# Patient Record
Sex: FEMALE | Race: WHITE | NOT HISPANIC OR LATINO | Employment: OTHER | URBAN - METROPOLITAN AREA
[De-identification: names, ages, dates, MRNs, and addresses within clinical notes are randomized per-mention and may not be internally consistent; named-entity substitution may affect disease eponyms.]

---

## 2017-11-10 ENCOUNTER — TRANSCRIBE ORDERS (OUTPATIENT)
Dept: ADMINISTRATIVE | Facility: HOSPITAL | Age: 52
End: 2017-11-10

## 2017-11-10 ENCOUNTER — LAB REQUISITION (OUTPATIENT)
Dept: LAB | Facility: HOSPITAL | Age: 52
End: 2017-11-10

## 2017-11-10 DIAGNOSIS — K59.00 CONSTIPATION, UNSPECIFIED CONSTIPATION TYPE: Primary | ICD-10-CM

## 2017-11-10 DIAGNOSIS — Z00.00 ROUTINE GENERAL MEDICAL EXAMINATION AT A HEALTH CARE FACILITY: ICD-10-CM

## 2017-11-10 LAB
BACTERIA UR QL AUTO: ABNORMAL /HPF
BILIRUB UR QL STRIP: NEGATIVE
CLARITY UR: CLEAR
COLOR UR: YELLOW
GLUCOSE UR STRIP-MCNC: NEGATIVE MG/DL
HGB UR QL STRIP.AUTO: NEGATIVE
HYALINE CASTS UR QL AUTO: ABNORMAL /LPF
KETONES UR QL STRIP: NEGATIVE
LEUKOCYTE ESTERASE UR QL STRIP.AUTO: NEGATIVE
NITRITE UR QL STRIP: NEGATIVE
PH UR STRIP.AUTO: 6 [PH] (ref 5–8)
PROT UR QL STRIP: NEGATIVE
RBC # UR: ABNORMAL /HPF
REF LAB TEST METHOD: ABNORMAL
SP GR UR STRIP: 1.01 (ref 1–1.03)
SQUAMOUS #/AREA URNS HPF: ABNORMAL /HPF
UROBILINOGEN UR QL STRIP: NORMAL
WBC UR QL AUTO: ABNORMAL /HPF

## 2017-11-10 PROCEDURE — 81001 URINALYSIS AUTO W/SCOPE: CPT | Performed by: UROLOGY

## 2017-11-10 PROCEDURE — 87086 URINE CULTURE/COLONY COUNT: CPT | Performed by: UROLOGY

## 2017-11-12 LAB — BACTERIA SPEC AEROBE CULT: NORMAL

## 2017-11-20 ENCOUNTER — APPOINTMENT (OUTPATIENT)
Dept: GENERAL RADIOLOGY | Facility: HOSPITAL | Age: 52
End: 2017-11-20
Attending: COLON & RECTAL SURGERY

## 2017-11-29 ENCOUNTER — APPOINTMENT (OUTPATIENT)
Dept: GENERAL RADIOLOGY | Facility: HOSPITAL | Age: 52
End: 2017-11-29
Attending: COLON & RECTAL SURGERY

## 2017-12-01 ENCOUNTER — HOSPITAL ENCOUNTER (OUTPATIENT)
Dept: GENERAL RADIOLOGY | Facility: HOSPITAL | Age: 52
Discharge: HOME | End: 2017-12-01
Attending: COLON & RECTAL SURGERY | Admitting: COLON & RECTAL SURGERY

## 2017-12-01 DIAGNOSIS — K59.00 CONSTIPATION, UNSPECIFIED CONSTIPATION TYPE: ICD-10-CM

## 2017-12-01 PROCEDURE — 74270 X-RAY XM COLON 1CNTRST STD: CPT

## 2017-12-01 PROCEDURE — 0 DIATRIZOATE MEGLUMINE & SODIUM PER 1 ML: Performed by: COLON & RECTAL SURGERY

## 2017-12-01 RX ADMIN — DIATRIZOATE MEGLUMINE AND DIATRIZOATE SODIUM 480 ML: 660; 100 LIQUID ORAL; RECTAL at 10:20

## 2018-03-15 ENCOUNTER — LAB REQUISITION (OUTPATIENT)
Dept: LAB | Facility: HOSPITAL | Age: 53
End: 2018-03-15

## 2018-03-15 DIAGNOSIS — Z00.00 ROUTINE GENERAL MEDICAL EXAMINATION AT A HEALTH CARE FACILITY: ICD-10-CM

## 2018-03-15 LAB
BILIRUB UR QL STRIP: NEGATIVE
CLARITY UR: CLEAR
COLOR UR: YELLOW
GLUCOSE UR STRIP-MCNC: NEGATIVE MG/DL
HGB UR QL STRIP.AUTO: NEGATIVE
KETONES UR QL STRIP: NEGATIVE
LEUKOCYTE ESTERASE UR QL STRIP.AUTO: ABNORMAL
NITRITE UR QL STRIP: NEGATIVE
PH UR STRIP.AUTO: 6 [PH] (ref 5–8)
PROT UR QL STRIP: NEGATIVE
SP GR UR STRIP: 1.01 (ref 1–1.03)
UROBILINOGEN UR QL STRIP: ABNORMAL

## 2018-03-15 PROCEDURE — 87147 CULTURE TYPE IMMUNOLOGIC: CPT | Performed by: UROLOGY

## 2018-03-15 PROCEDURE — 87086 URINE CULTURE/COLONY COUNT: CPT | Performed by: UROLOGY

## 2018-03-15 PROCEDURE — 36415 COLL VENOUS BLD VENIPUNCTURE: CPT | Performed by: UROLOGY

## 2018-03-15 PROCEDURE — 81003 URINALYSIS AUTO W/O SCOPE: CPT | Performed by: UROLOGY

## 2018-03-17 LAB
BACTERIA SPEC AEROBE CULT: ABNORMAL
BACTERIA SPEC AEROBE CULT: ABNORMAL
STREP GROUPING: ABNORMAL

## 2018-04-20 ENCOUNTER — LAB REQUISITION (OUTPATIENT)
Dept: LAB | Facility: HOSPITAL | Age: 53
End: 2018-04-20

## 2018-04-20 DIAGNOSIS — Z00.00 ROUTINE GENERAL MEDICAL EXAMINATION AT A HEALTH CARE FACILITY: ICD-10-CM

## 2018-04-20 LAB
BACTERIA UR QL AUTO: ABNORMAL /HPF
BILIRUB UR QL STRIP: NEGATIVE
CLARITY UR: CLEAR
COLOR UR: YELLOW
GLUCOSE UR STRIP-MCNC: NEGATIVE MG/DL
HGB UR QL STRIP.AUTO: ABNORMAL
HYALINE CASTS UR QL AUTO: ABNORMAL /LPF
KETONES UR QL STRIP: NEGATIVE
LEUKOCYTE ESTERASE UR QL STRIP.AUTO: ABNORMAL
NITRITE UR QL STRIP: NEGATIVE
PH UR STRIP.AUTO: 6.5 [PH] (ref 5–8)
PROT UR QL STRIP: NEGATIVE
RBC # UR: ABNORMAL /HPF
REF LAB TEST METHOD: ABNORMAL
SP GR UR STRIP: <=1.005 (ref 1–1.03)
SQUAMOUS #/AREA URNS HPF: ABNORMAL /HPF
UROBILINOGEN UR QL STRIP: ABNORMAL
WBC UR QL AUTO: ABNORMAL /HPF

## 2018-04-20 PROCEDURE — 36415 COLL VENOUS BLD VENIPUNCTURE: CPT | Performed by: OBSTETRICS & GYNECOLOGY

## 2018-04-20 PROCEDURE — 87147 CULTURE TYPE IMMUNOLOGIC: CPT | Performed by: OBSTETRICS & GYNECOLOGY

## 2018-04-20 PROCEDURE — 81001 URINALYSIS AUTO W/SCOPE: CPT | Performed by: OBSTETRICS & GYNECOLOGY

## 2018-04-20 PROCEDURE — 87086 URINE CULTURE/COLONY COUNT: CPT | Performed by: OBSTETRICS & GYNECOLOGY

## 2018-04-22 LAB
BACTERIA SPEC AEROBE CULT: ABNORMAL
BACTERIA SPEC AEROBE CULT: ABNORMAL
STREP GROUPING: ABNORMAL

## 2019-08-23 ENCOUNTER — HOSPITAL ENCOUNTER (OUTPATIENT)
Dept: GENERAL RADIOLOGY | Facility: HOSPITAL | Age: 54
Discharge: HOME | End: 2019-08-23
Admitting: NURSE PRACTITIONER

## 2019-08-23 ENCOUNTER — TRANSCRIBE ORDERS (OUTPATIENT)
Dept: ADMINISTRATIVE | Facility: HOSPITAL | Age: 54
End: 2019-08-23

## 2019-08-23 DIAGNOSIS — M25.562 LEFT MEDIAL KNEE PAIN: Primary | ICD-10-CM

## 2019-08-23 PROCEDURE — 73560 X-RAY EXAM OF KNEE 1 OR 2: CPT

## 2019-08-26 ENCOUNTER — TRANSCRIBE ORDERS (OUTPATIENT)
Dept: ADMINISTRATIVE | Facility: HOSPITAL | Age: 54
End: 2019-08-26

## 2019-08-26 DIAGNOSIS — E04.1 THYROID NODULE: Primary | ICD-10-CM

## 2019-09-10 ENCOUNTER — HOSPITAL ENCOUNTER (EMERGENCY)
Facility: HOSPITAL | Age: 54
Discharge: HOME | End: 2019-09-11
Attending: EMERGENCY MEDICINE | Admitting: EMERGENCY MEDICINE

## 2019-09-10 ENCOUNTER — APPOINTMENT (OUTPATIENT)
Dept: CT IMAGING | Facility: HOSPITAL | Age: 54
End: 2019-09-10

## 2019-09-10 VITALS
WEIGHT: 138 LBS | HEART RATE: 68 BPM | SYSTOLIC BLOOD PRESSURE: 130 MMHG | DIASTOLIC BLOOD PRESSURE: 64 MMHG | RESPIRATION RATE: 16 BRPM | BODY MASS INDEX: 21.66 KG/M2 | TEMPERATURE: 98.5 F | HEIGHT: 67 IN | OXYGEN SATURATION: 98 %

## 2019-09-10 DIAGNOSIS — M79.18 MUSCULOSKELETAL PAIN: ICD-10-CM

## 2019-09-10 DIAGNOSIS — R10.9 RIGHT FLANK PAIN: Primary | ICD-10-CM

## 2019-09-10 LAB
ALBUMIN SERPL-MCNC: 4.9 G/DL (ref 3.5–5.2)
ALBUMIN/GLOB SERPL: 2 G/DL
ALP SERPL-CCNC: 55 U/L (ref 39–117)
ALT SERPL W P-5'-P-CCNC: 8 U/L (ref 1–33)
ANION GAP SERPL CALCULATED.3IONS-SCNC: 10 MMOL/L (ref 5–15)
AST SERPL-CCNC: 15 U/L (ref 1–32)
BACTERIA UR QL AUTO: NORMAL /HPF
BASOPHILS # BLD AUTO: 0.02 10*3/MM3 (ref 0–0.2)
BASOPHILS NFR BLD AUTO: 0.4 % (ref 0–1.5)
BILIRUB SERPL-MCNC: 0.3 MG/DL (ref 0.2–1.2)
BILIRUB UR QL STRIP: NEGATIVE
BUN BLD-MCNC: 9 MG/DL (ref 6–20)
BUN/CREAT SERPL: 8.8 (ref 7–25)
CALCIUM SPEC-SCNC: 9.6 MG/DL (ref 8.6–10.5)
CHLORIDE SERPL-SCNC: 104 MMOL/L (ref 98–107)
CLARITY UR: CLEAR
CO2 SERPL-SCNC: 27 MMOL/L (ref 22–29)
COLOR UR: YELLOW
CREAT BLD-MCNC: 1.02 MG/DL (ref 0.57–1)
DEPRECATED RDW RBC AUTO: 45.4 FL (ref 37–54)
EOSINOPHIL # BLD AUTO: 0.05 10*3/MM3 (ref 0–0.4)
EOSINOPHIL NFR BLD AUTO: 1 % (ref 0.3–6.2)
ERYTHROCYTE [DISTWIDTH] IN BLOOD BY AUTOMATED COUNT: 13 % (ref 12.3–15.4)
GFR SERPL CREATININE-BSD FRML MDRD: 57 ML/MIN/1.73
GLOBULIN UR ELPH-MCNC: 2.5 GM/DL
GLUCOSE BLD-MCNC: 94 MG/DL (ref 65–99)
GLUCOSE UR STRIP-MCNC: NEGATIVE MG/DL
HCT VFR BLD AUTO: 41 % (ref 34–46.6)
HGB BLD-MCNC: 13.4 G/DL (ref 12–15.9)
HGB UR QL STRIP.AUTO: NEGATIVE
HOLD SPECIMEN: NORMAL
HOLD SPECIMEN: NORMAL
HYALINE CASTS UR QL AUTO: NORMAL /LPF
IMM GRANULOCYTES # BLD AUTO: 0.01 10*3/MM3 (ref 0–0.05)
IMM GRANULOCYTES NFR BLD AUTO: 0.2 % (ref 0–0.5)
KETONES UR QL STRIP: NEGATIVE
LEUKOCYTE ESTERASE UR QL STRIP.AUTO: ABNORMAL
LIPASE SERPL-CCNC: 40 U/L (ref 13–60)
LYMPHOCYTES # BLD AUTO: 1.96 10*3/MM3 (ref 0.7–3.1)
LYMPHOCYTES NFR BLD AUTO: 38.1 % (ref 19.6–45.3)
MCH RBC QN AUTO: 31.1 PG (ref 26.6–33)
MCHC RBC AUTO-ENTMCNC: 32.7 G/DL (ref 31.5–35.7)
MCV RBC AUTO: 95.1 FL (ref 79–97)
MONOCYTES # BLD AUTO: 0.33 10*3/MM3 (ref 0.1–0.9)
MONOCYTES NFR BLD AUTO: 6.4 % (ref 5–12)
NEUTROPHILS # BLD AUTO: 2.78 10*3/MM3 (ref 1.7–7)
NEUTROPHILS NFR BLD AUTO: 53.9 % (ref 42.7–76)
NITRITE UR QL STRIP: NEGATIVE
NRBC BLD AUTO-RTO: 0 /100 WBC (ref 0–0.2)
PH UR STRIP.AUTO: 5.5 [PH] (ref 5–8)
PLATELET # BLD AUTO: 212 10*3/MM3 (ref 140–450)
PMV BLD AUTO: 9.5 FL (ref 6–12)
POTASSIUM BLD-SCNC: 3.9 MMOL/L (ref 3.5–5.2)
PROT SERPL-MCNC: 7.4 G/DL (ref 6–8.5)
PROT UR QL STRIP: NEGATIVE
RBC # BLD AUTO: 4.31 10*6/MM3 (ref 3.77–5.28)
RBC # UR: NORMAL /HPF
REF LAB TEST METHOD: NORMAL
SODIUM BLD-SCNC: 141 MMOL/L (ref 136–145)
SP GR UR STRIP: 1.01 (ref 1–1.03)
SQUAMOUS #/AREA URNS HPF: NORMAL /HPF
UROBILINOGEN UR QL STRIP: ABNORMAL
WBC NRBC COR # BLD: 5.15 10*3/MM3 (ref 3.4–10.8)
WBC UR QL AUTO: NORMAL /HPF
WHOLE BLOOD HOLD SPECIMEN: NORMAL
WHOLE BLOOD HOLD SPECIMEN: NORMAL

## 2019-09-10 PROCEDURE — 87086 URINE CULTURE/COLONY COUNT: CPT | Performed by: EMERGENCY MEDICINE

## 2019-09-10 PROCEDURE — 83690 ASSAY OF LIPASE: CPT | Performed by: EMERGENCY MEDICINE

## 2019-09-10 PROCEDURE — 74176 CT ABD & PELVIS W/O CONTRAST: CPT

## 2019-09-10 PROCEDURE — 99284 EMERGENCY DEPT VISIT MOD MDM: CPT

## 2019-09-10 PROCEDURE — 85025 COMPLETE CBC W/AUTO DIFF WBC: CPT

## 2019-09-10 PROCEDURE — 80053 COMPREHEN METABOLIC PANEL: CPT | Performed by: EMERGENCY MEDICINE

## 2019-09-10 PROCEDURE — 81001 URINALYSIS AUTO W/SCOPE: CPT | Performed by: EMERGENCY MEDICINE

## 2019-09-10 RX ORDER — SODIUM CHLORIDE 0.9 % (FLUSH) 0.9 %
10 SYRINGE (ML) INJECTION AS NEEDED
Status: DISCONTINUED | OUTPATIENT
Start: 2019-09-10 | End: 2019-09-11 | Stop reason: HOSPADM

## 2019-09-10 RX ORDER — LEVOFLOXACIN 500 MG/1
500 TABLET, FILM COATED ORAL DAILY
COMMUNITY
End: 2022-08-04 | Stop reason: ALTCHOICE

## 2019-09-11 RX ORDER — CYCLOBENZAPRINE HCL 10 MG
10 TABLET ORAL 3 TIMES DAILY
Qty: 20 TABLET | Refills: 0 | Status: SHIPPED | OUTPATIENT
Start: 2019-09-11 | End: 2022-08-04 | Stop reason: ALTCHOICE

## 2019-09-11 NOTE — DISCHARGE INSTRUCTIONS
Complete your course of Levaquin.    Take muscle relaxers as directed.    Follow up with your primary care provider in 1 week to monitor your recovery.    Return if you experience any worsening symptoms or concerns.

## 2019-09-11 NOTE — ED PROVIDER NOTES
Subjective   Eleanor Robert is a 54 y.o. female presenting to the emergency department complaining of dysuria and right flank pain that onset approximately 6 days ago. Her symptoms prompted presentation to her primary care provider 4 days ago, wherein she was diagnosed with a kidney infection and subsequently placed on Levaquin. She notes that she had a physical on 8/23/19 that revealed positive nitrates in her urine. She was placed on Macrobid for 7 days. At that point, she noticed only mild dysuria. She had not experienced any more trouble prior to 6 days ago. She notes some associated nausea and headache, but denies any fever. She believes that her symptoms may also be a result of her Levaquin course. She notes that her flank pain radiates to her right lower back. Her pain is not exacerbated by deep breathing, but it is reproduced when she sits upright from a lying position. She denies any recent heavy lifting. She also reports that Tylenol has offered no relief of her pain. Of note, she reports a significant history of UTIs as a result of klebsiella pneumoniae. 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 She also has had her gallbladder and appendix removed. She also notes a history of rheumatoid arthritis and degenerative disc disease. There are no other acute complaints at this time.        History provided by:  Patient  Flank Pain   Pain location:  R flank  Pain radiates to:  Back  Pain severity:  Moderate  Onset quality:  Sudden  Duration:  6 days  Timing:  Constant  Progression:  Unchanged  Chronicity:  New  Context comment:  Placed on Levaquin 4 days ago for a suspected kidney infection, has not yet experienced any relief  Relieved by:  Nothing  Worsened by:  Position changes  Ineffective treatments:  Acetaminophen  Associated symptoms: dysuria and nausea    Associated symptoms: no fever        Review of Systems   Constitutional: Negative for fever.    Gastrointestinal: Positive for nausea.   Genitourinary: Positive for dysuria and flank pain.   Neurological: Negative for headaches.   All other systems reviewed and are negative.      Past Medical History:   Diagnosis Date   • History of nephrectomy        Allergies   Allergen Reactions   • Dilaudid [Hydromorphone Hcl] Anxiety   • Morphine Itching and Anxiety   • Sulfa Antibiotics Rash       Past Surgical History:   Procedure Laterality Date   • NEPHRECTOMY Left        History reviewed. No pertinent family history.    Social History     Socioeconomic History   • Marital status:      Spouse name: Not on file   • Number of children: Not on file   • Years of education: Not on file   • Highest education level: Not on file   Tobacco Use   • Smoking status: Never Smoker   • Smokeless tobacco: Never Used   Substance and Sexual Activity   • Alcohol use: No     Frequency: Never         Objective   Physical Exam   Constitutional: She is oriented to person, place, and time. She appears well-developed and well-nourished. No distress.   HENT:   Head: Normocephalic and atraumatic.   Eyes: Conjunctivae are normal. No scleral icterus.   Neck: Normal range of motion. Neck supple.   Cardiovascular: Normal rate, regular rhythm and normal heart sounds.   No murmur heard.  Pulmonary/Chest: Effort normal and breath sounds normal. No respiratory distress.   Abdominal: Soft. There is tenderness.   Mild right upper and lower quadrant tenderness. No CVA tenderness.   Musculoskeletal: Normal range of motion. She exhibits no edema.   Neurological: She is alert and oriented to person, place, and time.   Skin: Skin is warm and dry.   Psychiatric: She has a normal mood and affect. Her behavior is normal.   Nursing note and vitals reviewed.      Procedures         ED Course     Recent Results (from the past 24 hour(s))   Comprehensive Metabolic Panel    Collection Time: 09/10/19  8:49 PM   Result Value Ref Range    Glucose 94 65 - 99  mg/dL    BUN 9 6 - 20 mg/dL    Creatinine 1.02 (H) 0.57 - 1.00 mg/dL    Sodium 141 136 - 145 mmol/L    Potassium 3.9 3.5 - 5.2 mmol/L    Chloride 104 98 - 107 mmol/L    CO2 27.0 22.0 - 29.0 mmol/L    Calcium 9.6 8.6 - 10.5 mg/dL    Total Protein 7.4 6.0 - 8.5 g/dL    Albumin 4.90 3.50 - 5.20 g/dL    ALT (SGPT) 8 1 - 33 U/L    AST (SGOT) 15 1 - 32 U/L    Alkaline Phosphatase 55 39 - 117 U/L    Total Bilirubin 0.3 0.2 - 1.2 mg/dL    eGFR Non African Amer 57 (L) >60 mL/min/1.73    Globulin 2.5 gm/dL    A/G Ratio 2.0 g/dL    BUN/Creatinine Ratio 8.8 7.0 - 25.0    Anion Gap 10.0 5.0 - 15.0 mmol/L   Lipase    Collection Time: 09/10/19  8:49 PM   Result Value Ref Range    Lipase 40 13 - 60 U/L   Light Blue Top    Collection Time: 09/10/19  8:49 PM   Result Value Ref Range    Extra Tube hold for add-on    Green Top (Gel)    Collection Time: 09/10/19  8:49 PM   Result Value Ref Range    Extra Tube Hold for add-ons.    Lavender Top    Collection Time: 09/10/19  8:49 PM   Result Value Ref Range    Extra Tube hold for add-on    Gold Top - SST    Collection Time: 09/10/19  8:49 PM   Result Value Ref Range    Extra Tube Hold for add-ons.    CBC Auto Differential    Collection Time: 09/10/19  8:49 PM   Result Value Ref Range    WBC 5.15 3.40 - 10.80 10*3/mm3    RBC 4.31 3.77 - 5.28 10*6/mm3    Hemoglobin 13.4 12.0 - 15.9 g/dL    Hematocrit 41.0 34.0 - 46.6 %    MCV 95.1 79.0 - 97.0 fL    MCH 31.1 26.6 - 33.0 pg    MCHC 32.7 31.5 - 35.7 g/dL    RDW 13.0 12.3 - 15.4 %    RDW-SD 45.4 37.0 - 54.0 fl    MPV 9.5 6.0 - 12.0 fL    Platelets 212 140 - 450 10*3/mm3    Neutrophil % 53.9 42.7 - 76.0 %    Lymphocyte % 38.1 19.6 - 45.3 %    Monocyte % 6.4 5.0 - 12.0 %    Eosinophil % 1.0 0.3 - 6.2 %    Basophil % 0.4 0.0 - 1.5 %    Immature Grans % 0.2 0.0 - 0.5 %    Neutrophils, Absolute 2.78 1.70 - 7.00 10*3/mm3    Lymphocytes, Absolute 1.96 0.70 - 3.10 10*3/mm3    Monocytes, Absolute 0.33 0.10 - 0.90 10*3/mm3    Eosinophils, Absolute 0.05  0.00 - 0.40 10*3/mm3    Basophils, Absolute 0.02 0.00 - 0.20 10*3/mm3    Immature Grans, Absolute 0.01 0.00 - 0.05 10*3/mm3    nRBC 0.0 0.0 - 0.2 /100 WBC   Urinalysis With Microscopic If Indicated (No Culture) - Urine, Clean Catch    Collection Time: 09/10/19  9:06 PM   Result Value Ref Range    Color, UA Yellow Yellow, Straw    Appearance, UA Clear Clear    pH, UA 5.5 5.0 - 8.0    Specific Gravity, UA 1.006 1.001 - 1.030    Glucose, UA Negative Negative    Ketones, UA Negative Negative    Bilirubin, UA Negative Negative    Blood, UA Negative Negative    Protein, UA Negative Negative    Leuk Esterase, UA Small (1+) (A) Negative    Nitrite, UA Negative Negative    Urobilinogen, UA 0.2 E.U./dL 0.2 - 1.0 E.U./dL   Urinalysis, Microscopic Only - Urine, Clean Catch    Collection Time: 09/10/19  9:06 PM   Result Value Ref Range    RBC, UA 0-2 None Seen, 0-2 /HPF    WBC, UA None Seen None Seen, 0-2 /HPF    Bacteria, UA None Seen None Seen, Trace /HPF    Squamous Epithelial Cells, UA 0-2 None Seen, 0-2 /HPF    Hyaline Casts, UA None Seen 0 - 6 /LPF    Methodology Automated Microscopy      Note: In addition to lab results from this visit, the labs listed above may include labs taken at another facility or during a different encounter within the last 24 hours. Please correlate lab times with ED admission and discharge times for further clarification of the services performed during this visit.    CT Abdomen Pelvis Without Contrast   Final Result   Status post left nephrectomy. There is no right foraminal lithiasis or ureterolithiasis, though there is mild right renal pyelocaliectasis. The right ureter is normal in caliber.      Otherwise normal. No acute abnormality.         Signer Name: Moise Hoffman MD    Signed: 9/10/2019 11:12 PM    Workstation Name: [id number]     [facility]        Vitals:    09/10/19 1953 09/10/19 2304   BP: 174/87 130/64   BP Location: Left arm    Patient Position:  "Sitting    Pulse: 63 68   Resp: 15 16   Temp: 98.5 °F (36.9 °C)    TempSrc: Oral    SpO2: 100% 98%   Weight: 62.6 kg (138 lb)    Height: 170.2 cm (67\")      Medications   sodium chloride 0.9 % flush 10 mL (not administered)     ECG/EMG Results (last 24 hours)     ** No results found for the last 24 hours. **        No orders to display                       MDM    Final diagnoses:   Right flank pain   Musculoskeletal pain       Documentation assistance provided by vinnie Lozano.  Information recorded by the scribe was done at my direction and has been verified and validated by me.     Dyan Lozano  09/11/19 0012       Dyan Lozano  09/11/19 0019       Janet Liang APRN  09/11/19 0021    "

## 2019-09-12 LAB — BACTERIA SPEC AEROBE CULT: NO GROWTH

## 2019-09-16 ENCOUNTER — APPOINTMENT (OUTPATIENT)
Dept: ULTRASOUND IMAGING | Facility: HOSPITAL | Age: 54
End: 2019-09-16

## 2019-10-10 ENCOUNTER — TRANSCRIBE ORDERS (OUTPATIENT)
Dept: ADMINISTRATIVE | Facility: HOSPITAL | Age: 54
End: 2019-10-10

## 2019-10-10 ENCOUNTER — HOSPITAL ENCOUNTER (OUTPATIENT)
Dept: GENERAL RADIOLOGY | Facility: HOSPITAL | Age: 54
Discharge: HOME | End: 2019-10-10
Admitting: FAMILY MEDICINE

## 2019-10-10 DIAGNOSIS — M50.90 DISC DISORDER OF CERVICAL REGION: ICD-10-CM

## 2019-10-10 DIAGNOSIS — M51.36 DEGENERATION OF LUMBAR INTERVERTEBRAL DISC: ICD-10-CM

## 2019-10-10 DIAGNOSIS — M51.36 DEGENERATION OF LUMBAR INTERVERTEBRAL DISC: Primary | ICD-10-CM

## 2019-10-10 PROCEDURE — 72110 X-RAY EXAM L-2 SPINE 4/>VWS: CPT

## 2019-10-10 PROCEDURE — 72050 X-RAY EXAM NECK SPINE 4/5VWS: CPT

## 2019-11-05 ENCOUNTER — TRANSCRIBE ORDERS (OUTPATIENT)
Dept: ADMINISTRATIVE | Facility: HOSPITAL | Age: 54
End: 2019-11-05

## 2019-11-05 DIAGNOSIS — E04.1 THYROID NODULE: Primary | ICD-10-CM

## 2019-11-13 ENCOUNTER — HOSPITAL ENCOUNTER (OUTPATIENT)
Dept: ULTRASOUND IMAGING | Facility: HOSPITAL | Age: 54
Discharge: HOME | End: 2019-11-13
Admitting: FAMILY MEDICINE

## 2019-11-13 DIAGNOSIS — E04.1 THYROID NODULE: ICD-10-CM

## 2019-11-13 PROCEDURE — 76536 US EXAM OF HEAD AND NECK: CPT

## 2021-05-06 ENCOUNTER — TRANSCRIBE ORDERS (OUTPATIENT)
Dept: ADMINISTRATIVE | Facility: HOSPITAL | Age: 56
End: 2021-05-06

## 2021-05-06 DIAGNOSIS — E04.1 THYROID NODULE: Primary | ICD-10-CM

## 2021-05-28 ENCOUNTER — APPOINTMENT (OUTPATIENT)
Dept: ULTRASOUND IMAGING | Facility: HOSPITAL | Age: 56
End: 2021-05-28

## 2021-06-24 ENCOUNTER — HOSPITAL ENCOUNTER (OUTPATIENT)
Dept: ULTRASOUND IMAGING | Facility: HOSPITAL | Age: 56
Discharge: HOME | End: 2021-06-24
Admitting: FAMILY MEDICINE

## 2021-06-24 DIAGNOSIS — E04.1 THYROID NODULE: ICD-10-CM

## 2021-06-24 PROCEDURE — 76536 US EXAM OF HEAD AND NECK: CPT

## 2022-02-23 ENCOUNTER — TRANSCRIBE ORDERS (OUTPATIENT)
Dept: LAB | Facility: HOSPITAL | Age: 57
End: 2022-02-23

## 2022-02-23 ENCOUNTER — HOSPITAL ENCOUNTER (OUTPATIENT)
Dept: GENERAL RADIOLOGY | Facility: HOSPITAL | Age: 57
Discharge: HOME | End: 2022-02-23

## 2022-02-23 ENCOUNTER — LAB (OUTPATIENT)
Dept: LAB | Facility: HOSPITAL | Age: 57
End: 2022-02-23

## 2022-02-23 DIAGNOSIS — R31.1 BENIGN ESSENTIAL MICROSCOPIC HEMATURIA: ICD-10-CM

## 2022-02-23 DIAGNOSIS — R31.1 BENIGN ESSENTIAL MICROSCOPIC HEMATURIA: Primary | ICD-10-CM

## 2022-02-23 LAB
BACTERIA UR QL AUTO: ABNORMAL /HPF
BILIRUB UR QL STRIP: NEGATIVE
CLARITY UR: CLEAR
COLOR UR: YELLOW
GLUCOSE UR STRIP-MCNC: NEGATIVE MG/DL
HGB UR QL STRIP.AUTO: NEGATIVE
HYALINE CASTS UR QL AUTO: ABNORMAL /LPF
KETONES UR QL STRIP: NEGATIVE
LEUKOCYTE ESTERASE UR QL STRIP.AUTO: ABNORMAL
NITRITE UR QL STRIP: NEGATIVE
PH UR STRIP.AUTO: 6.5 [PH] (ref 5–8)
PROT UR QL STRIP: NEGATIVE
RBC # UR STRIP: ABNORMAL /HPF
REF LAB TEST METHOD: ABNORMAL
SP GR UR STRIP: 1.01 (ref 1–1.03)
SQUAMOUS #/AREA URNS HPF: ABNORMAL /HPF
UROBILINOGEN UR QL STRIP: ABNORMAL
WBC # UR STRIP: ABNORMAL /HPF

## 2022-02-23 PROCEDURE — 87088 URINE BACTERIA CULTURE: CPT

## 2022-02-23 PROCEDURE — 87086 URINE CULTURE/COLONY COUNT: CPT

## 2022-02-23 PROCEDURE — 87186 SC STD MICRODIL/AGAR DIL: CPT

## 2022-02-23 PROCEDURE — 81001 URINALYSIS AUTO W/SCOPE: CPT

## 2022-02-23 PROCEDURE — 74018 RADEX ABDOMEN 1 VIEW: CPT

## 2022-02-25 LAB — BACTERIA SPEC AEROBE CULT: ABNORMAL

## 2022-07-18 ENCOUNTER — TRANSCRIBE ORDERS (OUTPATIENT)
Dept: ADMINISTRATIVE | Facility: HOSPITAL | Age: 57
End: 2022-07-18

## 2022-07-18 DIAGNOSIS — E04.1 THYROID NODULE: Primary | ICD-10-CM

## 2022-08-04 ENCOUNTER — LAB (OUTPATIENT)
Dept: LAB | Facility: HOSPITAL | Age: 57
End: 2022-08-04

## 2022-08-04 ENCOUNTER — TELEPHONE (OUTPATIENT)
Dept: PSYCHIATRY | Facility: CLINIC | Age: 57
End: 2022-08-04

## 2022-08-04 ENCOUNTER — OFFICE VISIT (OUTPATIENT)
Dept: PSYCHIATRY | Facility: CLINIC | Age: 57
End: 2022-08-04

## 2022-08-04 VITALS
DIASTOLIC BLOOD PRESSURE: 72 MMHG | BODY MASS INDEX: 24.59 KG/M2 | HEIGHT: 66 IN | WEIGHT: 153 LBS | SYSTOLIC BLOOD PRESSURE: 122 MMHG | HEART RATE: 74 BPM

## 2022-08-04 DIAGNOSIS — F43.10 POST TRAUMATIC STRESS DISORDER (PTSD): ICD-10-CM

## 2022-08-04 DIAGNOSIS — F51.05 INSOMNIA DUE TO MENTAL DISORDER: ICD-10-CM

## 2022-08-04 DIAGNOSIS — F41.1 GENERALIZED ANXIETY DISORDER: ICD-10-CM

## 2022-08-04 DIAGNOSIS — F31.9 BIPOLAR DEPRESSION: ICD-10-CM

## 2022-08-04 DIAGNOSIS — Z79.899 MEDICATION MANAGEMENT: ICD-10-CM

## 2022-08-04 DIAGNOSIS — F31.9 BIPOLAR DEPRESSION: Primary | ICD-10-CM

## 2022-08-04 LAB
ALBUMIN SERPL-MCNC: 4.3 G/DL (ref 3.5–5.2)
ALBUMIN/GLOB SERPL: 2 G/DL
ALP SERPL-CCNC: 48 U/L (ref 39–117)
ALT SERPL W P-5'-P-CCNC: 15 U/L (ref 1–33)
ANION GAP SERPL CALCULATED.3IONS-SCNC: 10.1 MMOL/L (ref 5–15)
AST SERPL-CCNC: 20 U/L (ref 1–32)
BASOPHILS # BLD AUTO: 0.04 10*3/MM3 (ref 0–0.2)
BASOPHILS NFR BLD AUTO: 0.9 % (ref 0–1.5)
BILIRUB SERPL-MCNC: 0.2 MG/DL (ref 0–1.2)
BUN SERPL-MCNC: 16 MG/DL (ref 6–20)
BUN/CREAT SERPL: 16.3 (ref 7–25)
CALCIUM SPEC-SCNC: 9.1 MG/DL (ref 8.6–10.5)
CHLORIDE SERPL-SCNC: 107 MMOL/L (ref 98–107)
CHOLEST SERPL-MCNC: 164 MG/DL (ref 0–200)
CO2 SERPL-SCNC: 23.9 MMOL/L (ref 22–29)
CREAT SERPL-MCNC: 0.98 MG/DL (ref 0.57–1)
DEPRECATED RDW RBC AUTO: 42.1 FL (ref 37–54)
EGFRCR SERPLBLD CKD-EPI 2021: 67.9 ML/MIN/1.73
EOSINOPHIL # BLD AUTO: 0.12 10*3/MM3 (ref 0–0.4)
EOSINOPHIL NFR BLD AUTO: 2.8 % (ref 0.3–6.2)
ERYTHROCYTE [DISTWIDTH] IN BLOOD BY AUTOMATED COUNT: 12.4 % (ref 12.3–15.4)
GLOBULIN UR ELPH-MCNC: 2.2 GM/DL
GLUCOSE SERPL-MCNC: 93 MG/DL (ref 65–99)
HBA1C MFR BLD: 5.1 % (ref 4.8–5.6)
HCT VFR BLD AUTO: 40.5 % (ref 34–46.6)
HDLC SERPL-MCNC: 69 MG/DL (ref 40–60)
HGB BLD-MCNC: 13.9 G/DL (ref 12–15.9)
IMM GRANULOCYTES # BLD AUTO: 0.01 10*3/MM3 (ref 0–0.05)
IMM GRANULOCYTES NFR BLD AUTO: 0.2 % (ref 0–0.5)
LDLC SERPL CALC-MCNC: 86 MG/DL (ref 0–100)
LDLC/HDLC SERPL: 1.26 {RATIO}
LYMPHOCYTES # BLD AUTO: 1.42 10*3/MM3 (ref 0.7–3.1)
LYMPHOCYTES NFR BLD AUTO: 32.6 % (ref 19.6–45.3)
MCH RBC QN AUTO: 32.4 PG (ref 26.6–33)
MCHC RBC AUTO-ENTMCNC: 34.3 G/DL (ref 31.5–35.7)
MCV RBC AUTO: 94.4 FL (ref 79–97)
MONOCYTES # BLD AUTO: 0.3 10*3/MM3 (ref 0.1–0.9)
MONOCYTES NFR BLD AUTO: 6.9 % (ref 5–12)
NEUTROPHILS NFR BLD AUTO: 2.47 10*3/MM3 (ref 1.7–7)
NEUTROPHILS NFR BLD AUTO: 56.6 % (ref 42.7–76)
NRBC BLD AUTO-RTO: 0 /100 WBC (ref 0–0.2)
PLATELET # BLD AUTO: 232 10*3/MM3 (ref 140–450)
PMV BLD AUTO: 10 FL (ref 6–12)
POTASSIUM SERPL-SCNC: 5.1 MMOL/L (ref 3.5–5.2)
PROT SERPL-MCNC: 6.5 G/DL (ref 6–8.5)
RBC # BLD AUTO: 4.29 10*6/MM3 (ref 3.77–5.28)
SODIUM SERPL-SCNC: 141 MMOL/L (ref 136–145)
T4 FREE SERPL-MCNC: 0.9 NG/DL (ref 0.93–1.7)
TRIGL SERPL-MCNC: 40 MG/DL (ref 0–150)
TSH SERPL DL<=0.05 MIU/L-ACNC: 1.2 UIU/ML (ref 0.27–4.2)
VIT B12 BLD-MCNC: 978 PG/ML (ref 211–946)
VLDLC SERPL-MCNC: 9 MG/DL (ref 5–40)
WBC NRBC COR # BLD: 4.36 10*3/MM3 (ref 3.4–10.8)

## 2022-08-04 PROCEDURE — 83036 HEMOGLOBIN GLYCOSYLATED A1C: CPT

## 2022-08-04 PROCEDURE — 80307 DRUG TEST PRSMV CHEM ANLYZR: CPT | Performed by: NURSE PRACTITIONER

## 2022-08-04 PROCEDURE — 84443 ASSAY THYROID STIM HORMONE: CPT

## 2022-08-04 PROCEDURE — 80053 COMPREHEN METABOLIC PANEL: CPT

## 2022-08-04 PROCEDURE — 84439 ASSAY OF FREE THYROXINE: CPT

## 2022-08-04 PROCEDURE — 36415 COLL VENOUS BLD VENIPUNCTURE: CPT | Performed by: NURSE PRACTITIONER

## 2022-08-04 PROCEDURE — 82607 VITAMIN B-12: CPT

## 2022-08-04 PROCEDURE — 85025 COMPLETE CBC W/AUTO DIFF WBC: CPT

## 2022-08-04 PROCEDURE — 82652 VIT D 1 25-DIHYDROXY: CPT | Performed by: NURSE PRACTITIONER

## 2022-08-04 PROCEDURE — 80061 LIPID PANEL: CPT

## 2022-08-04 PROCEDURE — 90792 PSYCH DIAG EVAL W/MED SRVCS: CPT | Performed by: NURSE PRACTITIONER

## 2022-08-04 PROCEDURE — G0483 DRUG TEST DEF 22+ CLASSES: HCPCS | Performed by: NURSE PRACTITIONER

## 2022-08-04 RX ORDER — DOXEPIN HYDROCHLORIDE 10 MG/1
CAPSULE ORAL
COMMUNITY
Start: 2022-07-15 | End: 2022-08-17

## 2022-08-04 RX ORDER — HYDROXYZINE HYDROCHLORIDE 25 MG/1
25-50 TABLET, FILM COATED ORAL NIGHTLY PRN
Qty: 60 TABLET | Refills: 1 | Status: SHIPPED | OUTPATIENT
Start: 2022-08-04 | End: 2022-10-03

## 2022-08-04 RX ORDER — MULTIPLE VITAMINS W/ MINERALS TAB 9MG-400MCG
TAB ORAL
COMMUNITY

## 2022-08-04 RX ORDER — TIZANIDINE 4 MG/1
TABLET ORAL
COMMUNITY
Start: 2022-05-12 | End: 2022-10-06 | Stop reason: SDDI

## 2022-08-04 RX ORDER — ESTRADIOL 2 MG/1
TABLET ORAL
COMMUNITY
Start: 2022-06-11

## 2022-08-04 RX ORDER — TOPIRAMATE 100 MG/1
TABLET, FILM COATED ORAL
COMMUNITY
End: 2022-10-26 | Stop reason: SDUPTHER

## 2022-08-04 RX ORDER — CITALOPRAM 20 MG/1
TABLET ORAL
COMMUNITY
Start: 2022-06-05 | End: 2022-10-26 | Stop reason: SDUPTHER

## 2022-08-04 RX ORDER — LAMOTRIGINE 150 MG/1
TABLET ORAL
COMMUNITY
End: 2022-10-26 | Stop reason: SDUPTHER

## 2022-08-04 RX ORDER — LAMOTRIGINE 25 MG/1
TABLET ORAL
COMMUNITY
End: 2022-10-26 | Stop reason: SDUPTHER

## 2022-08-04 RX ORDER — CITALOPRAM 10 MG/1
TABLET ORAL
COMMUNITY
Start: 2022-07-15 | End: 2022-08-17 | Stop reason: DRUGHIGH
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
## 2022-08-04 NOTE — TELEPHONE ENCOUNTER
Patient called stating she is confused on her medications she states she know's she is to start Vraylar today but is she to Discontinue Doxepin and add Hydroxyzine or to take all 3 medications together? Please Advise

## 2022-08-06 LAB — 1,25(OH)2D SERPL-MCNC: 40.8 PG/ML (ref 24.8–81.5)

## 2022-08-08 DIAGNOSIS — F31.9 BIPOLAR DEPRESSION: ICD-10-CM

## 2022-08-08 LAB — DRUGS UR: NORMAL

## 2022-08-08 NOTE — TELEPHONE ENCOUNTER
Eleanor called stating she is still not sleeping and only getting about 3 hours of sleep at night. States she seems to be doing good on the Vraylar. States she is having heart flutters states she had them before  starting the Vraylar. States it was discussed in the office visit about trying the Vyvanse. Wants to know if you think that would help? Please advise

## 2022-08-09 NOTE — TELEPHONE ENCOUNTER
Spoke to Eleanor she states she will make na Appt with her PCP for an ekg also wants to know if you have reviewed her labs?

## 2022-08-09 NOTE — TELEPHONE ENCOUNTER
If she is having Cardiac issues, she should go to ER or PCP to be evaluated. Has she had EKG?  Will need to investigate cardiac issues prior to starting stimulant. We can discuss other possible options. Vraylar can be increased if she has noticed a benefit.

## 2022-08-09 NOTE — TELEPHONE ENCOUNTER
Yes, her Free T4 was low at 0.90 (normal range 0.93-1.70), TSH level was WNL. These can be redrawn in a few weeks and continue to monitor. She should make sure that she keeps all follow up appointments to monitor thyroid goiter.

## 2022-08-12 LAB — DRUGS UR: NORMAL

## 2022-08-15 NOTE — TELEPHONE ENCOUNTER
Rx Refill Note  Requested Prescriptions     Pending Prescriptions Disp Refills   • Cariprazine HCl (Vraylar) 1.5 MG capsule capsule 30 capsule 0     Sig: Take 1 capsule by mouth Daily for 30 days.      Last office visit with prescribing clinician: 8/4/2022      Next office visit with prescribing clinician: 8/18/2022            Gill Woods CMA  08/15/22, 13:20 EDTPt called in states she had left message with Christen that she needed a refill of Vraylar sent in before appointment and that she needs a prior auth.

## 2022-08-17 ENCOUNTER — TELEMEDICINE (OUTPATIENT)
Dept: PSYCHIATRY | Facility: CLINIC | Age: 57
End: 2022-08-17

## 2022-08-17 DIAGNOSIS — F41.1 GENERALIZED ANXIETY DISORDER: ICD-10-CM

## 2022-08-17 DIAGNOSIS — F43.10 POST TRAUMATIC STRESS DISORDER (PTSD): ICD-10-CM

## 2022-08-17 DIAGNOSIS — F31.9 BIPOLAR DEPRESSION: ICD-10-CM

## 2022-08-17 DIAGNOSIS — F51.05 INSOMNIA DUE TO MENTAL DISORDER: Primary | ICD-10-CM

## 2022-08-17 PROCEDURE — 99214 OFFICE O/P EST MOD 30 MIN: CPT | Performed by: NURSE PRACTITIONER

## 2022-08-17 RX ORDER — ZOLPIDEM TARTRATE 5 MG/1
5 TABLET ORAL NIGHTLY PRN
Qty: 14 TABLET | Refills: 0 | Status: SHIPPED | OUTPATIENT
Start: 2022-08-17 | End: 2022-09-16

## 2022-08-17 NOTE — PROGRESS NOTES
"This provider is located at [facility], [address] [place] [address], [facility], [id number] using a secure [employer] Video Visit through [employer]. Patient is being seen remotely via telehealth at their home address in [place], and stated they are in a secure environment for this session. The patient's condition being diagnosed/treated is appropriate for telemedicine. The provider identified herself as well as her credentials.   The patient, and/or patients guardian, consent to be seen remotely, and when consent is given they understand that the consent allows for patient identifiable information to be sent to a third party as needed.   They may refuse to be seen remotely at any time. The electronic data is encrypted and password protected, and the patient and/or guardian has been advised of the potential risks to privacy not withstanding such measures.     You have chosen to receive care through a telehealth visit.  Do you consent to use a video/audio connection for your medical care today? Yes    Subjective   Eleanor Robert is a 57 y.o. female who presents today for follow up    Chief Complaint:  Insomnia, anxiety     History of Present Illness:   Eleanor Robert presents for medication management follow up via MyChart video visit.  Reports increasing insomnia. Verbalizes that she is obtaining only about 2 to 3 hours total sleep during the night.  Has trouble falling asleep due to racing thoughts and wakes up frequently during the night, finding it difficult to go back to sleep.  Reports that mood has improved significantly with initiation of Vraylar.  Feels that she has less frequent shifts in mood with medication. Verbalizes that she feels sleep would help her mood tremendously. Was on Temazepam for extended time and says that she had a \"horrible time\" when medication was stopped. Says that she tried Ambien in past that worked well without adverse effects. Denies any current SI/HI or A/V hallucinations.     History: Dx " with Bipolar 2 disorder, ADHD and anxiety 10 years ago at [facility]. Was seeing provider Porfirio ARENAS then later Milton ARENAS at [facility] in [place]. One hospitalization in  at [facility] in [place] for SI.   Previous Psych Meds: Several SSRI's, SNRI's, Seroquel (weight gain), Abilify (increased irritability and akathisia), Geodon (increased anxiety), Zyprexa (edema), Lithium (weight gain and hidradenitis), Temazepam  and Lorazepam     The following portions of the patient's history were reviewed and updated as appropriate: allergies, current medications, past family history, past medical history, past social history, past surgical history and problem list.      Past Medical History:  Past Medical History:   Diagnosis Date   • Alcohol abuse    • Anxiety    • Bipolar disorder (HCC)    • History of nephrectomy    • PTSD (post-traumatic stress disorder)        Social History:  Social History     Socioeconomic History   • Marital status:    Tobacco Use   • Smoking status: Former Smoker     Quit date: 2014     Years since quittin.4   • Smokeless tobacco: Never Used   Vaping Use   • Vaping Use: Never used   Substance and Sexual Activity   • Alcohol use: Not Currently     Comment: Recovering   • Drug use: Never   • Sexual activity: Defer       Family History:  Family History   Problem Relation Age of Onset   • Alcohol abuse Father    • Drug abuse Sister        Past Surgical History:  Past Surgical History:   Procedure Laterality Date   • NEPHRECTOMY Left        Problem List:  There is no problem list on file for this patient.      Allergy:   Allergies   Allergen Reactions   • Dilaudid [Hydromorphone Hcl] Anxiety   • Morphine Itching and Anxiety   • Sulfa Antibiotics Rash        Current Medications:   Current Outpatient Medications   Medication Sig Dispense Refill   • Cariprazine HCl (Vraylar) 3 MG capsule capsule Take 1 capsule by mouth Daily for 60 days. 30 capsule 1    • citalopram (CeleXA) 20 MG tablet      • estradiol (ESTRACE) 2 MG tablet      • hydrOXYzine (ATARAX) 25 MG tablet Take 1-2 tablets by mouth At Night As Needed for Anxiety for up to 60 days. 60 tablet 1   • lamoTRIgine (LaMICtal) 150 MG tablet      • lamoTRIgine (LaMICtal) 25 MG tablet      • multivitamin with minerals tablet tablet      • tiZANidine (ZANAFLEX) 4 MG tablet      • topiramate (TOPAMAX) 100 MG tablet      • zolpidem (AMBIEN) 5 MG tablet Take 1 tablet by mouth At Night As Needed for Sleep for up to 30 days. 14 tablet 0     No current facility-administered medications for this visit.       Review of Symptoms:    Review of Systems   Constitutional: Negative for activity change, appetite change and fatigue.   Respiratory: Negative for shortness of breath.    Cardiovascular: Negative for chest pain.   Psychiatric/Behavioral: Positive for agitation, sleep disturbance, depressed mood and stress. Negative for hallucinations and suicidal ideas. The patient is nervous/anxious.      Physical Exam:   Physical Exam  Constitutional:       General: She is not in acute distress.     Appearance: Normal appearance.   Neurological:      Mental Status: She is alert.     Vitals:   There were no vitals taken for this visit. There is no height or weight on file to calculate BMI.  Due to extenuating circumstances and possible current health risks associated with the patient being present in a clinical setting (with current health restrictions in place in regards to possible COVID 19 transmission/exposure), the patient was seen remotely today via a MyChart Video Visit through [facility].  Unable to obtain vital signs due to nature of remote visit.    Mental Status Exam:   Hygiene:   appears good  Cooperation:  Cooperative  Eye Contact:  ANA MARIA due to video visit  Psychomotor Behavior:  Restless  Affect:  Appropriate  Mood: anxious  Hopelessness: Denies  Speech:  Rapid  Thought Process:  Goal directed and Linear  Thought Content:   Mood congruent  Suicidal:  None  Homicidal:  None  Hallucinations:  None  Delusion:  None  Memory:  Intact  Orientation:  Person, Place, Time and Situation  Reliability:  fair  Insight:  Fair  Judgement:  Fair  Impulse Control:  Fair    Lab Results:   Orders Only on 08/04/2022   Component Date Value Ref Range Status   • Report Summary 08/04/2022 FINAL   Final    Comment: ====================================================================  [facility] COMP DRUG ANALYSIS,UR  ====================================================================  Specimen Alert  Note:  Urinary creatinine is low; ability to detect some  drugs may be compromised.  Interpret results  with caution.  ====================================================================  Test                             Result       Flag       Units  Drug Present    Topiramate                     PRESENT    Lamotrigine                    PRESENT    Citalopram                     PRESENT    Desmethylcitalopram            PRESENT     Desmethylcitalopram is an expected metabolite of citalopram or     the enantiomeric form, escitalopram.    Doxepin                        PRESENT    Acetaminophen                  PRESENT    Ibuprofen                      PRESENT  ====================================================================  Test                      Result    Flag   Units      Ref Range    Creat                           inine              14        L      mg/dL      >=20  ====================================================================  Declared Medications:   Medication list was not provided.  ====================================================================  For clinical consultation, please call [phone].  ====================================================================     Lab on 08/04/2022   Component Date Value Ref Range Status   • Glucose 08/04/2022 93  65 - 99 mg/dL Final   • BUN 08/04/2022 16  6 - 20 mg/dL Final   • Creatinine  08/04/2022 0.98  0.57 - 1.00 mg/dL Final   • Sodium 08/04/2022 141  136 - 145 mmol/L Final   • Potassium 08/04/2022 5.1  3.5 - 5.2 mmol/L Final   • Chloride 08/04/2022 107  98 - 107 mmol/L Final   • CO2 08/04/2022 23.9  22.0 - 29.0 mmol/L Final   • Calcium 08/04/2022 9.1  8.6 - 10.5 mg/dL Final   • Total Protein 08/04/2022 6.5  6.0 - 8.5 g/dL Final   • Albumin 08/04/2022 4.30  3.50 - 5.20 g/dL Final   • ALT (SGPT) 08/04/2022 15  1 - 33 U/L Final   • AST (SGOT) 08/04/2022 20  1 - 32 U/L Final   • Alkaline Phosphatase 08/04/2022 48  39 - 117 U/L Final   • Total Bilirubin 08/04/2022 0.2  0.0 - 1.2 mg/dL Final   • Globulin 08/04/2022 2.2  gm/dL Final   • A/G Ratio 08/04/2022 2.0  g/dL Final   • BUN/Creatinine Ratio 08/04/2022 16.3  7.0 - 25.0 Final   • Anion Gap 08/04/2022 10.1  5.0 - 15.0 mmol/L Final   • eGFR 08/04/2022 67.9  >60.0 mL/min/1.73 Final    National Kidney Foundation and American Society of Nephrology (ASN) Task Force recommended calculation based on the Chronic Kidney Disease Epidemiology Collaboration (CKD-EPI) equation refit without adjustment for race.   • Hemoglobin A1C 08/04/2022 5.10  4.80 - 5.60 % Final   • Total Cholesterol 08/04/2022 164  0 - 200 mg/dL Final   • Triglycerides 08/04/2022 40  0 - 150 mg/dL Final   • HDL Cholesterol 08/04/2022 69 (A) 40 - 60 mg/dL Final   • LDL Cholesterol  08/04/2022 86  0 - 100 mg/dL Final   • VLDL Cholesterol 08/04/2022 9  5 - 40 mg/dL Final   • LDL/HDL Ratio 08/04/2022 1.26   Final   • Vitamin B-12 08/04/2022 978 (A) 211 - 946 pg/mL Final   • TSH 08/04/2022 1.200  0.270 - 4.200 uIU/mL Final   • Free T4 08/04/2022 0.90 (A) 0.93 - 1.70 ng/dL Final   • WBC 08/04/2022 4.36  3.40 - 10.80 10*3/mm3 Final   • RBC 08/04/2022 4.29  3.77 - 5.28 10*6/mm3 Final   • Hemoglobin 08/04/2022 13.9  12.0 - 15.9 g/dL Final   • Hematocrit 08/04/2022 40.5  34.0 - 46.6 % Final   • MCV 08/04/2022 94.4  79.0 - 97.0 fL Final   • MCH 08/04/2022 32.4  26.6 - 33.0 pg Final   • MCHC  08/04/2022 34.3  31.5 - 35.7 g/dL Final   • RDW 08/04/2022 12.4  12.3 - 15.4 % Final   • RDW-SD 08/04/2022 42.1  37.0 - 54.0 fl Final   • MPV 08/04/2022 10.0  6.0 - 12.0 fL Final   • Platelets 08/04/2022 232  140 - 450 10*3/mm3 Final   • Neutrophil % 08/04/2022 56.6  42.7 - 76.0 % Final   • Lymphocyte % 08/04/2022 32.6  19.6 - 45.3 % Final   • Monocyte % 08/04/2022 6.9  5.0 - 12.0 % Final   • Eosinophil % 08/04/2022 2.8  0.3 - 6.2 % Final   • Basophil % 08/04/2022 0.9  0.0 - 1.5 % Final   • Immature Grans % 08/04/2022 0.2  0.0 - 0.5 % Final   • Neutrophils, Absolute 08/04/2022 2.47  1.70 - 7.00 10*3/mm3 Final   • Lymphocytes, Absolute 08/04/2022 1.42  0.70 - 3.10 10*3/mm3 Final   • Monocytes, Absolute 08/04/2022 0.30  0.10 - 0.90 10*3/mm3 Final   • Eosinophils, Absolute 08/04/2022 0.12  0.00 - 0.40 10*3/mm3 Final   • Basophils, Absolute 08/04/2022 0.04  0.00 - 0.20 10*3/mm3 Final   • Immature Grans, Absolute 08/04/2022 0.01  0.00 - 0.05 10*3/mm3 Final   • nRBC 08/04/2022 0.0  0.0 - 0.2 /100 WBC Final   Office Visit on 08/04/2022   Component Date Value Ref Range Status   • Report Summary 08/04/2022 FINAL   Final    Comment: ====================================================================  [facility] COMP DRUG ANALYSIS,UR  ====================================================================  Test                             Result       Flag       Units  Drug Present    Alpha-hydroxyalprazolam        84                      ng/mg creat     Alpha-hydroxyalprazolam is an expected metabolite of alprazolam.     Source of alprazolam is a scheduled prescription medication.    Topiramate                     PRESENT    Lamotrigine                    PRESENT    Citalopram                     PRESENT    Desmethylcitalopram            PRESENT     Desmethylcitalopram is an expected metabolite of citalopram or     the enantiomeric form, escitalopram.    Doxepin                        PRESENT    Acetaminophen                   PRESENT    Ibuprofen                      PRESENT  ====================================================================  Test                      Result    Flag   Units      Ref Range                               Creatinine              31               mg/dL      >=20  ====================================================================  Declared Medications:   Medication list was not provided.  ====================================================================  For clinical consultation, please call [phone].  ====================================================================   • 1,25-Dihydroxy, Vitamin D 08/04/2022 40.8  24.8 - 81.5 pg/mL Final                  **Please note reference interval change**   Lab on 02/23/2022   Component Date Value Ref Range Status   • Urine Culture 02/23/2022 >100,000 CFU/mL Escherichia coli (A)  Final   • Color, UA 02/23/2022 Yellow  Yellow, Straw Final   • Appearance, UA 02/23/2022 Clear  Clear Final   • pH, UA 02/23/2022 6.5  5.0 - 8.0 Final   • Specific Gravity, UA 02/23/2022 1.006  1.005 - 1.030 Final   • Glucose, UA 02/23/2022 Negative  Negative Final   • Ketones, UA 02/23/2022 Negative  Negative Final   • Bilirubin, UA 02/23/2022 Negative  Negative Final   • Blood, UA 02/23/2022 Negative  Negative Final   • Protein, UA 02/23/2022 Negative  Negative Final   • Leuk Esterase, UA 02/23/2022 Moderate (2+) (A) Negative Final   • Nitrite, UA 02/23/2022 Negative  Negative Final   • Urobilinogen, UA 02/23/2022 0.2 E.U./dL  0.2 - 1.0 E.U./dL Final   • RBC, UA 02/23/2022 None Seen  None Seen, 0-2 /HPF Final   • WBC, UA 02/23/2022 6-12 (A) None Seen, 0-2 /HPF Final   • Bacteria, UA 02/23/2022 Trace (A) None Seen /HPF Final   • Squamous Epithelial Cells, UA 02/23/2022 0-2  None Seen, 0-2 /HPF Final   • Hyaline Casts, UA 02/23/2022 None Seen  None Seen /LPF Final   • Methodology 02/23/2022 Manual Light Microscopy   Final       EKG Results:  No orders to display       Assessment  & Plan   Problems Addressed this Visit    None     Visit Diagnoses     Insomnia due to mental disorder    -  Primary    Relevant Medications    Cariprazine HCl (Vraylar) 3 MG capsule capsule    zolpidem (AMBIEN) 5 MG tablet    Bipolar depression (HCC)        Relevant Medications    Cariprazine HCl (Vraylar) 3 MG capsule capsule    zolpidem (AMBIEN) 5 MG tablet    Generalized anxiety disorder        Relevant Medications    Cariprazine HCl (Vraylar) 3 MG capsule capsule    zolpidem (AMBIEN) 5 MG tablet    Post traumatic stress disorder (PTSD)        Relevant Medications    Cariprazine HCl (Vraylar) 3 MG capsule capsule    zolpidem (AMBIEN) 5 MG tablet      Diagnoses       Codes Comments    Insomnia due to mental disorder    -  Primary ICD-10-CM: F51.05  ICD-9-CM: 300.9, 327.02     Bipolar depression (HCC)     ICD-10-CM: F31.9  ICD-9-CM: 296.50     Generalized anxiety disorder     ICD-10-CM: F41.1  ICD-9-CM: 300.02     Post traumatic stress disorder (PTSD)     ICD-10-CM: F43.10  ICD-9-CM: 309.81           Visit Diagnoses:    ICD-10-CM ICD-9-CM   1. Insomnia due to mental disorder  F51.05 300.9     327.02   2. Bipolar depression (HCC)  F31.9 296.50   3. Generalized anxiety disorder  F41.1 300.02   4. Post traumatic stress disorder (PTSD)  F43.10 309.81       -Reviewed previous available documentation and most recent available labs    -KIRSTY reviewed and is appropriate.    -The benefits of a healthy diet and exercise were discussed with patient, especially the positive effects they have on mental health. Patient encouraged to consider lifestyle modification regarding diet and exercise patterns to maximize results of mental health treatment.     -Discussed importance of counseling to decrease anxiety like symptoms. Discussed coping mechanisms to decrease stress and anxiety: relaxation techniques, guided imagery, music therapy, staying active, support groups, diversional activities and avoid aggravating  factors.  Discussed different coping mechanisms to better control depression.    Encouraged patient to practice good sleep hygiene.  Discussed going to bed at the same time and getting up at the same time every day. Consider a quiet activity, such as reading, part of your nighttime routine. Make your bedroom a dark, comfortable place where it is easy to fall asleep. Avoid or limit caffeine consumption. Limit screen use, especially two hours prior to bed (this includes watching TV, using smartphone, tablet or computer).     Will continue Citalopram and Lamotrigine as previously prescribed and increase Vraylar as she has noticed improvement in overall mood. Will provide Ambien short term to help with sleep.   -Continue Citalopram 30 mg daily for anxiety and depression  -Continue Lamotrigine 350 mg daily for mood stabilization   -Start Vraylar 1.5 mg capsule daily for mood stabilization  -Start Ambien 5 mg as needed at night for sleep    GOALS:  Short Term Goals: Patient will be compliant with medication, and patient will have no significant medication related side effects.  Patient will be engaged in psychotherapy as indicated.  Patient will report subjective improvement of symptoms.  Long term goals: To stabilize mood and treat/improve subjective symptoms, the patient will stay out of the hospital, the patient will be at an optimal level of functioning, and the patient will take all medications as prescribed.  The patient/guardian verbalized understanding and agreement with goals that were mutually set.    TREATMENT PLAN: Continue supportive psychotherapy efforts and medications as indicated for patient's diagnosis.  Pharmacological and Non-Pharmacological treatment options discussed during today's visit. Patient/Guardian acknowledged and verbally consented with current treatment plan and was educated on the importance of compliance with treatment and follow-up appointments.      MEDICATION ISSUES:  Discussed  medication options and treatment plan of prescribed medication as well as the risks, benefits, any black box warnings, and side effects including potential falls, possible impaired driving, and metabolic adversities among others. Patient is agreeable to call the office with any worsening of symptoms or onset of side effects, or if any concerns or questions arise.  The contact information for the office is made available to the patient. Patient is agreeable to call 911 or go to the nearest ER should they begin having any SI/HI, or if any urgent concerns arise. No medication side effects or related complaints today.     MEDS ORDERED DURING VISIT:  New Medications Ordered This Visit   Medications   • Cariprazine HCl (Vraylar) 3 MG capsule capsule     Sig: Take 1 capsule by mouth Daily for 60 days.     Dispense:  30 capsule     Refill:  1     Order Specific Question:   Lot Number?     Answer:   [id number]     Order Specific Question:   Expiration Date?     Answer:   4/24/2024     Order Specific Question:   Quantity     Answer:   14   • zolpidem (AMBIEN) 5 MG tablet     Sig: Take 1 tablet by mouth At Night As Needed for Sleep for up to 30 days.     Dispense:  14 tablet     Refill:  0       FOLLOW UP:  Return in about 4 weeks (around 9/14/2022) for Recheck.    I spent 30 minutes caring for Ms. Robert on this date of service. This time includes time spent by me in the following activities: preparing for the visit, obtaining and/or reviewing a separately obtained history, performing a medically appropriate examination and/or evaluation, counseling and educating the patient/family/caregiver, ordering medications, tests, or procedures and documenting information in the medical record.           This document has been electronically signed by DEEPA Villavicencio  August 23, 2022 09:28 EDT    Please note that portions of this note were completed with a voice recognition program. Efforts were made to edit dictation, but occasionally  words are mistranscribed.

## 2022-08-22 ENCOUNTER — TELEPHONE (OUTPATIENT)
Dept: PSYCHIATRY | Facility: CLINIC | Age: 57
End: 2022-08-22

## 2022-08-22 NOTE — TELEPHONE ENCOUNTER
Pt states that she doesn't want to go back and forth with me that she said she is having difficulty and asked to speak to the provider, she doesn't want o go back and forth. Scheduled on 08/24/22. Pt is backing vraylar back down to 1.5 mg from 3 mg due to clenching of jaw is worse.

## 2022-08-22 NOTE — TELEPHONE ENCOUNTER
Pt states started Vraylar 3 mg she is having trouble with akathisia, trouble with twitching of eye, movement of mouth and clenching of jaw. Sleep is much improved with Ambien would like to talk to provider.

## 2022-08-23 NOTE — TELEPHONE ENCOUNTER
Patient called in to make sure we had received the [employer] results, also asked if any med changes had been made since reviewing the results. Advised that med discussion would be had at appointment tomorrow. She said that would be fine. Melody also stated that she did NOT decrease the dose of med to 1.5 mg. She stayed at the 3  Mg. RUBA

## 2022-08-24 ENCOUNTER — TELEMEDICINE (OUTPATIENT)
Dept: PSYCHIATRY | Facility: CLINIC | Age: 57
End: 2022-08-24

## 2022-08-24 DIAGNOSIS — F41.1 GENERALIZED ANXIETY DISORDER: ICD-10-CM

## 2022-08-24 DIAGNOSIS — F31.9 BIPOLAR DEPRESSION: Primary | ICD-10-CM

## 2022-08-24 DIAGNOSIS — F51.05 INSOMNIA DUE TO MENTAL DISORDER: ICD-10-CM

## 2022-08-24 DIAGNOSIS — F43.10 POST TRAUMATIC STRESS DISORDER (PTSD): ICD-10-CM

## 2022-08-24 PROCEDURE — 99214 OFFICE O/P EST MOD 30 MIN: CPT | Performed by: NURSE PRACTITIONER

## 2022-08-24 RX ORDER — ASENAPINE 5 MG/1
5 TABLET SUBLINGUAL 2 TIMES DAILY
Qty: 60 TABLET | Refills: 1 | Status: SHIPPED | OUTPATIENT
Start: 2022-08-24 | End: 2022-09-06 | Stop reason: SINTOL

## 2022-08-24 NOTE — PROGRESS NOTES
"This provider is located at [facility], [address] [place] [address], [facility], [id number] using a secure [employer] Video Visit through [employer]. Patient is being seen remotely via telehealth at their home address in [place], and stated they are in a secure environment for this session. The patient's condition being diagnosed/treated is appropriate for telemedicine. The provider identified herself as well as her credentials.   The patient, and/or patients guardian, consent to be seen remotely, and when consent is given they understand that the consent allows for patient identifiable information to be sent to a third party as needed.   They may refuse to be seen remotely at any time. The electronic data is encrypted and password protected, and the patient and/or guardian has been advised of the potential risks to privacy not withstanding such measures.     You have chosen to receive care through a telehealth visit.  Do you consent to use a video/audio connection for your medical care today? Yes    Subjective   Eleanor Robert is a 57 y.o. female who presents today for follow up    Chief Complaint:  Insomnia, anxiety     History of Present Illness:   Eleanor Robert presents for medication management follow up via MyChart video visit.  Reports overall improvement in mood with Vraylar, but began to develop \"eye twitching and jaw tightening.\"  Says that she did start taking medication every other day and has noticed some improvement in jaw tightening and only occasional eye twitching.  She was able to send copy of previous [facility] testing as she has had adverse reactions to several medications in past.  Verbalizes that she is fearful to try new medication.  Says that she would prefer to continue Celexa as she feels this medication has been helpful in improving depressive symptoms. Has been sleeping for up to 3 hours at a time which she states is \"good\" for her.  Has been taking Ambien as needed that has been helpful with " initiating sleep.  Appetite has been good. Denies any current SI/HI or A/V hallucinations.     Previous Psych Meds: Several SSRI's, SNRI's, Seroquel (weight gain), Abilify (increased irritability and akathisia), Geodon (increased anxiety), Zyprexa (edema), Lithium (weight gain and hidradenitis), Temazepam  and Lorazepam     The following portions of the patient's history were reviewed and updated as appropriate: allergies, current medications, past family history, past medical history, past social history, past surgical history and problem list.      Past Medical History:  Past Medical History:   Diagnosis Date   • Alcohol abuse    • Anxiety    • Bipolar disorder (HCC)    • History of nephrectomy    • PTSD (post-traumatic stress disorder)        Social History:  Social History     Socioeconomic History   • Marital status:    Tobacco Use   • Smoking status: Former Smoker     Quit date: 2014     Years since quittin.4   • Smokeless tobacco: Never Used   Vaping Use   • Vaping Use: Never used   Substance and Sexual Activity   • Alcohol use: Not Currently     Comment: Recovering   • Drug use: Never   • Sexual activity: Defer       Family History:  Family History   Problem Relation Age of Onset   • Alcohol abuse Father    • Drug abuse Sister        Past Surgical History:  Past Surgical History:   Procedure Laterality Date   • NEPHRECTOMY Left        Problem List:  There is no problem list on file for this patient.      Allergy:   Allergies   Allergen Reactions   • Dilaudid [Hydromorphone Hcl] Anxiety   • Morphine Itching and Anxiety   • Sulfa Antibiotics Rash        Current Medications:   Current Outpatient Medications   Medication Sig Dispense Refill   • asenapine maleate (Saphris) 5 MG sublingual tablet sublingual tablet Place 1 tablet under the tongue 2 (Two) Times a Day for 60 days. Do not eat or drink for 10 minutes following administration. 60 tablet 1   • citalopram (CeleXA) 20 MG tablet      •  estradiol (ESTRACE) 2 MG tablet      • hydrOXYzine (ATARAX) 25 MG tablet Take 1-2 tablets by mouth At Night As Needed for Anxiety for up to 60 days. 60 tablet 1   • lamoTRIgine (LaMICtal) 150 MG tablet      • lamoTRIgine (LaMICtal) 25 MG tablet      • multivitamin with minerals tablet tablet      • tiZANidine (ZANAFLEX) 4 MG tablet      • topiramate (TOPAMAX) 100 MG tablet      • zolpidem (AMBIEN) 5 MG tablet Take 1 tablet by mouth At Night As Needed for Sleep for up to 30 days. 14 tablet 0     No current facility-administered medications for this visit.       Review of Symptoms:    Review of Systems   Constitutional: Negative for activity change, appetite change and fatigue.   Respiratory: Negative for shortness of breath.    Cardiovascular: Negative for chest pain.   Psychiatric/Behavioral: Positive for agitation, sleep disturbance, depressed mood and stress. Negative for hallucinations and suicidal ideas. The patient is nervous/anxious.      Physical Exam:   Physical Exam  Constitutional:       General: She is not in acute distress.     Appearance: Normal appearance.   Neurological:      Mental Status: She is alert.     Vitals:   There were no vitals taken for this visit. There is no height or weight on file to calculate BMI.  Due to extenuating circumstances and possible current health risks associated with the patient being present in a clinical setting (with current health restrictions in place in regards to possible COVID 19 transmission/exposure), the patient was seen remotely today via a MyChart Video Visit through [employer].  Unable to obtain vital signs due to nature of remote visit.    Mental Status Exam:   Hygiene:   appears good  Cooperation:  Cooperative  Eye Contact:  ANA MARIA due to video visit  Psychomotor Behavior:  Restless  Affect:  Appropriate  Mood: anxious  Hopelessness: Denies  Speech:  Rapid  Thought Process:  Goal directed and Linear  Thought Content:  Mood congruent  Suicidal:  None  Homicidal:   None  Hallucinations:  None  Delusion:  None  Memory:  Intact  Orientation:  Person, Place, Time and Situation  Reliability:  fair  Insight:  Fair  Judgement:  Fair  Impulse Control:  Fair    Lab Results:   Orders Only on 08/04/2022   Component Date Value Ref Range Status   • Report Summary 08/04/2022 FINAL   Final    Comment: ====================================================================  [facility] COMP DRUG ANALYSIS,UR  ====================================================================  Specimen Alert  Note:  Urinary creatinine is low; ability to detect some  drugs may be compromised.  Interpret results  with caution.  ====================================================================  Test                             Result       Flag       Units  Drug Present    Topiramate                     PRESENT    Lamotrigine                    PRESENT    Citalopram                     PRESENT    Desmethylcitalopram            PRESENT     Desmethylcitalopram is an expected metabolite of citalopram or     the enantiomeric form, escitalopram.    Doxepin                        PRESENT    Acetaminophen                  PRESENT    Ibuprofen                      PRESENT  ====================================================================  Test                      Result    Flag   Units      Ref Range    Creat                           inine              14        L      mg/dL      >=20  ====================================================================  Declared Medications:   Medication list was not provided.  ====================================================================  For clinical consultation, please call [phone].  ====================================================================     Lab on 08/04/2022   Component Date Value Ref Range Status   • Glucose 08/04/2022 93  65 - 99 mg/dL Final   • BUN 08/04/2022 16  6 - 20 mg/dL Final   • Creatinine 08/04/2022 0.98  0.57 - 1.00 mg/dL Final   •  Sodium 08/04/2022 141  136 - 145 mmol/L Final   • Potassium 08/04/2022 5.1  3.5 - 5.2 mmol/L Final   • Chloride 08/04/2022 107  98 - 107 mmol/L Final   • CO2 08/04/2022 23.9  22.0 - 29.0 mmol/L Final   • Calcium 08/04/2022 9.1  8.6 - 10.5 mg/dL Final   • Total Protein 08/04/2022 6.5  6.0 - 8.5 g/dL Final   • Albumin 08/04/2022 4.30  3.50 - 5.20 g/dL Final   • ALT (SGPT) 08/04/2022 15  1 - 33 U/L Final   • AST (SGOT) 08/04/2022 20  1 - 32 U/L Final   • Alkaline Phosphatase 08/04/2022 48  39 - 117 U/L Final   • Total Bilirubin 08/04/2022 0.2  0.0 - 1.2 mg/dL Final   • Globulin 08/04/2022 2.2  gm/dL Final   • A/G Ratio 08/04/2022 2.0  g/dL Final   • BUN/Creatinine Ratio 08/04/2022 16.3  7.0 - 25.0 Final   • Anion Gap 08/04/2022 10.1  5.0 - 15.0 mmol/L Final   • eGFR 08/04/2022 67.9  >60.0 mL/min/1.73 Final    National Kidney Foundation and American Society of Nephrology (ASN) Task Force recommended calculation based on the Chronic Kidney Disease Epidemiology Collaboration (CKD-EPI) equation refit without adjustment for race.   • Hemoglobin A1C 08/04/2022 5.10  4.80 - 5.60 % Final   • Total Cholesterol 08/04/2022 164  0 - 200 mg/dL Final   • Triglycerides 08/04/2022 40  0 - 150 mg/dL Final   • HDL Cholesterol 08/04/2022 69 (A) 40 - 60 mg/dL Final   • LDL Cholesterol  08/04/2022 86  0 - 100 mg/dL Final   • VLDL Cholesterol 08/04/2022 9  5 - 40 mg/dL Final   • LDL/HDL Ratio 08/04/2022 1.26   Final   • Vitamin B-12 08/04/2022 978 (A) 211 - 946 pg/mL Final   • TSH 08/04/2022 1.200  0.270 - 4.200 uIU/mL Final   • Free T4 08/04/2022 0.90 (A) 0.93 - 1.70 ng/dL Final   • WBC 08/04/2022 4.36  3.40 - 10.80 10*3/mm3 Final   • RBC 08/04/2022 4.29  3.77 - 5.28 10*6/mm3 Final   • Hemoglobin 08/04/2022 13.9  12.0 - 15.9 g/dL Final   • Hematocrit 08/04/2022 40.5  34.0 - 46.6 % Final   • MCV 08/04/2022 94.4  79.0 - 97.0 fL Final   • MCH 08/04/2022 32.4  26.6 - 33.0 pg Final   • MCHC 08/04/2022 34.3  31.5 - 35.7 g/dL Final   • RDW  08/04/2022 12.4  12.3 - 15.4 % Final   • RDW-SD 08/04/2022 42.1  37.0 - 54.0 fl Final   • MPV 08/04/2022 10.0  6.0 - 12.0 fL Final   • Platelets 08/04/2022 232  140 - 450 10*3/mm3 Final   • Neutrophil % 08/04/2022 56.6  42.7 - 76.0 % Final   • Lymphocyte % 08/04/2022 32.6  19.6 - 45.3 % Final   • Monocyte % 08/04/2022 6.9  5.0 - 12.0 % Final   • Eosinophil % 08/04/2022 2.8  0.3 - 6.2 % Final   • Basophil % 08/04/2022 0.9  0.0 - 1.5 % Final   • Immature Grans % 08/04/2022 0.2  0.0 - 0.5 % Final   • Neutrophils, Absolute 08/04/2022 2.47  1.70 - 7.00 10*3/mm3 Final   • Lymphocytes, Absolute 08/04/2022 1.42  0.70 - 3.10 10*3/mm3 Final   • Monocytes, Absolute 08/04/2022 0.30  0.10 - 0.90 10*3/mm3 Final   • Eosinophils, Absolute 08/04/2022 0.12  0.00 - 0.40 10*3/mm3 Final   • Basophils, Absolute 08/04/2022 0.04  0.00 - 0.20 10*3/mm3 Final   • Immature Grans, Absolute 08/04/2022 0.01  0.00 - 0.05 10*3/mm3 Final   • nRBC 08/04/2022 0.0  0.0 - 0.2 /100 WBC Final   Office Visit on 08/04/2022   Component Date Value Ref Range Status   • Report Summary 08/04/2022 FINAL   Final    Comment: ====================================================================  [facility] COMP DRUG ANALYSIS,UR  ====================================================================  Test                             Result       Flag       Units  Drug Present    Alpha-hydroxyalprazolam        84                      ng/mg creat     Alpha-hydroxyalprazolam is an expected metabolite of alprazolam.     Source of alprazolam is a scheduled prescription medication.    Topiramate                     PRESENT    Lamotrigine                    PRESENT    Citalopram                     PRESENT    Desmethylcitalopram            PRESENT     Desmethylcitalopram is an expected metabolite of citalopram or     the enantiomeric form, escitalopram.    Doxepin                        PRESENT    Acetaminophen                  PRESENT    Ibuprofen                       PRESENT  ====================================================================  Test                      Result    Flag   Units      Ref Range                               Creatinine              31               mg/dL      >=20  ====================================================================  Declared Medications:   Medication list was not provided.  ====================================================================  For clinical consultation, please call [phone].  ====================================================================   • 1,25-Dihydroxy, Vitamin D 08/04/2022 40.8  24.8 - 81.5 pg/mL Final                  **Please note reference interval change**       EKG Results:  No orders to display       Assessment & Plan   Problems Addressed this Visit    None     Visit Diagnoses     Bipolar depression (HCC)    -  Primary    Relevant Medications    asenapine maleate (Saphris) 5 MG sublingual tablet sublingual tablet    Generalized anxiety disorder        Relevant Medications    asenapine maleate (Saphris) 5 MG sublingual tablet sublingual tablet    Insomnia due to mental disorder        Relevant Medications    asenapine maleate (Saphris) 5 MG sublingual tablet sublingual tablet    Post traumatic stress disorder (PTSD)        Relevant Medications    asenapine maleate (Saphris) 5 MG sublingual tablet sublingual tablet      Diagnoses       Codes Comments    Bipolar depression (HCC)    -  Primary ICD-10-CM: F31.9  ICD-9-CM: 296.50     Generalized anxiety disorder     ICD-10-CM: F41.1  ICD-9-CM: 300.02     Insomnia due to mental disorder     ICD-10-CM: F51.05  ICD-9-CM: 300.9, 327.02     Post traumatic stress disorder (PTSD)     ICD-10-CM: F43.10  ICD-9-CM: 309.81           Visit Diagnoses:    ICD-10-CM ICD-9-CM   1. Bipolar depression (HCC)  F31.9 296.50   2. Generalized anxiety disorder  F41.1 300.02   3. Insomnia due to mental disorder  F51.05 300.9     327.02   4. Post traumatic stress disorder  (PTSD)  F43.10 309.81       -Reviewed previous available documentation and most recent available labs    -KIRSTY reviewed and is appropriate.    -The benefits of a healthy diet and exercise were discussed with patient, especially the positive effects they have on mental health. Patient encouraged to consider lifestyle modification regarding diet and exercise patterns to maximize results of mental health treatment.     -Discussed importance of counseling to decrease anxiety like symptoms. Discussed coping mechanisms to decrease stress and anxiety: relaxation techniques, guided imagery, music therapy, staying active, support groups, diversional activities and avoid aggravating factors.  Discussed different coping mechanisms to better control depression.    Encouraged patient to practice good sleep hygiene.  Discussed going to bed at the same time and getting up at the same time every day. Consider a quiet activity, such as reading, part of your nighttime routine. Make your bedroom a dark, comfortable place where it is easy to fall asleep. Avoid or limit caffeine consumption. Limit screen use, especially two hours prior to bed (this includes watching TV, using smartphone, tablet or computer).     Will stop Vraylar due to TD symptoms. Discussed past genetic testing with her and she is agreeable to start Sapris. Will continue Citalopram and Lamotrigine as previously prescribed.  -Continue Citalopram 30 mg daily for anxiety and depression  -Continue Lamotrigine 350 mg daily for mood stabilization   -Stop Vraylar due to TD symptoms  -Start Saphris 5 mg twice daily for mood    GOALS:  Short Term Goals: Patient will be compliant with medication, and patient will have no significant medication related side effects.  Patient will be engaged in psychotherapy as indicated.  Patient will report subjective improvement of symptoms.  Long term goals: To stabilize mood and treat/improve subjective symptoms, the patient will stay out  of the hospital, the patient will be at an optimal level of functioning, and the patient will take all medications as prescribed.  The patient/guardian verbalized understanding and agreement with goals that were mutually set.    TREATMENT PLAN: Continue supportive psychotherapy efforts and medications as indicated for patient's diagnosis.  Pharmacological and Non-Pharmacological treatment options discussed during today's visit. Patient/Guardian acknowledged and verbally consented with current treatment plan and was educated on the importance of compliance with treatment and follow-up appointments.      MEDICATION ISSUES:  Discussed medication options and treatment plan of prescribed medication as well as the risks, benefits, any black box warnings, and side effects including potential falls, possible impaired driving, and metabolic adversities among others. Patient is agreeable to call the office with any worsening of symptoms or onset of side effects, or if any concerns or questions arise.  The contact information for the office is made available to the patient. Patient is agreeable to call 911 or go to the nearest ER should they begin having any SI/HI, or if any urgent concerns arise. No medication side effects or related complaints today.     MEDS ORDERED DURING VISIT:  New Medications Ordered This Visit   Medications   • asenapine maleate (Saphris) 5 MG sublingual tablet sublingual tablet     Sig: Place 1 tablet under the tongue 2 (Two) Times a Day for 60 days. Do not eat or drink for 10 minutes following administration.     Dispense:  60 tablet     Refill:  1       FOLLOW UP:  Return in about 4 weeks (around 9/21/2022) for Recheck.    I spent 30 minutes caring for Ms. Robert on this date of service. This time includes time spent by me in the following activities: preparing for the visit, obtaining and/or reviewing a separately obtained history, performing a medically appropriate examination and/or evaluation,  counseling and educating the patient/family/caregiver, ordering medications, tests, or procedures and documenting information in the medical record.           This document has been electronically signed by DEEPA Villavicencio  August 24, 2022 16:54 EDT    Please note that portions of this note were completed with a voice recognition program. Efforts were made to edit dictation, but occasionally words are mistranscribed.

## 2022-09-01 ENCOUNTER — TELEPHONE (OUTPATIENT)
Dept: PSYCHIATRY | Facility: CLINIC | Age: 57
End: 2022-09-01

## 2022-09-01 ENCOUNTER — HOSPITAL ENCOUNTER (OUTPATIENT)
Dept: ULTRASOUND IMAGING | Facility: HOSPITAL | Age: 57
End: 2022-09-01

## 2022-09-01 NOTE — TELEPHONE ENCOUNTER
PT LEFT A MESSAGE FOR MARITZA THAT SHE SHE STARTED SAPHRIS YESTERDAY DUE TO PHARMACY HAD TO ORDER IT. SHE SAID SHE HAD A TERRIBLE REACTION SAID SHE CAN'T TAKE IT ANYMORE DUE TO THE REACTION. HER REACTION WAS HER HEAD FELT SWOLLEN AND LEGS FELT JUMPY. SHE IS SLEEPING 3 HOURS SOLID AND THEN WAKING UP BUT IS ABLE TO GO BACK TO SLEEP. SHE HAD TO RESCHEDULE CT DUE TO SHE HAS COVID. DO YOU WANT HER TO START VYVANSE OR WAIT.

## 2022-09-26 ENCOUNTER — TELEPHONE (OUTPATIENT)
Dept: PSYCHIATRY | Facility: CLINIC | Age: 57
End: 2022-09-26

## 2022-09-26 NOTE — TELEPHONE ENCOUNTER
Melody called in to see about results of her CPT test. She asked if she would get results before her scheduled apt, so that she could start meds? Thank you.

## 2022-09-28 ENCOUNTER — APPOINTMENT (OUTPATIENT)
Dept: ULTRASOUND IMAGING | Facility: HOSPITAL | Age: 57
End: 2022-09-28

## 2022-09-28 NOTE — TELEPHONE ENCOUNTER
Rx Refill Note  Requested Prescriptions     Pending Prescriptions Disp Refills   • Cariprazine HCl (Vraylar) 1.5 MG capsule capsule 30 capsule 0     Sig: Take 1 capsule by mouth Daily.      Last office visit with prescribing clinician: 8/4/2022      Next office visit with prescribing clinician: 10/6/2022            Joesph Jackson MA  09/28/22, 12:39 EDT

## 2022-10-04 ENCOUNTER — APPOINTMENT (OUTPATIENT)
Dept: ULTRASOUND IMAGING | Facility: HOSPITAL | Age: 57
End: 2022-10-04

## 2022-10-06 ENCOUNTER — TELEMEDICINE (OUTPATIENT)
Dept: PSYCHIATRY | Facility: CLINIC | Age: 57
End: 2022-10-06

## 2022-10-06 DIAGNOSIS — F41.1 GENERALIZED ANXIETY DISORDER: ICD-10-CM

## 2022-10-06 DIAGNOSIS — F51.05 INSOMNIA DUE TO MENTAL DISORDER: ICD-10-CM

## 2022-10-06 DIAGNOSIS — F31.9 BIPOLAR DEPRESSION: ICD-10-CM

## 2022-10-06 DIAGNOSIS — F90.2 ATTENTION DEFICIT HYPERACTIVITY DISORDER, COMBINED TYPE: Primary | ICD-10-CM

## 2022-10-06 PROCEDURE — 99214 OFFICE O/P EST MOD 30 MIN: CPT | Performed by: NURSE PRACTITIONER

## 2022-10-06 RX ORDER — LISDEXAMFETAMINE DIMESYLATE 10 MG/1
10 CAPSULE ORAL EVERY MORNING
Qty: 30 CAPSULE | Refills: 0 | Status: SHIPPED | OUTPATIENT
Start: 2022-10-06 | End: 2022-10-26

## 2022-10-06 NOTE — PROGRESS NOTES
"This provider is located at [facility], [address] [place] [address], [facility], [id number] using a secure [employer] Video Visit through [employer]. Patient is being seen remotely via telehealth at their home address in [place], and stated they are in a secure environment for this session. The patient's condition being diagnosed/treated is appropriate for telemedicine. The provider identified herself as well as her credentials.   The patient, and/or patients guardian, consent to be seen remotely, and when consent is given they understand that the consent allows for patient identifiable information to be sent to a third party as needed.   They may refuse to be seen remotely at any time. The electronic data is encrypted and password protected, and the patient and/or guardian has been advised of the potential risks to privacy not withstanding such measures.     You have chosen to receive care through a telehealth visit.  Do you consent to use a video/audio connection for your medical care today? Yes    Subjective   Eleanor Robert is a 57 y.o. female who presents today for follow up    Chief Complaint: ADHD, bipolar disorder    History of Present Illness:   History of Present Illness  Eleanor Robert presents today for medication management follow-up via MyChart video visit.  Says that she feels her overall mood is \"good\" but feels that her symptoms associated with ADHD are out of control.  Reports that she feels Vraylar has continue to work well with mood stabilization, denying any episodes of depression or cycling of mood.  Verbalizes that the severity of ADHD symptoms have seemed to spiral, causing worsening anxiety as she cannot complete any tasks, read a book or even watch TV.  Tried several medications in the past for ADHD. Methylphenidate did not work well to control symptoms, was on Adderall times several years that worked well to control symptoms but caused persistent nausea and weight loss, later tried Vyvanse that worked " well.  Says that sleep has improved minimally, obtaining about 5 hours of sleep each night.  Reports that she falling asleep well, waking up after about 2 hours but is able to fall back to sleep. Appetite has been good. Denies any current SI/HI or A/V hallucinations.     Previous Psych Meds: Several SSRI's, SNRI's, Seroquel (weight gain), Abilify (increased irritability and akathisia), Geodon (increased anxiety), Zyprexa (edema), Lithium (weight gain and hidradenitis), Temazepam, Lorazepam, Saphris (increased sedation and brain fog), Ritalin (not effective) and Adderall (caused nausea and weight loss), Vyvanse (worked well).     The following portions of the patient's history were reviewed and updated as appropriate: allergies, current medications, past family history, past medical history, past social history, past surgical history and problem list.      Past Medical History:  Past Medical History:   Diagnosis Date   • Alcohol abuse    • Anxiety    • Bipolar disorder (HCC)    • History of nephrectomy    • PTSD (post-traumatic stress disorder)        Social History:  Social History     Socioeconomic History   • Marital status:    Tobacco Use   • Smoking status: Former Smoker     Quit date: 2014     Years since quittin.5   • Smokeless tobacco: Never Used   Vaping Use   • Vaping Use: Never used   Substance and Sexual Activity   • Alcohol use: Not Currently     Comment: Recovering   • Drug use: Never   • Sexual activity: Defer       Family History:  Family History   Problem Relation Age of Onset   • Alcohol abuse Father    • Drug abuse Sister        Past Surgical History:  Past Surgical History:   Procedure Laterality Date   • NEPHRECTOMY Left        Problem List:  There is no problem list on file for this patient.      Allergy:   Allergies   Allergen Reactions   • Dilaudid [Hydromorphone Hcl] Anxiety   • Morphine Itching and Anxiety   • Sulfa Antibiotics Rash        Current Medications:   Current  Outpatient Medications   Medication Sig Dispense Refill   • Cariprazine HCl (Vraylar) 1.5 MG capsule capsule Take 1 capsule by mouth Daily. 30 capsule 0   • citalopram (CeleXA) 20 MG tablet      • estradiol (ESTRACE) 2 MG tablet      • lamoTRIgine (LaMICtal) 150 MG tablet      • lamoTRIgine (LaMICtal) 25 MG tablet      • lisdexamfetamine dimesylate (Vyvanse) 10 MG capsule Take 1 capsule by mouth Every Morning 30 capsule 0   • multivitamin with minerals tablet tablet      • topiramate (TOPAMAX) 100 MG tablet        No current facility-administered medications for this visit.       Review of Symptoms:    Review of Systems   Constitutional: Negative for activity change, appetite change and fatigue.   Respiratory: Negative for shortness of breath.    Cardiovascular: Negative for chest pain.   Psychiatric/Behavioral: Positive for agitation, sleep disturbance, depressed mood and stress. Negative for hallucinations and suicidal ideas. The patient is nervous/anxious.      Physical Exam:   Physical Exam  Constitutional:       General: She is not in acute distress.     Appearance: Normal appearance.   Neurological:      Mental Status: She is alert.     Vitals:   There were no vitals taken for this visit. There is no height or weight on file to calculate BMI.  Due to extenuating circumstances and possible current health risks associated with the patient being present in a clinical setting (with current health restrictions in place in regards to possible COVID 19 transmission/exposure), the patient was seen remotely today via a MyChart Video Visit through [employer].  Unable to obtain vital signs due to nature of remote visit.    Mental Status Exam:   Hygiene:   appears good  Cooperation:  Cooperative  Eye Contact:  ANA MARIA due to video visit  Psychomotor Behavior:  Restless  Affect:  Appropriate  Mood: anxious  Hopelessness: Denies  Speech:  Rapid  Thought Process:  Goal directed and Linear  Thought Content:  Mood congruent  Suicidal:   None  Homicidal:  None  Hallucinations:  None  Delusion:  None  Memory:  Intact  Orientation:  Person, Place, Time and Situation  Reliability:  fair  Insight:  Fair  Judgement:  Fair  Impulse Control:  Fair    Lab Results:   Orders Only on 08/04/2022   Component Date Value Ref Range Status   • Report Summary 08/04/2022 FINAL   Final    Comment: ====================================================================  [facility] COMP DRUG ANALYSIS,[facility]  ====================================================================  Specimen Alert  Note:  Urinary creatinine is low; ability to detect some  drugs may be compromised.  Interpret results  with caution.  ====================================================================  Test                             Result       Flag       Units  Drug Present    Topiramate                     PRESENT    Lamotrigine                    PRESENT    Citalopram                     PRESENT    Desmethylcitalopram            PRESENT     Desmethylcitalopram is an expected metabolite of citalopram or     the enantiomeric form, escitalopram.    Doxepin                        PRESENT    Acetaminophen                  PRESENT    Ibuprofen                      PRESENT  ====================================================================  Test                      Result    Flag   Units      Ref Range    Creat                           inine              14        L      mg/dL      >=20  ====================================================================  Declared Medications:   Medication list was not provided.  ====================================================================  For clinical consultation, please call [phone].  ====================================================================     Lab on 08/04/2022   Component Date Value Ref Range Status   • Glucose 08/04/2022 93  65 - 99 mg/dL Final   • BUN 08/04/2022 16  6 - 20 mg/dL Final   • Creatinine 08/04/2022 0.98  0.57 - 1.00  mg/dL Final   • Sodium 08/04/2022 141  136 - 145 mmol/L Final   • Potassium 08/04/2022 5.1  3.5 - 5.2 mmol/L Final   • Chloride 08/04/2022 107  98 - 107 mmol/L Final   • CO2 08/04/2022 23.9  22.0 - 29.0 mmol/L Final   • Calcium 08/04/2022 9.1  8.6 - 10.5 mg/dL Final   • Total Protein 08/04/2022 6.5  6.0 - 8.5 g/dL Final   • Albumin 08/04/2022 4.30  3.50 - 5.20 g/dL Final   • ALT (SGPT) 08/04/2022 15  1 - 33 U/L Final   • AST (SGOT) 08/04/2022 20  1 - 32 U/L Final   • Alkaline Phosphatase 08/04/2022 48  39 - 117 U/L Final   • Total Bilirubin 08/04/2022 0.2  0.0 - 1.2 mg/dL Final   • Globulin 08/04/2022 2.2  gm/dL Final   • A/G Ratio 08/04/2022 2.0  g/dL Final   • BUN/Creatinine Ratio 08/04/2022 16.3  7.0 - 25.0 Final   • Anion Gap 08/04/2022 10.1  5.0 - 15.0 mmol/L Final   • eGFR 08/04/2022 67.9  >60.0 mL/min/1.73 Final    National Kidney Foundation and American Society of Nephrology (ASN) Task Force recommended calculation based on the Chronic Kidney Disease Epidemiology Collaboration (CKD-EPI) equation refit without adjustment for race.   • Hemoglobin A1C 08/04/2022 5.10  4.80 - 5.60 % Final   • Total Cholesterol 08/04/2022 164  0 - 200 mg/dL Final   • Triglycerides 08/04/2022 40  0 - 150 mg/dL Final   • HDL Cholesterol 08/04/2022 69 (A) 40 - 60 mg/dL Final   • LDL Cholesterol  08/04/2022 86  0 - 100 mg/dL Final   • VLDL Cholesterol 08/04/2022 9  5 - 40 mg/dL Final   • LDL/HDL Ratio 08/04/2022 1.26   Final   • Vitamin B-12 08/04/2022 978 (A) 211 - 946 pg/mL Final   • TSH 08/04/2022 1.200  0.270 - 4.200 uIU/mL Final   • Free T4 08/04/2022 0.90 (A) 0.93 - 1.70 ng/dL Final   • WBC 08/04/2022 4.36  3.40 - 10.80 10*3/mm3 Final   • RBC 08/04/2022 4.29  3.77 - 5.28 10*6/mm3 Final   • Hemoglobin 08/04/2022 13.9  12.0 - 15.9 g/dL Final   • Hematocrit 08/04/2022 40.5  34.0 - 46.6 % Final   • MCV 08/04/2022 94.4  79.0 - 97.0 fL Final   • MCH 08/04/2022 32.4  26.6 - 33.0 pg Final   • MCHC 08/04/2022 34.3  31.5 - 35.7 g/dL  Final   • RDW 08/04/2022 12.4  12.3 - 15.4 % Final   • RDW-SD 08/04/2022 42.1  37.0 - 54.0 fl Final   • MPV 08/04/2022 10.0  6.0 - 12.0 fL Final   • Platelets 08/04/2022 232  140 - 450 10*3/mm3 Final   • Neutrophil % 08/04/2022 56.6  42.7 - 76.0 % Final   • Lymphocyte % 08/04/2022 32.6  19.6 - 45.3 % Final   • Monocyte % 08/04/2022 6.9  5.0 - 12.0 % Final   • Eosinophil % 08/04/2022 2.8  0.3 - 6.2 % Final   • Basophil % 08/04/2022 0.9  0.0 - 1.5 % Final   • Immature Grans % 08/04/2022 0.2  0.0 - 0.5 % Final   • Neutrophils, Absolute 08/04/2022 2.47  1.70 - 7.00 10*3/mm3 Final   • Lymphocytes, Absolute 08/04/2022 1.42  0.70 - 3.10 10*3/mm3 Final   • Monocytes, Absolute 08/04/2022 0.30  0.10 - 0.90 10*3/mm3 Final   • Eosinophils, Absolute 08/04/2022 0.12  0.00 - 0.40 10*3/mm3 Final   • Basophils, Absolute 08/04/2022 0.04  0.00 - 0.20 10*3/mm3 Final   • Immature Grans, Absolute 08/04/2022 0.01  0.00 - 0.05 10*3/mm3 Final   • nRBC 08/04/2022 0.0  0.0 - 0.2 /100 WBC Final   Office Visit on 08/04/2022   Component Date Value Ref Range Status   • Report Summary 08/04/2022 FINAL   Final    Comment: ====================================================================  [facility] COMP DRUG ANALYSIS,[facility]  ====================================================================  Test                             Result       Flag       Units  Drug Present    Alpha-hydroxyalprazolam        84                      ng/mg creat     Alpha-hydroxyalprazolam is an expected metabolite of alprazolam.     Source of alprazolam is a scheduled prescription medication.    Topiramate                     PRESENT    Lamotrigine                    PRESENT    Citalopram                     PRESENT    Desmethylcitalopram            PRESENT     Desmethylcitalopram is an expected metabolite of citalopram or     the enantiomeric form, escitalopram.    Doxepin                        PRESENT    Acetaminophen                  PRESENT    Ibuprofen                       PRESENT  ====================================================================  Test                      Result    Flag   Units      Ref Range                               Creatinine              31               mg/dL      >=20  ====================================================================  Declared Medications:   Medication list was not provided.  ====================================================================  For clinical consultation, please call [phone].  ====================================================================   • 1,25-Dihydroxy, Vitamin D 08/04/2022 40.8  24.8 - 81.5 pg/mL Final                  **Please note reference interval change**       EKG Results:  No orders to display       Assessment & Plan   Problems Addressed this Visit    None     Visit Diagnoses     Attention deficit hyperactivity disorder, combined type    -  Primary    Relevant Medications    lisdexamfetamine dimesylate (Vyvanse) 10 MG capsule    Bipolar depression (HCC)        Relevant Medications    lisdexamfetamine dimesylate (Vyvanse) 10 MG capsule    Generalized anxiety disorder        Relevant Medications    lisdexamfetamine dimesylate (Vyvanse) 10 MG capsule    Insomnia due to mental disorder        Relevant Medications    lisdexamfetamine dimesylate (Vyvanse) 10 MG capsule      Diagnoses       Codes Comments    Attention deficit hyperactivity disorder, combined type    -  Primary ICD-10-CM: F90.2  ICD-9-CM: 314.01     Bipolar depression (HCC)     ICD-10-CM: F31.9  ICD-9-CM: 296.50     Generalized anxiety disorder     ICD-10-CM: F41.1  ICD-9-CM: 300.02     Insomnia due to mental disorder     ICD-10-CM: F51.05  ICD-9-CM: 300.9, 327.02           Visit Diagnoses:    ICD-10-CM ICD-9-CM   1. Attention deficit hyperactivity disorder, combined type  F90.2 314.01   2. Bipolar depression (HCC)  F31.9 296.50   3. Generalized anxiety disorder  F41.1 300.02   4. Insomnia due to mental disorder  F51.05 300.9      327.02       -Reviewed previous available documentation and most recent available labs.   KIRSTY reviewed and is appropriate. Patient counseled on use of controlled substances.  UDS on file from 8/4/2022 positive for alprazolam metabolite.  She did report taking half of her sister's alprazolam.  We will obtain updated urine drug screen at next visit.    -The benefits of a healthy diet and exercise were discussed with patient, especially the positive effects they have on mental health. Patient encouraged to consider lifestyle modification regarding diet and exercise patterns to maximize results of mental health treatment.     -Discussed importance of counseling to decrease anxiety like symptoms. Discussed coping mechanisms to decrease stress and anxiety: relaxation techniques, guided imagery, music therapy, staying active, support groups, diversional activities and avoid aggravating factors.  Discussed different coping mechanisms to better control depression.    Encouraged patient to practice good sleep hygiene.  Discussed going to bed at the same time and getting up at the same time every day. Consider a quiet activity, such as reading, part of your nighttime routine. Make your bedroom a dark, comfortable place where it is easy to fall asleep. Avoid or limit caffeine consumption. Limit screen use, especially two hours prior to bed (this includes watching TV, using smartphone, tablet or computer).     CPT completed on 9/23/2022. Results discussed, she has total of 4 atypical T-scores which is associated with a moderate likelihood of having a disorder characterized by attention deficits, such as ADHD.  Her profile of scores and response pattern indicates that she may have issues related to inattentiveness (strong indication) and vigilance (some indication).    We will continue medications as previously prescribed, adding medication for treatment of ADHD.  She feels that overall mood is well controlled and sleep has  improved.  Agreeable to start Vyvanse as she was previously on medication in past that worked well, with no adverse effects.  -Start Vyvanse 10 mg daily for ADHD symptoms.  -Continue Citalopram 30 mg daily for anxiety and depression  -Continue Lamotrigine 350 mg daily for mood stabilization   -Continue Vraylar 1.5 mg daily for mood stabilization, depression      GOALS:  Short Term Goals: Patient will be compliant with medication, and patient will have no significant medication related side effects.  Patient will be engaged in psychotherapy as indicated.  Patient will report subjective improvement of symptoms.  Long term goals: To stabilize mood and treat/improve subjective symptoms, the patient will stay out of the hospital, the patient will be at an optimal level of functioning, and the patient will take all medications as prescribed.  The patient/guardian verbalized understanding and agreement with goals that were mutually set.    TREATMENT PLAN: Continue supportive psychotherapy efforts and medications as indicated for patient's diagnosis.  Pharmacological and Non-Pharmacological treatment options discussed during today's visit. Patient/Guardian acknowledged and verbally consented with current treatment plan and was educated on the importance of compliance with treatment and follow-up appointments.      MEDICATION ISSUES:  Discussed medication options and treatment plan of prescribed medication as well as the risks, benefits, any black box warnings, and side effects including potential falls, possible impaired driving, and metabolic adversities among others. Patient is agreeable to call the office with any worsening of symptoms or onset of side effects, or if any concerns or questions arise.  The contact information for the office is made available to the patient. Patient is agreeable to call 911 or go to the nearest ER should they begin having any SI/HI, or if any urgent concerns arise. No medication side  effects or related complaints today.     MEDS ORDERED DURING VISIT:  New Medications Ordered This Visit   Medications   • lisdexamfetamine dimesylate (Vyvanse) 10 MG capsule     Sig: Take 1 capsule by mouth Every Morning     Dispense:  30 capsule     Refill:  0     No early fills.       FOLLOW UP:  Return in about 4 weeks (around 11/3/2022) for Recheck.    I spent 30 minutes caring for Ms. Robert on this date of service. This time includes time spent by me in the following activities: preparing for the visit, obtaining and/or reviewing a separately obtained history, performing a medically appropriate examination and/or evaluation, counseling and educating the patient/family/caregiver, ordering medications, tests, or procedures and documenting information in the medical record.           This document has been electronically signed by DEEPA Villavicencio  October 6, 2022 10:31 EDT    Please note that portions of this note were completed with a voice recognition program. Efforts were made to edit dictation, but occasionally words are mistranscribed.

## 2022-10-26 ENCOUNTER — OFFICE VISIT (OUTPATIENT)
Dept: BEHAVIORAL HEALTH | Facility: CLINIC | Age: 57
End: 2022-10-26

## 2022-10-26 VITALS — WEIGHT: 159 LBS | HEIGHT: 66 IN | BODY MASS INDEX: 25.55 KG/M2

## 2022-10-26 DIAGNOSIS — F90.2 ATTENTION DEFICIT HYPERACTIVITY DISORDER, COMBINED TYPE: ICD-10-CM

## 2022-10-26 DIAGNOSIS — Z79.899 ENCOUNTER FOR LONG-TERM (CURRENT) USE OF MEDICATIONS: ICD-10-CM

## 2022-10-26 DIAGNOSIS — F51.04 PSYCHOPHYSIOLOGICAL INSOMNIA: ICD-10-CM

## 2022-10-26 DIAGNOSIS — F41.1 GENERALIZED ANXIETY DISORDER: ICD-10-CM

## 2022-10-26 DIAGNOSIS — F31.81 BIPOLAR II DISORDER: Primary | ICD-10-CM

## 2022-10-26 PROCEDURE — 99214 OFFICE O/P EST MOD 30 MIN: CPT

## 2022-10-26 RX ORDER — LAMOTRIGINE 150 MG/1
300 TABLET ORAL DAILY
Qty: 60 TABLET | Refills: 1 | Status: SHIPPED | OUTPATIENT
Start: 2022-10-26 | End: 2022-10-27 | Stop reason: SDUPTHER

## 2022-10-26 RX ORDER — LAMOTRIGINE 25 MG/1
50 TABLET ORAL DAILY
Qty: 60 TABLET | Refills: 1 | Status: SHIPPED | OUTPATIENT
Start: 2022-10-26 | End: 2022-10-27 | Stop reason: SDUPTHER

## 2022-10-26 RX ORDER — TOPIRAMATE 100 MG/1
100 TABLET, FILM COATED ORAL 2 TIMES DAILY
Qty: 60 TABLET | Refills: 1 | Status: SHIPPED | OUTPATIENT
Start: 2022-10-26 | End: 2022-10-27 | Stop reason: SDUPTHER

## 2022-10-26 RX ORDER — CLONIDINE HYDROCHLORIDE 0.3 MG/1
0.3 TABLET ORAL NIGHTLY
Qty: 30 TABLET | Refills: 1 | Status: SHIPPED | OUTPATIENT
Start: 2022-10-26 | End: 2022-11-01

## 2022-10-26 RX ORDER — SAW/PYGEUM/BETA/HERB/D3/B6/ZN 30 MG-25MG
10 CAPSULE ORAL NIGHTLY
Qty: 30 TABLET | Refills: 1 | Status: SHIPPED | OUTPATIENT
Start: 2022-10-26 | End: 2022-11-23

## 2022-10-26 RX ORDER — HYDROXYZINE 50 MG/1
50 TABLET, FILM COATED ORAL NIGHTLY
Qty: 30 TABLET | Refills: 1 | Status: SHIPPED | OUTPATIENT
Start: 2022-10-26 | End: 2023-01-18

## 2022-10-26 RX ORDER — DEXTROAMPHETAMINE SACCHARATE, AMPHETAMINE ASPARTATE, DEXTROAMPHETAMINE SULFATE AND AMPHETAMINE SULFATE 7.5; 7.5; 7.5; 7.5 MG/1; MG/1; MG/1; MG/1
30 TABLET ORAL 2 TIMES DAILY
Qty: 60 TABLET | Refills: 0 | Status: SHIPPED | OUTPATIENT
Start: 2022-10-26 | End: 2022-10-27 | Stop reason: SDUPTHER

## 2022-10-26 RX ORDER — CITALOPRAM 20 MG/1
20 TABLET ORAL DAILY
Qty: 30 TABLET | Refills: 1 | Status: SHIPPED | OUTPATIENT
Start: 2022-10-26 | End: 2022-10-27 | Stop reason: SDUPTHER

## 2022-10-26 RX ORDER — HYDROXYZINE HYDROCHLORIDE 25 MG/1
25 TABLET, FILM COATED ORAL 2 TIMES DAILY
COMMUNITY
End: 2022-10-26
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
## 2022-10-27 DIAGNOSIS — F90.2 ATTENTION DEFICIT HYPERACTIVITY DISORDER, COMBINED TYPE: ICD-10-CM

## 2022-10-27 DIAGNOSIS — F41.1 GENERALIZED ANXIETY DISORDER: ICD-10-CM

## 2022-10-27 DIAGNOSIS — F31.81 BIPOLAR II DISORDER: ICD-10-CM

## 2022-10-27 RX ORDER — LAMOTRIGINE 25 MG/1
50 TABLET ORAL DAILY
Qty: 180 TABLET | Refills: 0 | Status: SHIPPED | OUTPATIENT
Start: 2022-10-27 | End: 2022-11-23 | Stop reason: DRUGHIGH

## 2022-10-27 RX ORDER — TOPIRAMATE 100 MG/1
100 TABLET, FILM COATED ORAL 2 TIMES DAILY
Qty: 180 TABLET | Refills: 0 | Status: SHIPPED | OUTPATIENT
Start: 2022-10-27 | End: 2022-11-23 | Stop reason: SDUPTHER

## 2022-10-27 RX ORDER — LAMOTRIGINE 150 MG/1
300 TABLET ORAL DAILY
Qty: 180 TABLET | Refills: 0 | Status: SHIPPED | OUTPATIENT
Start: 2022-10-27 | End: 2022-11-23 | Stop reason: DRUGHIGH

## 2022-10-27 RX ORDER — CITALOPRAM 20 MG/1
20 TABLET ORAL DAILY
Qty: 90 TABLET | Refills: 0 | Status: SHIPPED | OUTPATIENT
Start: 2022-10-27 | End: 2022-11-23 | Stop reason: SDUPTHER

## 2022-10-27 RX ORDER — DEXTROAMPHETAMINE SACCHARATE, AMPHETAMINE ASPARTATE, DEXTROAMPHETAMINE SULFATE AND AMPHETAMINE SULFATE 7.5; 7.5; 7.5; 7.5 MG/1; MG/1; MG/1; MG/1
30 TABLET ORAL 2 TIMES DAILY
Qty: 60 TABLET | Refills: 0 | Status: SHIPPED | OUTPATIENT
Start: 2022-10-27 | End: 2022-11-23 | Stop reason: SDUPTHER

## 2022-10-27 NOTE — TELEPHONE ENCOUNTER
Patient states that her [facility] pharmacy does not have the 30 mg adderal and would like that prescription sent to [facility] in [facility] please. Then with the topamax, celexa and the lamictal needs to be sent to the [facility] pharmacy thank you.

## 2022-10-28 ENCOUNTER — TELEPHONE (OUTPATIENT)
Dept: BEHAVIORAL HEALTH | Facility: CLINIC | Age: 57
End: 2022-10-28

## 2022-10-31 ENCOUNTER — TELEPHONE (OUTPATIENT)
Dept: FAMILY MEDICINE CLINIC | Facility: CLINIC | Age: 57
End: 2022-10-31

## 2022-10-31 NOTE — TELEPHONE ENCOUNTER
PATIENT SAW ANJELICA LAST WED HAD BEEN ON [facility]RAYLAR STOPPED IT SAT STARTED FEELING DOWN TEARING UP. SAD TROUBLE SHE HAD WITH BRAYLAR FELT NUMB AND HAD WEIGHT GAIN. WANTS A CALL BACK AS TO SEE IF SHE WILL PRESCRIBE SOMETHING ELSE FOR HER NEEDS SOMETHING.

## 2022-11-01 ENCOUNTER — TELEPHONE (OUTPATIENT)
Dept: BEHAVIORAL HEALTH | Facility: CLINIC | Age: 57
End: 2022-11-01

## 2022-11-01 ENCOUNTER — DOCUMENTATION (OUTPATIENT)
Dept: BEHAVIORAL HEALTH | Facility: CLINIC | Age: 57
End: 2022-11-01

## 2022-11-01 DIAGNOSIS — F31.81 BIPOLAR II DISORDER: Primary | ICD-10-CM

## 2022-11-01 DIAGNOSIS — F51.04 PSYCHOPHYSIOLOGICAL INSOMNIA: ICD-10-CM

## 2022-11-01 RX ORDER — ZOLPIDEM TARTRATE 5 MG/1
5 TABLET ORAL NIGHTLY PRN
Qty: 30 TABLET | Refills: 0 | Status: SHIPPED | OUTPATIENT
Start: 2022-11-01 | End: 2022-11-23 | Stop reason: DRUGHIGH

## 2022-11-01 NOTE — TELEPHONE ENCOUNTER
Called patient to discuss medications and recent messages left. Call went straight to voicemail. Left message to return call.

## 2022-11-03 LAB — DRUGS UR: NORMAL

## 2022-11-23 ENCOUNTER — TELEMEDICINE (OUTPATIENT)
Dept: BEHAVIORAL HEALTH | Facility: CLINIC | Age: 57
End: 2022-11-23

## 2022-11-23 DIAGNOSIS — F41.1 GENERALIZED ANXIETY DISORDER: ICD-10-CM

## 2022-11-23 DIAGNOSIS — F90.2 ATTENTION DEFICIT HYPERACTIVITY DISORDER, COMBINED TYPE: ICD-10-CM

## 2022-11-23 DIAGNOSIS — F31.81 BIPOLAR II DISORDER: Primary | ICD-10-CM

## 2022-11-23 DIAGNOSIS — F51.04 PSYCHOPHYSIOLOGICAL INSOMNIA: ICD-10-CM

## 2022-11-23 PROCEDURE — 99214 OFFICE O/P EST MOD 30 MIN: CPT

## 2022-11-23 RX ORDER — TOPIRAMATE 100 MG/1
100 TABLET, FILM COATED ORAL 2 TIMES DAILY
Qty: 180 TABLET | Refills: 0 | Status: SHIPPED | OUTPATIENT
Start: 2022-11-23 | End: 2023-01-25 | Stop reason: SDUPTHER

## 2022-11-23 RX ORDER — CITALOPRAM 20 MG/1
20 TABLET ORAL DAILY
Qty: 90 TABLET | Refills: 0 | Status: SHIPPED | OUTPATIENT
Start: 2022-11-23 | End: 2023-01-25 | Stop reason: SDUPTHER

## 2022-11-23 RX ORDER — BUPROPION HYDROCHLORIDE 150 MG/1
150 TABLET ORAL EVERY MORNING
Qty: 30 TABLET | Refills: 1 | Status: SHIPPED | OUTPATIENT
Start: 2022-11-23 | End: 2023-01-17

## 2022-11-23 RX ORDER — ZOLPIDEM TARTRATE 10 MG/1
10 TABLET ORAL NIGHTLY PRN
Qty: 30 TABLET | Refills: 0 | Status: SHIPPED | OUTPATIENT
Start: 2022-11-23 | End: 2022-12-20 | Stop reason: SDUPTHER

## 2022-11-23 RX ORDER — LAMOTRIGINE 200 MG/1
200 TABLET ORAL 2 TIMES DAILY
Qty: 180 TABLET | Refills: 0 | Status: SHIPPED | OUTPATIENT
Start: 2022-11-23 | End: 2023-01-25 | Stop reason: SDUPTHER

## 2022-11-23 RX ORDER — DEXTROAMPHETAMINE SACCHARATE, AMPHETAMINE ASPARTATE, DEXTROAMPHETAMINE SULFATE AND AMPHETAMINE SULFATE 7.5; 7.5; 7.5; 7.5 MG/1; MG/1; MG/1; MG/1
30 TABLET ORAL 2 TIMES DAILY
Qty: 60 TABLET | Refills: 0 | Status: SHIPPED | OUTPATIENT
Start: 2022-11-23 | End: 2023-01-09 | Stop reason: SDUPTHER

## 2022-11-23 NOTE — PROGRESS NOTES
This provider is located at [facility] ,[address], [address] in [place], [place],using a secure MyChart Video Visit through [employer]. Patient is being seen remotely via telehealth at their home address in [place], and stated they are in a secure environment for this session. The patient's condition being diagnosed/treated is appropriate for telemedicine. The provider identified herself as well as her credentials.   The patient, and/or patients guardian, consent to be seen remotely, and when consent is given they understand that the consent allows for patient identifiable information to be sent to a third party as needed.   They may refuse to be seen remotely at any time. The electronic data is encrypted and password protected, and the patient and/or guardian has been advised of the potential risks to privacy not withstanding such measures.    Video Visit    Patient Name: Eleanor Robert  : 1965   MRN: [id number]   Care Team: Patient Care Team:  Dewey Johnson MD as PCP - General (Family Medicine)  Lia Marroquin APRN as Nurse Practitioner (Behavioral Health)    {CC  Encounters  Notes :23}     Chief Complaint:    Chief Complaint   Patient presents with   • ADHD   • Bipolar 2   • Anxiety   • Sleeping Problem   • Med Management       History of Present Illness: Eleanor Robert is a 57 y.o. female who presents via video visit for a medication management follow up. Patient reports that her focus and concentration have improved significantly since staring the Adderall. Her mood on the other hand is not doing well. She has started to feel more depressed and states that things that normally wouldn't bother her have started to upset her. She is tearful in today's visit, expressing she doesn't know what is wrong with her. Sleep is still a struggle. She states she sleeps about 3-4 hours at a time.     Subjective   Review of Systems:    Review of  Systems   Psychiatric/Behavioral: Positive for depressed mood. The patient is nervous/anxious.    All other systems reviewed and are negative.      Current Medications:   Current Outpatient Medications   Medication Sig Dispense Refill   • amphetamine-dextroamphetamine (Adderall) 30 MG tablet Take 1 tablet by mouth 2 (Two) Times a Day. 60 tablet 0   • Cariprazine HCl (Vraylar) 1.5 MG capsule capsule Take 1 capsule by mouth Daily. 30 capsule 1   • citalopram (CeleXA) 20 MG tablet Take 1 tablet by mouth Daily. 90 tablet 0   • topiramate (TOPAMAX) 100 MG tablet Take 1 tablet by mouth 2 (Two) Times a Day. 180 tablet 0   • buPROPion XL (Wellbutrin XL) 150 MG 24 hr tablet Take 1 tablet by mouth Every Morning. 30 tablet 1   • estradiol (ESTRACE) 2 MG tablet      • hydrOXYzine (ATARAX) 50 MG tablet Take 1 tablet by mouth Every Night. 30 tablet 1   • lamoTRIgine (LaMICtal) 200 MG tablet Take 1 tablet by mouth 2 (Two) Times a Day. 180 tablet 0   • multivitamin with minerals tablet tablet      • zolpidem (Ambien) 10 MG tablet Take 1 tablet by mouth At Night As Needed for Sleep. 30 tablet 0     No current facility-administered medications for this visit.       Mental Status Exam:   Hygiene:   good  Cooperation:  Cooperative  Eye Contact:  Good  Psychomotor Behavior:  unable to assess  Affect:  Appropriate  Mood: sad and depressed  Speech:  Normal  Thought Process:  Goal directed and Linear  Thought Content:  Normal and Mood congruent  Suicidal:  None  Homicidal:  None  Hallucinations:  None  Delusion:  None  Memory:  Intact  Orientation:  Person, Place, Time and Situation  Reliability:  good  Insight:  Good  Judgement:  Good  Impulse Control:  Good  Physical/Medical Issues:  No      Objective   Vital Signs:   There were no vitals taken for this visit.      Assessment / Plan    Diagnoses and all orders for this visit:    1. Bipolar II disorder (HCC) (Primary)  -     lamoTRIgine (LaMICtal) 200 MG tablet; Take 1 tablet by mouth 2  (Two) Times a Day.  Dispense: 180 tablet; Refill: 0  -     topiramate (TOPAMAX) 100 MG tablet; Take 1 tablet by mouth 2 (Two) Times a Day.  Dispense: 180 tablet; Refill: 0  -     citalopram (CeleXA) 20 MG tablet; Take 1 tablet by mouth Daily.  Dispense: 90 tablet; Refill: 0  -     Cariprazine HCl (Vraylar) 1.5 MG capsule capsule; Take 1 capsule by mouth Daily.  Dispense: 30 capsule; Refill: 1  -     buPROPion XL (Wellbutrin XL) 150 MG 24 hr tablet; Take 1 tablet by mouth Every Morning.  Dispense: 30 tablet; Refill: 1    2. Generalized anxiety disorder  -     topiramate (TOPAMAX) 100 MG tablet; Take 1 tablet by mouth 2 (Two) Times a Day.  Dispense: 180 tablet; Refill: 0  -     citalopram (CeleXA) 20 MG tablet; Take 1 tablet by mouth Daily.  Dispense: 90 tablet; Refill: 0  -     buPROPion XL (Wellbutrin XL) 150 MG 24 hr tablet; Take 1 tablet by mouth Every Morning.  Dispense: 30 tablet; Refill: 1    3. Attention deficit hyperactivity disorder, combined type  -     amphetamine-dextroamphetamine (Adderall) 30 MG tablet; Take 1 tablet by mouth 2 (Two) Times a Day.  Dispense: 60 tablet; Refill: 0    4. Psychophysiological insomnia  -     zolpidem (Ambien) 10 MG tablet; Take 1 tablet by mouth At Night As Needed for Sleep.  Dispense: 30 tablet; Refill: 0      Will add Wellbutrin daily and increase Ambien. Continue all other medication as ordered.     A psychological evaluation was conducted in order to assess past and current level of functioning. Areas assessed included, but were not limited to: perception of social support, perception of ability to face and deal with challenges in life (positive functioning), anxiety symptoms, depressive symptoms, perspective on beliefs/belief system, coping skills for stress, intelligence level,  Therapeutic rapport was established. Interventions conducted today were geared towards incorporating medication management along with support for continued therapy. Education was also provided  as to the med management with this provider and what to expect in subsequent sessions.      We discussed risks, benefits, goals and side effects of the above medication and the patient was agreeable with the plan. Patient was educated on the importance of compliance with treatment and follow-up appointments. Patient is aware to contact the [facility] with any worsening of symptoms. To call for questions or concerns and return early if necessary. Patent is agreeable to go to the Emergency Department or call 911 should they begin SI/HI.      MEDS ORDERED DURING VISIT:  New Medications Ordered This Visit   Medications   • lamoTRIgine (LaMICtal) 200 MG tablet     Sig: Take 1 tablet by mouth 2 (Two) Times a Day.     Dispense:  180 tablet     Refill:  0   • topiramate (TOPAMAX) 100 MG tablet     Sig: Take 1 tablet by mouth 2 (Two) Times a Day.     Dispense:  180 tablet     Refill:  0   • citalopram (CeleXA) 20 MG tablet     Sig: Take 1 tablet by mouth Daily.     Dispense:  90 tablet     Refill:  0   • Cariprazine HCl (Vraylar) 1.5 MG capsule capsule     Sig: Take 1 capsule by mouth Daily.     Dispense:  30 capsule     Refill:  1   • amphetamine-dextroamphetamine (Adderall) 30 MG tablet     Sig: Take 1 tablet by mouth 2 (Two) Times a Day.     Dispense:  60 tablet     Refill:  0   • zolpidem (Ambien) 10 MG tablet     Sig: Take 1 tablet by mouth At Night As Needed for Sleep.     Dispense:  30 tablet     Refill:  0   • buPROPion XL (Wellbutrin XL) 150 MG 24 hr tablet     Sig: Take 1 tablet by mouth Every Morning.     Dispense:  30 tablet     Refill:  1         Follow Up   Return in about 8 weeks (around 1/18/2023).  Patient was given instructions and counseling regarding her condition or for health maintenance advice. Please see specific information pulled into the AVS if appropriate.     TREATMENT PLAN/GOALS: Continue supportive psychotherapy efforts and medications as indicated. Treatment and medication options  discussed during today's visit. Patient acknowledged and verbally consented to continue with current treatment plan and was educated on the importance of compliance with treatment and follow-up appointments.    MEDICATION ISSUES:  Discussed medication options and treatment plan of prescribed medication as well as the risks, benefits, and side effects including potential falls, possible impaired driving and metabolic adversities among others. Patient is agreeable to call the office with any worsening of symptoms or onset of side effects. Patient is agreeable to call 911 or go to the nearest ER should he/she begin having SI/HI.    [facility] [address] [place] [address]  [phone]    November 23, 2022 11:59 EST

## 2022-12-09 ENCOUNTER — TELEPHONE (OUTPATIENT)
Dept: INTERNAL MEDICINE | Facility: CLINIC | Age: 57
End: 2022-12-09

## 2022-12-09 NOTE — TELEPHONE ENCOUNTER
Patient called and states she started wellbutrin 150mg a few weeks ago and does not feel its helping. She still feels down and has no motivation. She would like to discuss with nurse.

## 2022-12-20 DIAGNOSIS — F51.04 PSYCHOPHYSIOLOGICAL INSOMNIA: ICD-10-CM

## 2022-12-20 RX ORDER — ZOLPIDEM TARTRATE 10 MG/1
10 TABLET ORAL NIGHTLY PRN
Qty: 30 TABLET | Refills: 0 | Status: SHIPPED | OUTPATIENT
Start: 2022-12-20 | End: 2023-01-18

## 2023-01-09 DIAGNOSIS — F90.2 ATTENTION DEFICIT HYPERACTIVITY DISORDER, COMBINED TYPE: ICD-10-CM

## 2023-01-09 RX ORDER — DEXTROAMPHETAMINE SACCHARATE, AMPHETAMINE ASPARTATE, DEXTROAMPHETAMINE SULFATE AND AMPHETAMINE SULFATE 7.5; 7.5; 7.5; 7.5 MG/1; MG/1; MG/1; MG/1
30 TABLET ORAL 2 TIMES DAILY
Qty: 60 TABLET | Refills: 0 | Status: SHIPPED | OUTPATIENT
Start: 2023-01-09 | End: 2023-02-09 | Stop reason: SDUPTHER

## 2023-01-17 DIAGNOSIS — F41.1 GENERALIZED ANXIETY DISORDER: ICD-10-CM

## 2023-01-17 DIAGNOSIS — F31.81 BIPOLAR II DISORDER: ICD-10-CM

## 2023-01-17 DIAGNOSIS — F51.04 PSYCHOPHYSIOLOGICAL INSOMNIA: ICD-10-CM

## 2023-01-17 RX ORDER — BUPROPION HYDROCHLORIDE 150 MG/1
150 TABLET ORAL EVERY MORNING
Qty: 30 TABLET | Refills: 1 | Status: SHIPPED | OUTPATIENT
Start: 2023-01-17 | End: 2023-01-25 | Stop reason: SDUPTHER

## 2023-01-17 NOTE — TELEPHONE ENCOUNTER
Rx Refill Note  Requested Prescriptions     Pending Prescriptions Disp Refills   • buPROPion XL (WELLBUTRIN XL) 150 MG 24 hr tablet [Pharmacy Med Name: BUPROPION XL 150MG TABLETS (24 H)] 30 tablet 1     Sig: TAKE 1 TABLET BY MOUTH EVERY MORNING      Last office visit with prescribing clinician: 10/26/2022   Last telemedicine visit with prescribing clinician: 1/25/2023   Next office visit with prescribing clinician: 1/25/2023                         Would you like a call back once the refill request has been completed: [] Yes [] No    If the office needs to give you a call back, can they leave a voicemail: [] Yes [] No    Luann Farmer MA  01/17/23, 12:18 EST   No

## 2023-01-18 DIAGNOSIS — F51.04 PSYCHOPHYSIOLOGICAL INSOMNIA: ICD-10-CM

## 2023-01-18 RX ORDER — HYDROXYZINE 50 MG/1
TABLET, FILM COATED ORAL
Qty: 30 TABLET | Refills: 1 | Status: SHIPPED | OUTPATIENT
Start: 2023-01-18 | End: 2023-02-14 | Stop reason: SDUPTHER

## 2023-01-18 RX ORDER — ZOLPIDEM TARTRATE 10 MG/1
10 TABLET ORAL NIGHTLY PRN
Qty: 30 TABLET | Refills: 0 | Status: SHIPPED | OUTPATIENT
Start: 2023-01-18 | End: 2023-02-14 | Stop reason: SDUPTHER

## 2023-01-18 RX ORDER — CARIPRAZINE 1.5 MG/1
CAPSULE, GELATIN COATED ORAL
Qty: 30 CAPSULE | Refills: 1 | Status: SHIPPED | OUTPATIENT
Start: 2023-01-18 | End: 2023-01-25 | Stop reason: SINTOL

## 2023-01-18 NOTE — TELEPHONE ENCOUNTER
Rx Refill Note  Requested Prescriptions     Pending Prescriptions Disp Refills   • zolpidem (AMBIEN) 10 MG tablet [Pharmacy Med Name: ZOLPIDEM 10MG TABLETS] 30 tablet      Sig: TAKE 1 TABLET BY MOUTH AT NIGHT AS NEEDED FOR SLEEP      Last office visit with prescribing clinician: 10/26/2022   Last telemedicine visit with prescribing clinician: 1/25/2023   Next office visit with prescribing clinician: 1/25/2023                         Would you like a call back once the refill request has been completed: [] Yes [] No    If the office needs to give you a call back, can they leave a voicemail: [] Yes [] No    Luann Farmer MA  01/18/23, 12:39 EST

## 2023-01-25 ENCOUNTER — TELEMEDICINE (OUTPATIENT)
Dept: BEHAVIORAL HEALTH | Facility: CLINIC | Age: 58
End: 2023-01-25
Payer: MEDICARE

## 2023-01-25 DIAGNOSIS — F51.04 PSYCHOPHYSIOLOGICAL INSOMNIA: ICD-10-CM

## 2023-01-25 DIAGNOSIS — F90.2 ATTENTION DEFICIT HYPERACTIVITY DISORDER, COMBINED TYPE: Primary | ICD-10-CM

## 2023-01-25 DIAGNOSIS — F31.81 BIPOLAR II DISORDER: ICD-10-CM

## 2023-01-25 DIAGNOSIS — F41.1 GENERALIZED ANXIETY DISORDER: ICD-10-CM

## 2023-01-25 PROCEDURE — 99214 OFFICE O/P EST MOD 30 MIN: CPT

## 2023-01-25 RX ORDER — CITALOPRAM 20 MG/1
20 TABLET ORAL DAILY
Qty: 90 TABLET | Refills: 0 | Status: SHIPPED | OUTPATIENT
Start: 2023-01-25 | End: 2023-03-15 | Stop reason: SDUPTHER

## 2023-01-25 RX ORDER — TOPIRAMATE 100 MG/1
100 TABLET, FILM COATED ORAL 2 TIMES DAILY
Qty: 180 TABLET | Refills: 0 | Status: SHIPPED | OUTPATIENT
Start: 2023-01-25

## 2023-01-25 RX ORDER — BUPROPION HYDROCHLORIDE 150 MG/1
150 TABLET ORAL EVERY MORNING
Qty: 90 TABLET | Refills: 0 | Status: SHIPPED | OUTPATIENT
Start: 2023-01-25 | End: 2023-03-15 | Stop reason: SDUPTHER

## 2023-01-25 RX ORDER — LAMOTRIGINE 200 MG/1
200 TABLET ORAL 2 TIMES DAILY
Qty: 180 TABLET | Refills: 0 | Status: SHIPPED | OUTPATIENT
Start: 2023-01-25 | End: 2023-03-15 | Stop reason: SDUPTHER

## 2023-01-25 NOTE — PROGRESS NOTES
This provider is located at [facility], Behavioral Health ,[address], [address] in [place], [place],using a secure MyChart Video Visit through [employer]. Patient is being seen remotely via telehealth at their home address in [place], and stated they are in a secure environment for this session. The patient's condition being diagnosed/treated is appropriate for telemedicine. The provider identified herself as well as her credentials.   The patient, and/or patients guardian, consent to be seen remotely, and when consent is given they understand that the consent allows for patient identifiable information to be sent to a third party as needed.   They may refuse to be seen remotely at any time. The electronic data is encrypted and password protected, and the patient and/or guardian has been advised of the potential risks to privacy not withstanding such measures.    Video Visit    Patient Name: Eleanor Preston  : 1965   MRN: [id number]   Care Team: Patient Care Team:  Dewey Johnson MD as PCP - General (Family Medicine)  Lia Marroquin APRN as Nurse Practitioner (Behavioral Health)        Chief Complaint:    Chief Complaint   Patient presents with   • ADHD   • Anxiety   • Sleeping Problem   • Bipolar   • Med Management       History of Present Illness: Eleanor Preston is a 57 y.o. female who presents via video visit for a medication management follow up. Patient states that adding the Wellbutrin has been very helpful. She does state that she wants to come off the Vraylar as the weight gain side effect is too much. She still struggles with getting going in the morning, but does endorse a rather odd sleep schedule. She is still able to get all of her daily tasks completed and the Adderall continues to help with focus and task completion. Patient also discussed wanting to get set up with therapy as she feels it will be beneficial to work through some passed trauma.      Subjective   Review of Systems:    Review of Systems   Psychiatric/Behavioral: Positive for dysphoric mood, sleep disturbance and stress. The patient is nervous/anxious.    All other systems reviewed and are negative.      Current Medications:   Current Outpatient Medications   Medication Sig Dispense Refill   • buPROPion XL (WELLBUTRIN XL) 150 MG 24 hr tablet Take 1 tablet by mouth Every Morning. 90 tablet 0   • citalopram (CeleXA) 20 MG tablet Take 1 tablet by mouth Daily. 90 tablet 0   • hydrOXYzine (ATARAX) 50 MG tablet TAKE 1 TABLET BY MOUTH ONCE NIGHTLY 30 tablet 1   • lamoTRIgine (LaMICtal) 200 MG tablet Take 1 tablet by mouth 2 (Two) Times a Day. 180 tablet 0   • topiramate (TOPAMAX) 100 MG tablet Take 1 tablet by mouth 2 (Two) Times a Day. 180 tablet 0   • zolpidem (AMBIEN) 10 MG tablet TAKE 1 TABLET BY MOUTH AT NIGHT AS NEEDED FOR SLEEP 30 tablet 0   • amphetamine-dextroamphetamine (Adderall) 30 MG tablet Take 1 tablet by mouth 2 (Two) Times a Day. 60 tablet 0   • estradiol (ESTRACE) 2 MG tablet      • multivitamin with minerals tablet tablet        No current facility-administered medications for this visit.       Mental Status Exam:   Hygiene:   unable to assess  Cooperation:  Cooperative  Eye Contact:  Good  Psychomotor Behavior:  unable to assess  Affect:  Appropriate  Mood: normal  Speech:  Normal  Thought Process:  Goal directed and Linear  Thought Content:  Normal and Mood congruent  Suicidal:  None  Homicidal:  None  Hallucinations:  None  Delusion:  None  Memory:  Intact  Orientation:  Person, Place, Time and Situation  Reliability:  good  Insight:  Good  Judgement:  Good  Impulse Control:  Good  Physical/Medical Issues:  No      Objective   Vital Signs:   There were no vitals taken for this visit.      Assessment / Plan    Diagnoses and all orders for this visit:    1. Attention deficit hyperactivity disorder, combined type (Primary)   - Continue Adderall 30 mg  2. Bipolar II disorder  (HCC)  -     topiramate (TOPAMAX) 100 MG tablet; Take 1 tablet by mouth 2 (Two) Times a Day.  Dispense: 180 tablet; Refill: 0  -     lamoTRIgine (LaMICtal) 200 MG tablet; Take 1 tablet by mouth 2 (Two) Times a Day.  Dispense: 180 tablet; Refill: 0  -     citalopram (CeleXA) 20 MG tablet; Take 1 tablet by mouth Daily.  Dispense: 90 tablet; Refill: 0  -     buPROPion XL (WELLBUTRIN XL) 150 MG 24 hr tablet; Take 1 tablet by mouth Every Morning.  Dispense: 90 tablet; Refill: 0    3. Generalized anxiety disorder  -     topiramate (TOPAMAX) 100 MG tablet; Take 1 tablet by mouth 2 (Two) Times a Day.  Dispense: 180 tablet; Refill: 0  -     citalopram (CeleXA) 20 MG tablet; Take 1 tablet by mouth Daily.  Dispense: 90 tablet; Refill: 0  -     buPROPion XL (WELLBUTRIN XL) 150 MG 24 hr tablet; Take 1 tablet by mouth Every Morning.  Dispense: 90 tablet; Refill: 0    4. Psychophysiological insomnia   - Continue Ambien 10 mg      Will wean off Vraylar. Patient will take every other day for two weeks then stop if tolerating well. Continue all other medications as ordered. Sent message to schedule for therapy.       A psychological evaluation was conducted in order to assess past and current level of functioning. Areas assessed included, but were not limited to: perception of social support, perception of ability to face and deal with challenges in life (positive functioning), anxiety symptoms, depressive symptoms, perspective on beliefs/belief system, coping skills for stress, intelligence level,  Therapeutic rapport was established. Interventions conducted today were geared towards incorporating medication management along with support for continued therapy. Education was also provided as to the med management with this provider and what to expect in subsequent sessions.      We discussed risks, benefits, goals and side effects of the above medication and the patient was agreeable with the plan. Patient was educated on the  importance of compliance with treatment and follow-up appointments. Patient is aware to contact the [facility] with any worsening of symptoms. To call for questions or concerns and return early if necessary. Patent is agreeable to go to the Emergency Department or call 911 should they begin SI/HI.      MEDS ORDERED DURING VISIT:  New Medications Ordered This Visit   Medications   • topiramate (TOPAMAX) 100 MG tablet     Sig: Take 1 tablet by mouth 2 (Two) Times a Day.     Dispense:  180 tablet     Refill:  0   • lamoTRIgine (LaMICtal) 200 MG tablet     Sig: Take 1 tablet by mouth 2 (Two) Times a Day.     Dispense:  180 tablet     Refill:  0   • citalopram (CeleXA) 20 MG tablet     Sig: Take 1 tablet by mouth Daily.     Dispense:  90 tablet     Refill:  0   • buPROPion XL (WELLBUTRIN XL) 150 MG 24 hr tablet     Sig: Take 1 tablet by mouth Every Morning.     Dispense:  90 tablet     Refill:  0         Follow Up   Return in about 8 weeks (around 3/22/2023).  Patient was given instructions and counseling regarding her condition or for health maintenance advice. Please see specific information pulled into the AVS if appropriate.     TREATMENT PLAN/GOALS: Continue supportive psychotherapy efforts and medications as indicated. Treatment and medication options discussed during today's visit. Patient acknowledged and verbally consented to continue with current treatment plan and was educated on the importance of compliance with treatment and follow-up appointments.    MEDICATION ISSUES:  Discussed medication options and treatment plan of prescribed medication as well as the risks, benefits, and side effects including potential falls, possible impaired driving and metabolic adversities among others. Patient is agreeable to call the office with any worsening of symptoms or onset of side effects. Patient is agreeable to call 911 or go to the nearest ER should he/she begin having SI/HI.      DEEPA WebsterE  PC [facility] [address] [place] [address]  [phone]    January 27, 2023 20:22 EST

## 2023-02-04 DIAGNOSIS — F31.81 BIPOLAR II DISORDER: ICD-10-CM

## 2023-02-06 RX ORDER — LAMOTRIGINE 200 MG/1
TABLET ORAL
Qty: 180 TABLET | Refills: 0 | OUTPATIENT
Start: 2023-02-06

## 2023-02-09 DIAGNOSIS — F31.81 BIPOLAR II DISORDER: ICD-10-CM

## 2023-02-09 DIAGNOSIS — F41.1 GENERALIZED ANXIETY DISORDER: ICD-10-CM

## 2023-02-09 DIAGNOSIS — F90.2 ATTENTION DEFICIT HYPERACTIVITY DISORDER, COMBINED TYPE: ICD-10-CM

## 2023-02-09 RX ORDER — DEXTROAMPHETAMINE SACCHARATE, AMPHETAMINE ASPARTATE, DEXTROAMPHETAMINE SULFATE AND AMPHETAMINE SULFATE 7.5; 7.5; 7.5; 7.5 MG/1; MG/1; MG/1; MG/1
30 TABLET ORAL 2 TIMES DAILY
Qty: 60 TABLET | Refills: 0 | Status: SHIPPED | OUTPATIENT
Start: 2023-02-09 | End: 2023-02-14

## 2023-02-09 RX ORDER — TOPIRAMATE 100 MG/1
100 TABLET, FILM COATED ORAL 2 TIMES DAILY
Qty: 180 TABLET | Refills: 0 | OUTPATIENT
Start: 2023-02-09

## 2023-02-09 NOTE — TELEPHONE ENCOUNTER
PATIENT REQUESTED MED REFILL FOR ADDERRAL ON MYCHART AND SHE ACCIDENTALLY PRESSED TOPAMAX TO BUT SHE DOES NOT NEED THE TOPOMAX JUST THE ADDERRAL

## 2023-02-09 NOTE — TELEPHONE ENCOUNTER
Rx Refill Note  Requested Prescriptions     Pending Prescriptions Disp Refills   • amphetamine-dextroamphetamine (Adderall) 30 MG tablet 60 tablet 0     Sig: Take 1 tablet by mouth 2 (Two) Times a Day.   • topiramate (TOPAMAX) 100 MG tablet 180 tablet 0     Sig: Take 1 tablet by mouth 2 (Two) Times a Day.      Last office visit with prescribing clinician: 10/26/2022   Last telemedicine visit with prescribing clinician: Visit date not found   Next office visit with prescribing clinician: 3/23/2023                         Would you like a call back once the refill request has been completed: [] Yes [] No    If the office needs to give you a call back, can they leave a voicemail: [] Yes [] No    Yanet Huntley LPN  02/09/23, 12:50 EST

## 2023-02-13 ENCOUNTER — TELEPHONE (OUTPATIENT)
Dept: BEHAVIORAL HEALTH | Facility: CLINIC | Age: 58
End: 2023-02-13
Payer: MEDICARE

## 2023-02-13 NOTE — TELEPHONE ENCOUNTER
PATIENT CALLED WANTING TO SPEAK WITH NURSE ABOUT HER MEDICATIONS. SHE SAYS SHE HAS HAD A BAD COUPLE OF WEEKS AND SEEMS MORE DEPRESSED FEELING AND NO ENERGY TO WANT TO DO ANYTHING

## 2023-02-14 ENCOUNTER — TELEMEDICINE (OUTPATIENT)
Dept: BEHAVIORAL HEALTH | Facility: CLINIC | Age: 58
End: 2023-02-14
Payer: MEDICARE

## 2023-02-14 DIAGNOSIS — F41.1 GENERALIZED ANXIETY DISORDER: ICD-10-CM

## 2023-02-14 DIAGNOSIS — F90.2 ATTENTION DEFICIT HYPERACTIVITY DISORDER, COMBINED TYPE: Primary | ICD-10-CM

## 2023-02-14 DIAGNOSIS — F31.81 BIPOLAR II DISORDER: ICD-10-CM

## 2023-02-14 DIAGNOSIS — F51.04 PSYCHOPHYSIOLOGICAL INSOMNIA: ICD-10-CM

## 2023-02-14 PROCEDURE — 99214 OFFICE O/P EST MOD 30 MIN: CPT

## 2023-02-14 RX ORDER — METHYLPHENIDATE HYDROCHLORIDE 40 MG/1
40 CAPSULE ORAL NIGHTLY
Qty: 30 CAPSULE | Refills: 0 | Status: SHIPPED | OUTPATIENT
Start: 2023-02-14 | End: 2023-02-23

## 2023-02-14 RX ORDER — HYDROXYZINE 50 MG/1
50 TABLET, FILM COATED ORAL NIGHTLY
Qty: 30 TABLET | Refills: 1 | Status: SHIPPED | OUTPATIENT
Start: 2023-02-14 | End: 2023-03-15 | Stop reason: SDUPTHER

## 2023-02-14 RX ORDER — BUSPIRONE HYDROCHLORIDE 5 MG/1
5 TABLET ORAL 3 TIMES DAILY
Qty: 90 TABLET | Refills: 1 | Status: SHIPPED | OUTPATIENT
Start: 2023-02-14 | End: 2023-03-15 | Stop reason: SDUPTHER

## 2023-02-14 RX ORDER — ZOLPIDEM TARTRATE 10 MG/1
10 TABLET ORAL NIGHTLY PRN
Qty: 30 TABLET | Refills: 0 | Status: SHIPPED | OUTPATIENT
Start: 2023-02-14 | End: 2023-03-15 | Stop reason: SDUPTHER

## 2023-02-14 NOTE — PROGRESS NOTES
This provider is located at [facility], Behavioral Health ,[address], [address] in [place], [place],using a secure MyChart Video Visit through [employer]. Patient is being seen remotely via telehealth at their home address in [place], and stated they are in a secure environment for this session. The patient's condition being diagnosed/treated is appropriate for telemedicine. The provider identified herself as well as her credentials.   The patient, and/or patients guardian, consent to be seen remotely, and when consent is given they understand that the consent allows for patient identifiable information to be sent to a third party as needed.   They may refuse to be seen remotely at any time. The electronic data is encrypted and password protected, and the patient and/or guardian has been advised of the potential risks to privacy not withstanding such measures.    Video Visit    Patient Name: Eleanor Preston  : 1965   MRN: [id number]   Care Team: Patient Care Team:  Dewey Johnson MD as PCP - General (Family Medicine)  Lia Marroquin APRN as Nurse Practitioner (Behavioral Health)        Chief Complaint:    Chief Complaint   Patient presents with   • ADHD   • Anxiety   • Manic Behavior   • Sleeping Problem   • Med Management       History of Present Illness: Eleanor Preston is a 58 y.o. female who presents via video visit for a medication management follow up. Patient reports continuing to struggle with motivation. She feels that she has no desire to get up and get going. Her mood is poor but not as bad as it once as. She feels that she takes her Adderall and it does nothing to help her. Along with her mood being poor she feels that her anxiety has increasingly gotten worse. She is constantly worrying most of the time. She does state that she is looking forward to starting therapy in April.     Subjective   Review of Systems:    Review of Systems    Psychiatric/Behavioral: Positive for decreased concentration, sleep disturbance, depressed mood and stress. The patient is nervous/anxious.    All other systems reviewed and are negative.      Current Medications:   Current Outpatient Medications   Medication Sig Dispense Refill   • hydrOXYzine (ATARAX) 50 MG tablet Take 1 tablet by mouth Every Night. 30 tablet 1   • zolpidem (AMBIEN) 10 MG tablet Take 1 tablet by mouth At Night As Needed for Sleep. 30 tablet 0   • buPROPion XL (WELLBUTRIN XL) 150 MG 24 hr tablet Take 1 tablet by mouth Every Morning. 90 tablet 0   • busPIRone (BUSPAR) 5 MG tablet Take 1 tablet by mouth 3 (Three) Times a Day. 90 tablet 1   • citalopram (CeleXA) 20 MG tablet Take 1 tablet by mouth Daily. 90 tablet 0   • estradiol (ESTRACE) 2 MG tablet      • lamoTRIgine (LaMICtal) 200 MG tablet Take 1 tablet by mouth 2 (Two) Times a Day. 180 tablet 0   • Methylphenidate HCl ER, PM, (Jornay PM) 40 MG capsule sustained-release 24 hr Take 40 mg by mouth Every Night. 30 capsule 0   • multivitamin with minerals tablet tablet      • topiramate (TOPAMAX) 100 MG tablet Take 1 tablet by mouth 2 (Two) Times a Day. 180 tablet 0     No current facility-administered medications for this visit.       Mental Status Exam:   Hygiene:   unable to assess  Cooperation:  Cooperative  Eye Contact:  Good  Psychomotor Behavior:  unable to assess  Affect:  Appropriate  Mood: sad, depressed and anxious  Speech:  Normal  Thought Process:  Goal directed and Linear  Thought Content:  Normal and Mood congruent  Suicidal:  None  Homicidal:  None  Hallucinations:  None  Delusion:  None  Memory:  Intact  Orientation:  Person, Place, Time and Situation  Reliability:  good  Insight:  Good  Judgement:  Good  Impulse Control:  Good  Physical/Medical Issues:  No      Objective   Vital Signs:   There were no vitals taken for this visit.      Assessment / Plan    Diagnoses and all orders for this visit:    1. Attention deficit  hyperactivity disorder, combined type (Primary)  -     Methylphenidate HCl ER, PM, (Jornay PM) 40 MG capsule sustained-release 24 hr; Take 40 mg by mouth Every Night.  Dispense: 30 capsule; Refill: 0    2. Bipolar II disorder (HCC)  -     busPIRone (BUSPAR) 5 MG tablet; Take 1 tablet by mouth 3 (Three) Times a Day.  Dispense: 90 tablet; Refill: 1    3. Generalized anxiety disorder  -     busPIRone (BUSPAR) 5 MG tablet; Take 1 tablet by mouth 3 (Three) Times a Day.  Dispense: 90 tablet; Refill: 1  -     hydrOXYzine (ATARAX) 50 MG tablet; Take 1 tablet by mouth Every Night.  Dispense: 30 tablet; Refill: 1    4. Psychophysiological insomnia  -     hydrOXYzine (ATARAX) 50 MG tablet; Take 1 tablet by mouth Every Night.  Dispense: 30 tablet; Refill: 1  -     zolpidem (AMBIEN) 10 MG tablet; Take 1 tablet by mouth At Night As Needed for Sleep.  Dispense: 30 tablet; Refill: 0      Will discontinue Adderall and switch to Jornay. Will add Buspar. Continue all other medicaiton as ordered.     A psychological evaluation was conducted in order to assess past and current level of functioning. Areas assessed included, but were not limited to: perception of social support, perception of ability to face and deal with challenges in life (positive functioning), anxiety symptoms, depressive symptoms, perspective on beliefs/belief system, coping skills for stress, intelligence level,  Therapeutic rapport was established. Interventions conducted today were geared towards incorporating medication management along with support for continued therapy. Education was also provided as to the med management with this provider and what to expect in subsequent sessions.      We discussed risks, benefits, goals and side effects of the above medication and the patient was agreeable with the plan. Patient was educated on the importance of compliance with treatment and follow-up appointments. Patient is aware to contact the [facility] with any  worsening of symptoms. To call for questions or concerns and return early if necessary. Patent is agreeable to go to the Emergency Department or call 911 should they begin SI/HI.        MEDS ORDERED DURING VISIT:  New Medications Ordered This Visit   Medications   • busPIRone (BUSPAR) 5 MG tablet     Sig: Take 1 tablet by mouth 3 (Three) Times a Day.     Dispense:  90 tablet     Refill:  1   • hydrOXYzine (ATARAX) 50 MG tablet     Sig: Take 1 tablet by mouth Every Night.     Dispense:  30 tablet     Refill:  1   • Methylphenidate HCl ER, PM, (Jornay PM) 40 MG capsule sustained-release 24 hr     Sig: Take 40 mg by mouth Every Night.     Dispense:  30 capsule     Refill:  0   • zolpidem (AMBIEN) 10 MG tablet     Sig: Take 1 tablet by mouth At Night As Needed for Sleep.     Dispense:  30 tablet     Refill:  0         Follow Up   Return in about 4 weeks (around 3/14/2023).  Patient was given instructions and counseling regarding her condition or for health maintenance advice. Please see specific information pulled into the AVS if appropriate.     TREATMENT PLAN/GOALS: Continue supportive psychotherapy efforts and medications as indicated. Treatment and medication options discussed during today's visit. Patient acknowledged and verbally consented to continue with current treatment plan and was educated on the importance of compliance with treatment and follow-up appointments.    MEDICATION ISSUES:  Discussed medication options and treatment plan of prescribed medication as well as the risks, benefits, and side effects including potential falls, possible impaired driving and metabolic adversities among others. Patient is agreeable to call the office with any worsening of symptoms or onset of side effects. Patient is agreeable to call 911 or go to the nearest ER should he/she begin having SI/HI.        DEEPA Webster PC [facility] [address]  [address] [address]  [phone]    February 14, 2023 11:29 EST

## 2023-02-15 ENCOUNTER — TELEPHONE (OUTPATIENT)
Dept: BEHAVIORAL HEALTH | Facility: CLINIC | Age: 58
End: 2023-02-15
Payer: MEDICARE

## 2023-02-16 ENCOUNTER — PRIOR AUTHORIZATION (OUTPATIENT)
Dept: BEHAVIORAL HEALTH | Facility: CLINIC | Age: 58
End: 2023-02-16
Payer: MEDICARE

## 2023-02-16 NOTE — TELEPHONE ENCOUNTER
Sent to Plan today       Drug  Jornay PM 40MG er capsules         Key: [id number] - PA Case ID: [id number]

## 2023-02-20 NOTE — TELEPHONE ENCOUNTER
Approved on February 16  PA Case: [id number],   Status: Approved,   Coverage Starts on: 1/1/2023 12:00:00 AM, Coverage Ends on: 12/31/2023 12:00:00 AM. Questions? Contact [phone].

## 2023-02-22 ENCOUNTER — TELEPHONE (OUTPATIENT)
Dept: INTERNAL MEDICINE | Facility: CLINIC | Age: 58
End: 2023-02-22
Payer: MEDICARE

## 2023-02-22 NOTE — TELEPHONE ENCOUNTER
Patient states she was unable to stay on the increased Wellbutrin she had jitters and had to drop back down.  But she finds herself still struggling and feeling overwhelmed and not sure what to do.  Patient states she is having a real hard time right now.  Please advise.  Phone number verified.

## 2023-02-23 DIAGNOSIS — F90.2 ATTENTION DEFICIT HYPERACTIVITY DISORDER, COMBINED TYPE: Primary | ICD-10-CM

## 2023-02-24 DIAGNOSIS — F90.2 ATTENTION DEFICIT HYPERACTIVITY DISORDER, COMBINED TYPE: ICD-10-CM

## 2023-02-24 NOTE — TELEPHONE ENCOUNTER
Patient states that she is going back to [place] and asked if the Vyvanse could be sent to her pharmacy there instead of the [facility] [place].

## 2023-02-27 ENCOUNTER — TELEPHONE (OUTPATIENT)
Dept: FAMILY MEDICINE CLINIC | Facility: CLINIC | Age: 58
End: 2023-02-27
Payer: MEDICARE

## 2023-02-27 DIAGNOSIS — F90.2 ATTENTION DEFICIT HYPERACTIVITY DISORDER, COMBINED TYPE: Primary | ICD-10-CM

## 2023-02-27 DIAGNOSIS — F90.2 ATTENTION DEFICIT HYPERACTIVITY DISORDER, COMBINED TYPE: ICD-10-CM

## 2023-02-27 NOTE — TELEPHONE ENCOUNTER
Patient says she was visiting in [place], but is now back home. She says her Vyvanse needed a PA, so she has not been able to pick it up. She asked if it can be resent to [facility] in [place].

## 2023-02-28 ENCOUNTER — PRIOR AUTHORIZATION (OUTPATIENT)
Dept: FAMILY MEDICINE CLINIC | Facility: CLINIC | Age: 58
End: 2023-02-28
Payer: MEDICARE

## 2023-02-28 NOTE — TELEPHONE ENCOUNTER
A PRIOR AUTH HAS BEEN STARTED THROUGH [employer] FOR VYVANSE 20 MG.    WAITING ON A RESPONSE FROM THE INSURANCE.    Key: [id number]      APPROVED.      START: 01/01/2023      END:  12/31/2023    PHARMACY HAS BEEN NOTIFIED.    PATIENT HAS BEEN NOTIFIED VIA [email or web address].

## 2023-03-15 ENCOUNTER — TELEPHONE (OUTPATIENT)
Dept: BEHAVIORAL HEALTH | Facility: CLINIC | Age: 58
End: 2023-03-15

## 2023-03-15 ENCOUNTER — TELEMEDICINE (OUTPATIENT)
Dept: BEHAVIORAL HEALTH | Facility: CLINIC | Age: 58
End: 2023-03-15
Payer: MEDICARE

## 2023-03-15 DIAGNOSIS — F90.2 ATTENTION DEFICIT HYPERACTIVITY DISORDER, COMBINED TYPE: Primary | ICD-10-CM

## 2023-03-15 DIAGNOSIS — F51.04 PSYCHOPHYSIOLOGICAL INSOMNIA: ICD-10-CM

## 2023-03-15 DIAGNOSIS — F31.81 BIPOLAR II DISORDER: ICD-10-CM

## 2023-03-15 DIAGNOSIS — F41.1 GENERALIZED ANXIETY DISORDER: ICD-10-CM

## 2023-03-15 PROCEDURE — 1160F RVW MEDS BY RX/DR IN RCRD: CPT

## 2023-03-15 PROCEDURE — 99214 OFFICE O/P EST MOD 30 MIN: CPT

## 2023-03-15 PROCEDURE — 1159F MED LIST DOCD IN RCRD: CPT

## 2023-03-15 RX ORDER — BUSPIRONE HYDROCHLORIDE 5 MG/1
5 TABLET ORAL 3 TIMES DAILY
Qty: 90 TABLET | Refills: 1 | Status: SHIPPED | OUTPATIENT
Start: 2023-03-15 | End: 2023-03-15 | Stop reason: SDUPTHER

## 2023-03-15 RX ORDER — LAMOTRIGINE 200 MG/1
200 TABLET ORAL 2 TIMES DAILY
Qty: 180 TABLET | Refills: 0 | Status: SHIPPED | OUTPATIENT
Start: 2023-03-15

## 2023-03-15 RX ORDER — LAMOTRIGINE 200 MG/1
200 TABLET ORAL 2 TIMES DAILY
Qty: 180 TABLET | Refills: 0 | Status: SHIPPED | OUTPATIENT
Start: 2023-03-15 | End: 2023-03-15 | Stop reason: SDUPTHER

## 2023-03-15 RX ORDER — ZOLPIDEM TARTRATE 10 MG/1
10 TABLET ORAL NIGHTLY PRN
Qty: 30 TABLET | Refills: 1 | Status: SHIPPED | OUTPATIENT
Start: 2023-03-15

## 2023-03-15 RX ORDER — HYDROXYZINE 50 MG/1
50 TABLET, FILM COATED ORAL NIGHTLY
Qty: 30 TABLET | Refills: 1 | Status: SHIPPED | OUTPATIENT
Start: 2023-03-15 | End: 2023-03-15 | Stop reason: SDUPTHER

## 2023-03-15 RX ORDER — CITALOPRAM 20 MG/1
20 TABLET ORAL DAILY
Qty: 90 TABLET | Refills: 0 | Status: SHIPPED | OUTPATIENT
Start: 2023-03-15 | End: 2023-03-15 | Stop reason: SDUPTHER

## 2023-03-15 RX ORDER — BUPROPION HYDROCHLORIDE 150 MG/1
150 TABLET ORAL EVERY MORNING
Qty: 90 TABLET | Refills: 1 | Status: SHIPPED | OUTPATIENT
Start: 2023-03-15 | End: 2023-03-15 | Stop reason: SDUPTHER

## 2023-03-15 RX ORDER — CITALOPRAM 20 MG/1
20 TABLET ORAL DAILY
Qty: 90 TABLET | Refills: 0 | Status: SHIPPED | OUTPATIENT
Start: 2023-03-15

## 2023-03-15 RX ORDER — BUSPIRONE HYDROCHLORIDE 5 MG/1
5 TABLET ORAL 3 TIMES DAILY
Qty: 270 TABLET | Refills: 0 | Status: SHIPPED | OUTPATIENT
Start: 2023-03-15

## 2023-03-15 RX ORDER — BUPROPION HYDROCHLORIDE 150 MG/1
150 TABLET ORAL EVERY MORNING
Qty: 90 TABLET | Refills: 1 | Status: SHIPPED | OUTPATIENT
Start: 2023-03-15

## 2023-03-15 RX ORDER — HYDROXYZINE 50 MG/1
50 TABLET, FILM COATED ORAL NIGHTLY
Qty: 90 TABLET | Refills: 0 | Status: SHIPPED | OUTPATIENT
Start: 2023-03-15

## 2023-03-15 NOTE — PROGRESS NOTES
This provider is located at [facility], Behavioral Health ,[address], [address] in [place], [place],using a secure MyChart Video Visit through [employer]. Patient is being seen remotely via telehealth at their home address in [place], and stated they are in a secure environment for this session. The patient's condition being diagnosed/treated is appropriate for telemedicine. The provider identified herself as well as her credentials.   The patient, and/or patients guardian, consent to be seen remotely, and when consent is given they understand that the consent allows for patient identifiable information to be sent to a third party as needed.   They may refuse to be seen remotely at any time. The electronic data is encrypted and password protected, and the patient and/or guardian has been advised of the potential risks to privacy not withstanding such measures.    Video Visit    Patient Name: Eleanor Preston  : 1965   MRN: [id number]   Care Team: Patient Care Team:  Dewey Johnson MD as PCP - General (Family Medicine)  Lia Marroquin APRN as Nurse Practitioner (Behavioral Health)        Chief Complaint:    Chief Complaint   Patient presents with   • ADHD   • Anxiety   • Sleeping Problem   • Bipolar   • Med Management       History of Present Illness: Eleanor Preston is a 58 y.o. female who presents via video visit for a medication management follow up. Patient reports that she finally feels that we got her medication adjusted appropriately. She states she is feeling much better and her mood has improved significantly. They Vyvanse helps with her focus and task completion well. She states she has been more motivated lately and has even joined a gym which she is looking forward to. Patient did not see the need to make any changes and did not have any medication concerns at today's visit.     The following portion of the patient's history were reviewed and updated  appropriately: allergies, current and past medications, family history, medical history and social history.  Subjective   Review of Systems:    Review of Systems   All other systems reviewed and are negative.      Current Medications:   Current Outpatient Medications   Medication Sig Dispense Refill   • buPROPion XL (WELLBUTRIN XL) 150 MG 24 hr tablet Take 1 tablet by mouth Every Morning. 90 tablet 1   • busPIRone (BUSPAR) 5 MG tablet Take 1 tablet by mouth 3 (Three) Times a Day. 90 tablet 1   • citalopram (CeleXA) 20 MG tablet Take 1 tablet by mouth Daily. 90 tablet 0   • hydrOXYzine (ATARAX) 50 MG tablet Take 1 tablet by mouth Every Night. 30 tablet 1   • lamoTRIgine (LaMICtal) 200 MG tablet Take 1 tablet by mouth 2 (Two) Times a Day. 180 tablet 0   • lisdexamfetamine (Vyvanse) 20 MG capsule Take 2 capsules by mouth Every Morning 60 capsule 0   • zolpidem (AMBIEN) 10 MG tablet Take 1 tablet by mouth At Night As Needed for Sleep. 30 tablet 1   • estradiol (ESTRACE) 2 MG tablet      • multivitamin with minerals tablet tablet      • topiramate (TOPAMAX) 100 MG tablet Take 1 tablet by mouth 2 (Two) Times a Day. 180 tablet 0     No current facility-administered medications for this visit.       Mental Status Exam:   Hygiene:   unable to assess  Cooperation:  Cooperative  Eye Contact:  Good  Psychomotor Behavior:  unable to assess  Affect:  Appropriate  Mood: normal  Speech:  Normal  Thought Process:  Goal directed and Linear  Thought Content:  Normal and Mood congruent  Suicidal:  None  Homicidal:  None  Hallucinations:  None  Delusion:  None  Memory:  Intact  Orientation:  Person, Place, Time and Situation  Reliability:  good  Insight:  Good  Judgement:  Good  Impulse Control:  Good  Physical/Medical Issues:  No      Objective   Vital Signs:   There were no vitals taken for this visit.      Assessment / Plan    Diagnoses and all orders for this visit:    1. Attention deficit hyperactivity disorder, combined type  (Primary)  -     lisdexamfetamine (Vyvanse) 20 MG capsule; Take 2 capsules by mouth Every Morning  Dispense: 60 capsule; Refill: 0    2. Bipolar II disorder (HCC)  -     buPROPion XL (WELLBUTRIN XL) 150 MG 24 hr tablet; Take 1 tablet by mouth Every Morning.  Dispense: 90 tablet; Refill: 1  -     busPIRone (BUSPAR) 5 MG tablet; Take 1 tablet by mouth 3 (Three) Times a Day.  Dispense: 90 tablet; Refill: 1  -     citalopram (CeleXA) 20 MG tablet; Take 1 tablet by mouth Daily.  Dispense: 90 tablet; Refill: 0  -     lamoTRIgine (LaMICtal) 200 MG tablet; Take 1 tablet by mouth 2 (Two) Times a Day.  Dispense: 180 tablet; Refill: 0    3. Generalized anxiety disorder  -     buPROPion XL (WELLBUTRIN XL) 150 MG 24 hr tablet; Take 1 tablet by mouth Every Morning.  Dispense: 90 tablet; Refill: 1  -     busPIRone (BUSPAR) 5 MG tablet; Take 1 tablet by mouth 3 (Three) Times a Day.  Dispense: 90 tablet; Refill: 1  -     citalopram (CeleXA) 20 MG tablet; Take 1 tablet by mouth Daily.  Dispense: 90 tablet; Refill: 0  -     hydrOXYzine (ATARAX) 50 MG tablet; Take 1 tablet by mouth Every Night.  Dispense: 30 tablet; Refill: 1    4. Psychophysiological insomnia  -     hydrOXYzine (ATARAX) 50 MG tablet; Take 1 tablet by mouth Every Night.  Dispense: 30 tablet; Refill: 1  -     zolpidem (AMBIEN) 10 MG tablet; Take 1 tablet by mouth At Night As Needed for Sleep.  Dispense: 30 tablet; Refill: 1      Patient appears to be doing well on current medication. No issues reported and no indication for change. Will continue medication as ordered.     A psychological evaluation was conducted in order to assess past and current level of functioning. Areas assessed included, but were not limited to: perception of social support, perception of ability to face and deal with challenges in life (positive functioning), anxiety symptoms, depressive symptoms, perspective on beliefs/belief system, coping skills for stress, intelligence level,  Therapeutic  rapport was established. Interventions conducted today were geared towards incorporating medication management along with support for continued therapy. Education was also provided as to the med management with this provider and what to expect in subsequent sessions.      We discussed risks, benefits, goals and side effects of the above medication and the patient was agreeable with the plan. Patient was educated on the importance of compliance with treatment and follow-up appointments. Patient is aware to contact the [facility] with any worsening of symptoms. To call for questions or concerns and return early if necessary. Patent is agreeable to go to the Emergency Department or call 911 should they begin SI/HI.        MEDS ORDERED DURING VISIT:  New Medications Ordered This Visit   Medications   • buPROPion XL (WELLBUTRIN XL) 150 MG 24 hr tablet     Sig: Take 1 tablet by mouth Every Morning.     Dispense:  90 tablet     Refill:  1   • busPIRone (BUSPAR) 5 MG tablet     Sig: Take 1 tablet by mouth 3 (Three) Times a Day.     Dispense:  90 tablet     Refill:  1   • citalopram (CeleXA) 20 MG tablet     Sig: Take 1 tablet by mouth Daily.     Dispense:  90 tablet     Refill:  0   • hydrOXYzine (ATARAX) 50 MG tablet     Sig: Take 1 tablet by mouth Every Night.     Dispense:  30 tablet     Refill:  1   • lamoTRIgine (LaMICtal) 200 MG tablet     Sig: Take 1 tablet by mouth 2 (Two) Times a Day.     Dispense:  180 tablet     Refill:  0   • lisdexamfetamine (Vyvanse) 20 MG capsule     Sig: Take 2 capsules by mouth Every Morning     Dispense:  60 capsule     Refill:  0   • zolpidem (AMBIEN) 10 MG tablet     Sig: Take 1 tablet by mouth At Night As Needed for Sleep.     Dispense:  30 tablet     Refill:  1         Follow Up   Return in about 6 weeks (around 4/26/2023).  Patient was given instructions and counseling regarding her condition or for health maintenance advice. Please see specific information pulled into the AVS if  appropriate.     TREATMENT PLAN/GOALS: Continue supportive psychotherapy efforts and medications as indicated. Treatment and medication options discussed during today's visit. Patient acknowledged and verbally consented to continue with current treatment plan and was educated on the importance of compliance with treatment and follow-up appointments.    MEDICATION ISSUES:  Discussed medication options and treatment plan of prescribed medication as well as the risks, benefits, and side effects including potential falls, possible impaired driving and metabolic adversities among others. Patient is agreeable to call the office with any worsening of symptoms or onset of side effects. Patient is agreeable to call 911 or go to the nearest ER should he/she begin having SI/HI.        [facility] [address] [place] [address] [phone]    March 15, 2023 15:11 EDT

## 2023-03-15 NOTE — TELEPHONE ENCOUNTER
PATIENT WOULD LIKE ALL MEDICATIONS BESIDES THE VYVANSE AND AMBIAN SENT TO [facility]

## 2023-04-09 DIAGNOSIS — F31.81 BIPOLAR II DISORDER: ICD-10-CM

## 2023-04-10 RX ORDER — LAMOTRIGINE 200 MG/1
TABLET ORAL
Qty: 180 TABLET | Refills: 0 | Status: SHIPPED | OUTPATIENT
Start: 2023-04-10

## 2023-04-10 NOTE — TELEPHONE ENCOUNTER
Rx Refill Note  Requested Prescriptions     Pending Prescriptions Disp Refills   • lamoTRIgine (LaMICtal) 200 MG tablet [Pharmacy Med Name: LAMOTRIGINE 200 MG Tablet] 180 tablet 0     Sig: TAKE 1 TABLET TWICE DAILY      Last office visit with prescribing clinician: 10/26/2022   Last telemedicine visit with prescribing clinician: 4/26/2023   Next office visit with prescribing clinician: 4/26/2023                         Would you like a call back once the refill request has been completed: [] Yes [] No    If the office needs to give you a call back, can they leave a voicemail: [] Yes [] No    Rhonda Cuevas MA  04/10/23, 07:49 EDT

## 2023-04-26 ENCOUNTER — TELEMEDICINE (OUTPATIENT)
Dept: BEHAVIORAL HEALTH | Facility: CLINIC | Age: 58
End: 2023-04-26
Payer: MEDICARE

## 2023-04-26 DIAGNOSIS — F31.81 BIPOLAR II DISORDER: ICD-10-CM

## 2023-04-26 DIAGNOSIS — F41.1 GENERALIZED ANXIETY DISORDER: ICD-10-CM

## 2023-04-26 DIAGNOSIS — F90.2 ATTENTION DEFICIT HYPERACTIVITY DISORDER, COMBINED TYPE: Primary | ICD-10-CM

## 2023-04-26 DIAGNOSIS — F51.04 PSYCHOPHYSIOLOGICAL INSOMNIA: ICD-10-CM

## 2023-04-26 RX ORDER — CITALOPRAM 20 MG/1
20 TABLET ORAL DAILY
Qty: 90 TABLET | Refills: 0 | Status: SHIPPED | OUTPATIENT
Start: 2023-04-26

## 2023-04-26 RX ORDER — BUPROPION HYDROCHLORIDE 150 MG/1
150 TABLET ORAL EVERY MORNING
Qty: 90 TABLET | Refills: 0 | Status: SHIPPED | OUTPATIENT
Start: 2023-04-26

## 2023-04-26 RX ORDER — METHYLPHENIDATE HYDROCHLORIDE 27 MG/1
27 TABLET ORAL DAILY
Qty: 30 TABLET | Refills: 0 | Status: SHIPPED | OUTPATIENT
Start: 2023-04-26

## 2023-04-26 RX ORDER — LAMOTRIGINE 200 MG/1
200 TABLET ORAL 2 TIMES DAILY
Qty: 180 TABLET | Refills: 0 | Status: SHIPPED | OUTPATIENT
Start: 2023-04-26

## 2023-04-26 RX ORDER — ZOLPIDEM TARTRATE 5 MG/1
5 TABLET ORAL NIGHTLY PRN
Qty: 30 TABLET | Refills: 0 | Status: SHIPPED | OUTPATIENT
Start: 2023-04-26

## 2023-04-26 RX ORDER — HYDROXYZINE 50 MG/1
50 TABLET, FILM COATED ORAL NIGHTLY
Qty: 90 TABLET | Refills: 0 | Status: SHIPPED | OUTPATIENT
Start: 2023-04-26

## 2023-04-26 RX ORDER — TOPIRAMATE 100 MG/1
100 TABLET, FILM COATED ORAL 2 TIMES DAILY
Qty: 180 TABLET | Refills: 0 | Status: SHIPPED | OUTPATIENT
Start: 2023-04-26

## 2023-04-26 RX ORDER — BUSPIRONE HYDROCHLORIDE 5 MG/1
5 TABLET ORAL 3 TIMES DAILY
Qty: 270 TABLET | Refills: 0 | Status: SHIPPED | OUTPATIENT
Start: 2023-04-26

## 2023-04-26 NOTE — PROGRESS NOTES
This provider is located at [facility], Behavioral Health ,[address], [address] in [place], [place],using a secure MyChart Video Visit through [employer]. Patient is being seen remotely via telehealth at their home address in [place], and stated they are in a secure environment for this session. The patient's condition being diagnosed/treated is appropriate for telemedicine. The provider identified herself as well as her credentials.   The patient, and/or patients guardian, consent to be seen remotely, and when consent is given they understand that the consent allows for patient identifiable information to be sent to a third party as needed.   They may refuse to be seen remotely at any time. The electronic data is encrypted and password protected, and the patient and/or guardian has been advised of the potential risks to privacy not withstanding such measures.    Video Visit    Patient Name: Eleanor Preston  : 1965   MRN: [id number]   Care Team: Patient Care Team:  Dewey Johnson MD as PCP - General (Family Medicine)  Lia Marroquin APRN as Nurse Practitioner (Behavioral Health)        Chief Complaint:    Chief Complaint   Patient presents with   • Bipolar   • ADHD   • Anxiety   • Sleeping Problem   • Med Management       History of Present Illness: Eleanor Preston is a 58 y.o. female who presents via video visit for a medication management follow up. Patient reports that the Vyvanse was making her too tired so she decreased her dose and the tiredness went away but then she could no longer focus. She has also only been taking 5 mg of Ambien because she has been feeling like she didn't need the whole 10 mg and she has been sleeping well. She reports that overall her mood is in a great place and she is happy with it. She started going to the gym and working out and she feels that has helped tremendously. Her only complaint today is trying to figure out her ADHD  medication.     The following portion of the patient's history were reviewed and updated appropriately: allergies, current and past medications, family history, medical history and social history.  Subjective   Review of Systems:    Review of Systems   Psychiatric/Behavioral: Positive for decreased concentration.   All other systems reviewed and are negative.      Current Medications:   Current Outpatient Medications   Medication Sig Dispense Refill   • buPROPion XL (WELLBUTRIN XL) 150 MG 24 hr tablet Take 1 tablet by mouth Every Morning. 90 tablet 0   • busPIRone (BUSPAR) 5 MG tablet Take 1 tablet by mouth 3 (Three) Times a Day. 270 tablet 0   • citalopram (CeleXA) 20 MG tablet Take 1 tablet by mouth Daily. 90 tablet 0   • hydrOXYzine (ATARAX) 50 MG tablet Take 1 tablet by mouth Every Night. 90 tablet 0   • lamoTRIgine (LaMICtal) 200 MG tablet Take 1 tablet by mouth 2 (Two) Times a Day. 180 tablet 0   • topiramate (TOPAMAX) 100 MG tablet Take 1 tablet by mouth 2 (Two) Times a Day. 180 tablet 0   • estradiol (ESTRACE) 2 MG tablet      • methylphenidate (Concerta) 27 MG CR tablet Take 1 tablet by mouth Daily 30 tablet 0   • multivitamin with minerals tablet tablet      • zolpidem (Ambien) 5 MG tablet Take 1 tablet by mouth At Night As Needed for Sleep. 30 tablet 0     No current facility-administered medications for this visit.       Mental Status Exam:   Hygiene:   unable to assess  Cooperation:  Cooperative  Eye Contact:  Good  Psychomotor Behavior:  unable to assess  Affect:  Appropriate  Mood: normal  Speech:  Normal  Thought Process:  Goal directed and Linear  Thought Content:  Normal and Mood congruent  Suicidal:  None  Homicidal:  None  Hallucinations:  None  Delusion:  None  Memory:  Intact  Orientation:  Person, Place, Time and Situation  Reliability:  good  Insight:  Good  Judgement:  Good  Impulse Control:  Good  Physical/Medical Issues:  No      Objective   Vital Signs:   There were no vitals taken for  this visit.      Assessment / Plan    Diagnoses and all orders for this visit:    1. Attention deficit hyperactivity disorder, combined type (Primary)  -     methylphenidate (Concerta) 27 MG CR tablet; Take 1 tablet by mouth Daily  Dispense: 30 tablet; Refill: 0    2. Bipolar II disorder  -     buPROPion XL (WELLBUTRIN XL) 150 MG 24 hr tablet; Take 1 tablet by mouth Every Morning.  Dispense: 90 tablet; Refill: 0  -     busPIRone (BUSPAR) 5 MG tablet; Take 1 tablet by mouth 3 (Three) Times a Day.  Dispense: 270 tablet; Refill: 0  -     citalopram (CeleXA) 20 MG tablet; Take 1 tablet by mouth Daily.  Dispense: 90 tablet; Refill: 0  -     lamoTRIgine (LaMICtal) 200 MG tablet; Take 1 tablet by mouth 2 (Two) Times a Day.  Dispense: 180 tablet; Refill: 0  -     topiramate (TOPAMAX) 100 MG tablet; Take 1 tablet by mouth 2 (Two) Times a Day.  Dispense: 180 tablet; Refill: 0    3. Generalized anxiety disorder  -     buPROPion XL (WELLBUTRIN XL) 150 MG 24 hr tablet; Take 1 tablet by mouth Every Morning.  Dispense: 90 tablet; Refill: 0  -     busPIRone (BUSPAR) 5 MG tablet; Take 1 tablet by mouth 3 (Three) Times a Day.  Dispense: 270 tablet; Refill: 0  -     citalopram (CeleXA) 20 MG tablet; Take 1 tablet by mouth Daily.  Dispense: 90 tablet; Refill: 0  -     hydrOXYzine (ATARAX) 50 MG tablet; Take 1 tablet by mouth Every Night.  Dispense: 90 tablet; Refill: 0  -     topiramate (TOPAMAX) 100 MG tablet; Take 1 tablet by mouth 2 (Two) Times a Day.  Dispense: 180 tablet; Refill: 0    4. Psychophysiological insomnia  -     hydrOXYzine (ATARAX) 50 MG tablet; Take 1 tablet by mouth Every Night.  Dispense: 90 tablet; Refill: 0  -     zolpidem (Ambien) 5 MG tablet; Take 1 tablet by mouth At Night As Needed for Sleep.  Dispense: 30 tablet; Refill: 0    Will discontinue Vyvanse and try Concerta. Will decrease Ambien and continue all other medication as ordered.     A psychological evaluation was conducted in order to assess past and  current level of functioning. Areas assessed included, but were not limited to: perception of social support, perception of ability to face and deal with challenges in life (positive functioning), anxiety symptoms, depressive symptoms, perspective on beliefs/belief system, coping skills for stress, intelligence level,  Therapeutic rapport was established. Interventions conducted today were geared towards incorporating medication management along with support for continued therapy. Education was also provided as to the med management with this provider and what to expect in subsequent sessions.      We discussed risks, benefits, goals and side effects of the above medication and the patient was agreeable with the plan. Patient was educated on the importance of compliance with treatment and follow-up appointments. Patient is aware to contact the [facility] with any worsening of symptoms. To call for questions or concerns and return early if necessary. Patent is agreeable to go to the Emergency Department or call 911 should they begin SI/HI.        MEDS ORDERED DURING VISIT:  New Medications Ordered This Visit   Medications   • buPROPion XL (WELLBUTRIN XL) 150 MG 24 hr tablet     Sig: Take 1 tablet by mouth Every Morning.     Dispense:  90 tablet     Refill:  0   • busPIRone (BUSPAR) 5 MG tablet     Sig: Take 1 tablet by mouth 3 (Three) Times a Day.     Dispense:  270 tablet     Refill:  0   • citalopram (CeleXA) 20 MG tablet     Sig: Take 1 tablet by mouth Daily.     Dispense:  90 tablet     Refill:  0   • hydrOXYzine (ATARAX) 50 MG tablet     Sig: Take 1 tablet by mouth Every Night.     Dispense:  90 tablet     Refill:  0   • lamoTRIgine (LaMICtal) 200 MG tablet     Sig: Take 1 tablet by mouth 2 (Two) Times a Day.     Dispense:  180 tablet     Refill:  0   • topiramate (TOPAMAX) 100 MG tablet     Sig: Take 1 tablet by mouth 2 (Two) Times a Day.     Dispense:  180 tablet     Refill:  0   • methylphenidate  (Concerta) 27 MG CR tablet     Sig: Take 1 tablet by mouth Daily     Dispense:  30 tablet     Refill:  0   • zolpidem (Ambien) 5 MG tablet     Sig: Take 1 tablet by mouth At Night As Needed for Sleep.     Dispense:  30 tablet     Refill:  0         Follow Up   Return in about 4 weeks (around 5/24/2023).  Patient was given instructions and counseling regarding her condition or for health maintenance advice. Please see specific information pulled into the AVS if appropriate.     TREATMENT PLAN/GOALS: Continue supportive psychotherapy efforts and medications as indicated. Treatment and medication options discussed during today's visit. Patient acknowledged and verbally consented to continue with current treatment plan and was educated on the importance of compliance with treatment and follow-up appointments.    MEDICATION ISSUES:  Discussed medication options and treatment plan of prescribed medication as well as the risks, benefits, and side effects including potential falls, possible impaired driving and metabolic adversities among others. Patient is agreeable to call the office with any worsening of symptoms or onset of side effects. Patient is agreeable to call 911 or go to the nearest ER should he/she begin having SI/HI.        [facility] [address] [place] [address] [phone]    April 26, 2023 13:21 EDT

## 2023-05-24 ENCOUNTER — TELEMEDICINE (OUTPATIENT)
Dept: BEHAVIORAL HEALTH | Facility: CLINIC | Age: 58
End: 2023-05-24
Payer: MEDICARE

## 2023-05-24 DIAGNOSIS — F90.2 ATTENTION DEFICIT HYPERACTIVITY DISORDER, COMBINED TYPE: ICD-10-CM

## 2023-05-24 DIAGNOSIS — F41.1 GENERALIZED ANXIETY DISORDER: ICD-10-CM

## 2023-05-24 DIAGNOSIS — F51.04 PSYCHOPHYSIOLOGICAL INSOMNIA: ICD-10-CM

## 2023-05-24 DIAGNOSIS — F31.81 BIPOLAR II DISORDER: Primary | ICD-10-CM

## 2023-05-24 RX ORDER — LAMOTRIGINE 200 MG/1
200 TABLET ORAL 2 TIMES DAILY
Qty: 180 TABLET | Refills: 0 | Status: SHIPPED | OUTPATIENT
Start: 2023-05-24

## 2023-05-24 RX ORDER — TOPIRAMATE 100 MG/1
100 TABLET, FILM COATED ORAL 2 TIMES DAILY
Qty: 180 TABLET | Refills: 0 | Status: SHIPPED | OUTPATIENT
Start: 2023-05-24

## 2023-05-24 RX ORDER — BUPROPION HYDROCHLORIDE 150 MG/1
150 TABLET ORAL EVERY MORNING
Qty: 90 TABLET | Refills: 0 | Status: SHIPPED | OUTPATIENT
Start: 2023-05-24

## 2023-05-24 RX ORDER — METHYLPHENIDATE HYDROCHLORIDE 27 MG/1
27 TABLET ORAL DAILY
Qty: 30 TABLET | Refills: 0 | Status: SHIPPED | OUTPATIENT
Start: 2023-05-24

## 2023-05-24 RX ORDER — HYDROXYZINE 50 MG/1
50 TABLET, FILM COATED ORAL NIGHTLY
Qty: 90 TABLET | Refills: 0 | Status: SHIPPED | OUTPATIENT
Start: 2023-05-24

## 2023-05-24 RX ORDER — BUSPIRONE HYDROCHLORIDE 5 MG/1
5 TABLET ORAL 3 TIMES DAILY
Qty: 270 TABLET | Refills: 0 | Status: SHIPPED | OUTPATIENT
Start: 2023-05-24

## 2023-05-24 RX ORDER — ZOLPIDEM TARTRATE 5 MG/1
5 TABLET ORAL NIGHTLY PRN
Qty: 30 TABLET | Refills: 1 | Status: SHIPPED | OUTPATIENT
Start: 2023-05-24

## 2023-05-24 RX ORDER — CITALOPRAM 20 MG/1
20 TABLET ORAL DAILY
Qty: 90 TABLET | Refills: 0 | Status: SHIPPED | OUTPATIENT
Start: 2023-05-24

## 2023-05-24 NOTE — PROGRESS NOTES
This provider is located at [facility], Behavioral Health ,[address] [facility] in [place], [place],using a secure MyChart Video Visit through [employer]. Patient is being seen remotely via telehealth at their home address in [place], and stated they are in a secure environment for this session. The patient's condition being diagnosed/treated is appropriate for telemedicine. The provider identified herself as well as her credentials.   The patient, and/or patients guardian, consent to be seen remotely, and when consent is given they understand that the consent allows for patient identifiable information to be sent to a third party as needed.   They may refuse to be seen remotely at any time. The electronic data is encrypted and password protected, and the patient and/or guardian has been advised of the potential risks to privacy not withstanding such measures.    Video Visit    Patient Name: Eleanor Preston  : 1965   MRN: [id number]   Care Team: Patient Care Team:  Dewey Johnson MD as PCP - General (Family Medicine)  Lia Marroquin APRN as Nurse Practitioner (Behavioral Health)        Chief Complaint:    Chief Complaint   Patient presents with   • Bipolar 2   • Anxiety   • ADHD   • Sleeping Problem   • Med Management       History of Present Illness: Eleanor Preston is a 58 y.o. female who presents via video visit for a medication management follow up. Patient reports that medication continues to be effective. She states that since switching to the Concerta she has been doing much better. She states she doesn't have any racing thoughts and does not feel tired during the day. She feels that her mood is in a good spot as well. She continues to sleep well for the most part and did not have any concerns in that regard. She did not see the need to make any changes today and did not have any medication concerns at today's visit.     The following portion of the patient's  history were reviewed and updated appropriately: allergies, current and past medications, family history, medical history and social history.  Subjective   Review of Systems:    Review of Systems   All other systems reviewed and are negative.      Current Medications:   Current Outpatient Medications   Medication Sig Dispense Refill   • buPROPion XL (WELLBUTRIN XL) 150 MG 24 hr tablet Take 1 tablet by mouth Every Morning. 90 tablet 0   • busPIRone (BUSPAR) 5 MG tablet Take 1 tablet by mouth 3 (Three) Times a Day. 270 tablet 0   • citalopram (CeleXA) 20 MG tablet Take 1 tablet by mouth Daily. 90 tablet 0   • hydrOXYzine (ATARAX) 50 MG tablet Take 1 tablet by mouth Every Night. 90 tablet 0   • lamoTRIgine (LaMICtal) 200 MG tablet Take 1 tablet by mouth 2 (Two) Times a Day. 180 tablet 0   • methylphenidate (Concerta) 27 MG CR tablet Take 1 tablet by mouth Daily 30 tablet 0   • topiramate (TOPAMAX) 100 MG tablet Take 1 tablet by mouth 2 (Two) Times a Day. 180 tablet 0   • zolpidem (Ambien) 5 MG tablet Take 1 tablet by mouth At Night As Needed for Sleep. 30 tablet 1   • estradiol (ESTRACE) 2 MG tablet      • multivitamin with minerals tablet tablet        No current facility-administered medications for this visit.       Mental Status Exam:   Hygiene:   unable to assess  Cooperation:  Cooperative  Eye Contact:  Good  Psychomotor Behavior:  unable to assess  Affect:  Appropriate  Mood: normal  Speech:  Normal  Thought Process:  Goal directed and Linear  Thought Content:  Normal and Mood congruent  Suicidal:  None  Homicidal:  None  Hallucinations:  None  Delusion:  None  Memory:  Intact  Orientation:  Person, Place, Time and Situation  Reliability:  good  Insight:  Good  Judgement:  Good  Impulse Control:  Good  Physical/Medical Issues:  No      Objective   Vital Signs:   There were no vitals taken for this visit.      Assessment / Plan    Diagnoses and all orders for this visit:    1. Bipolar II disorder (Primary)  -      buPROPion XL (WELLBUTRIN XL) 150 MG 24 hr tablet; Take 1 tablet by mouth Every Morning.  Dispense: 90 tablet; Refill: 0  -     busPIRone (BUSPAR) 5 MG tablet; Take 1 tablet by mouth 3 (Three) Times a Day.  Dispense: 270 tablet; Refill: 0  -     citalopram (CeleXA) 20 MG tablet; Take 1 tablet by mouth Daily.  Dispense: 90 tablet; Refill: 0  -     lamoTRIgine (LaMICtal) 200 MG tablet; Take 1 tablet by mouth 2 (Two) Times a Day.  Dispense: 180 tablet; Refill: 0  -     topiramate (TOPAMAX) 100 MG tablet; Take 1 tablet by mouth 2 (Two) Times a Day.  Dispense: 180 tablet; Refill: 0    2. Psychophysiological insomnia  -     zolpidem (Ambien) 5 MG tablet; Take 1 tablet by mouth At Night As Needed for Sleep.  Dispense: 30 tablet; Refill: 1  -     hydrOXYzine (ATARAX) 50 MG tablet; Take 1 tablet by mouth Every Night.  Dispense: 90 tablet; Refill: 0    3. Attention deficit hyperactivity disorder, combined type  -     methylphenidate (Concerta) 27 MG CR tablet; Take 1 tablet by mouth Daily  Dispense: 30 tablet; Refill: 0    4. Generalized anxiety disorder  -     buPROPion XL (WELLBUTRIN XL) 150 MG 24 hr tablet; Take 1 tablet by mouth Every Morning.  Dispense: 90 tablet; Refill: 0  -     busPIRone (BUSPAR) 5 MG tablet; Take 1 tablet by mouth 3 (Three) Times a Day.  Dispense: 270 tablet; Refill: 0  -     citalopram (CeleXA) 20 MG tablet; Take 1 tablet by mouth Daily.  Dispense: 90 tablet; Refill: 0  -     hydrOXYzine (ATARAX) 50 MG tablet; Take 1 tablet by mouth Every Night.  Dispense: 90 tablet; Refill: 0  -     topiramate (TOPAMAX) 100 MG tablet; Take 1 tablet by mouth 2 (Two) Times a Day.  Dispense: 180 tablet; Refill: 0      Patient appears to be doing well on current medication. No issues reported and no indication for change. Will continue medication as ordered.     A psychological evaluation was conducted in order to assess past and current level of functioning. Areas assessed included, but were not limited to: perception  of social support, perception of ability to face and deal with challenges in life (positive functioning), anxiety symptoms, depressive symptoms, perspective on beliefs/belief system, coping skills for stress, intelligence level,  Therapeutic rapport was established. Interventions conducted today were geared towards incorporating medication management along with support for continued therapy. Education was also provided as to the med management with this provider and what to expect in subsequent sessions.      We discussed risks, benefits, goals and side effects of the above medication and the patient was agreeable with the plan. Patient was educated on the importance of compliance with treatment and follow-up appointments. Patient is aware to contact the [facility] with any worsening of symptoms. To call for questions or concerns and return early if necessary. Patent is agreeable to go to the Emergency Department or call 911 should they begin SI/HI.        MEDS ORDERED DURING VISIT:  New Medications Ordered This Visit   Medications   • zolpidem (Ambien) 5 MG tablet     Sig: Take 1 tablet by mouth At Night As Needed for Sleep.     Dispense:  30 tablet     Refill:  1   • methylphenidate (Concerta) 27 MG CR tablet     Sig: Take 1 tablet by mouth Daily     Dispense:  30 tablet     Refill:  0   • buPROPion XL (WELLBUTRIN XL) 150 MG 24 hr tablet     Sig: Take 1 tablet by mouth Every Morning.     Dispense:  90 tablet     Refill:  0   • busPIRone (BUSPAR) 5 MG tablet     Sig: Take 1 tablet by mouth 3 (Three) Times a Day.     Dispense:  270 tablet     Refill:  0   • citalopram (CeleXA) 20 MG tablet     Sig: Take 1 tablet by mouth Daily.     Dispense:  90 tablet     Refill:  0   • hydrOXYzine (ATARAX) 50 MG tablet     Sig: Take 1 tablet by mouth Every Night.     Dispense:  90 tablet     Refill:  0   • lamoTRIgine (LaMICtal) 200 MG tablet     Sig: Take 1 tablet by mouth 2 (Two) Times a Day.     Dispense:  180 tablet      Refill:  0   • topiramate (TOPAMAX) 100 MG tablet     Sig: Take 1 tablet by mouth 2 (Two) Times a Day.     Dispense:  180 tablet     Refill:  0         Follow Up   Return in about 8 weeks (around 7/19/2023).  Patient was given instructions and counseling regarding her condition or for health maintenance advice. Please see specific information pulled into the AVS if appropriate.     TREATMENT PLAN/GOALS: Continue supportive psychotherapy efforts and medications as indicated. Treatment and medication options discussed during today's visit. Patient acknowledged and verbally consented to continue with current treatment plan and was educated on the importance of compliance with treatment and follow-up appointments.    MEDICATION ISSUES:  Discussed medication options and treatment plan of prescribed medication as well as the risks, benefits, and side effects including potential falls, possible impaired driving and metabolic adversities among others. Patient is agreeable to call the office with any worsening of symptoms or onset of side effects. Patient is agreeable to call 911 or go to the nearest ER should he/she begin having SI/HI.        [facility] [address] [place] [address]  [phone]    May 24, 2023 12:11 EDT

## 2023-07-24 ENCOUNTER — TELEMEDICINE (OUTPATIENT)
Dept: BEHAVIORAL HEALTH | Facility: CLINIC | Age: 58
End: 2023-07-24
Payer: MEDICARE

## 2023-07-24 DIAGNOSIS — F90.2 ATTENTION DEFICIT HYPERACTIVITY DISORDER, COMBINED TYPE: ICD-10-CM

## 2023-07-24 DIAGNOSIS — F51.04 PSYCHOPHYSIOLOGICAL INSOMNIA: ICD-10-CM

## 2023-07-24 DIAGNOSIS — F41.1 GENERALIZED ANXIETY DISORDER: ICD-10-CM

## 2023-07-24 DIAGNOSIS — F31.81 BIPOLAR II DISORDER: Primary | ICD-10-CM

## 2023-07-24 PROCEDURE — 1159F MED LIST DOCD IN RCRD: CPT

## 2023-07-24 PROCEDURE — 99214 OFFICE O/P EST MOD 30 MIN: CPT

## 2023-07-24 PROCEDURE — 1160F RVW MEDS BY RX/DR IN RCRD: CPT

## 2023-07-24 RX ORDER — ZOLPIDEM TARTRATE 5 MG/1
5 TABLET ORAL NIGHTLY PRN
Qty: 30 TABLET | Refills: 1 | Status: SHIPPED | OUTPATIENT
Start: 2023-07-24

## 2023-07-24 RX ORDER — CITALOPRAM 20 MG/1
20 TABLET ORAL DAILY
Qty: 90 TABLET | Refills: 0 | Status: SHIPPED | OUTPATIENT
Start: 2023-07-24

## 2023-07-24 RX ORDER — LAMOTRIGINE 200 MG/1
200 TABLET ORAL 2 TIMES DAILY
Qty: 180 TABLET | Refills: 0 | Status: SHIPPED | OUTPATIENT
Start: 2023-07-24

## 2023-07-24 RX ORDER — TOPIRAMATE 100 MG/1
100 TABLET, FILM COATED ORAL 2 TIMES DAILY
Qty: 180 TABLET | Refills: 0 | Status: SHIPPED | OUTPATIENT
Start: 2023-07-24

## 2023-07-24 RX ORDER — BUPROPION HYDROCHLORIDE 150 MG/1
150 TABLET ORAL EVERY MORNING
Qty: 90 TABLET | Refills: 0 | Status: SHIPPED | OUTPATIENT
Start: 2023-07-24

## 2023-07-24 RX ORDER — BUSPIRONE HYDROCHLORIDE 5 MG/1
5 TABLET ORAL 3 TIMES DAILY
Qty: 270 TABLET | Refills: 0 | Status: SHIPPED | OUTPATIENT
Start: 2023-07-24

## 2023-07-24 NOTE — PROGRESS NOTES
This provider is located at [facility], Behavioral [facility] ,[address], [address] [facility] in [place], [place],using a secure MyChart Video Visit through [employer]. Patient is being seen remotely via telehealth at their home address in [place], and stated they are in a secure environment for this session. The patient's condition being diagnosed/treated is appropriate for telemedicine. The provider identified herself as well as her credentials.   The patient, and/or patients guardian, consent to be seen remotely, and when consent is given they understand that the consent allows for patient identifiable information to be sent to a third party as needed.   They may refuse to be seen remotely at any time. The electronic data is encrypted and password protected, and the patient and/or guardian has been advised of the potential risks to privacy not withstanding such measures.    Video Visit    Patient Name: Eleanor Preston  : 1965   MRN: [id number]   Care Team: Patient Care Team:  Dewey Johnson MD as PCP - General (Family Medicine)  Lia Marroquin APRN as Nurse Practitioner (Behavioral Health)         Chief Complaint:    Chief Complaint   Patient presents with    Bipolar     Anxiety    ADHD    Sleeping Problem    Med Management       History of Present Illness: Eleanor Preston is a 58 y.o. female who presents via video visit for a medication management follow up. Patient reports that overall medication continues to be effective. She endorses some situational stressors along with starting the process of getting a divorce which abby been hard. However, she does feel that medication has helped and made things manageable during this time. She did not see the need to make any changes and did not have any medication concerns at today's visit. She denies SI/HI today.     The following portion of the patient's history were reviewed and updated appropriately: allergies, current and past  medications, family history, medical history and social history.  Subjective   Review of Systems:    Review of Systems   Psychiatric/Behavioral:  Positive for depressed mood and stress. The patient is nervous/anxious.    All other systems reviewed and are negative.    Current Medications:   Current Outpatient Medications   Medication Sig Dispense Refill    buPROPion XL (WELLBUTRIN XL) 150 MG 24 hr tablet Take 1 tablet by mouth Every Morning. 90 tablet 0    busPIRone (BUSPAR) 5 MG tablet Take 1 tablet by mouth 3 (Three) Times a Day. 270 tablet 0    citalopram (CeleXA) 20 MG tablet Take 1 tablet by mouth Daily. 90 tablet 0    lamoTRIgine (LaMICtal) 200 MG tablet Take 1 tablet by mouth 2 (Two) Times a Day. 180 tablet 0    topiramate (TOPAMAX) 100 MG tablet Take 1 tablet by mouth 2 (Two) Times a Day. 180 tablet 0    zolpidem (Ambien) 5 MG tablet Take 1 tablet by mouth At Night As Needed for Sleep. 30 tablet 1    estradiol (ESTRACE) 2 MG tablet       hydrOXYzine (ATARAX) 50 MG tablet Take 1 tablet by mouth Every Night. 90 tablet 0    methylphenidate (Concerta) 27 MG CR tablet Take 1 tablet by mouth Daily 30 tablet 0    multivitamin with minerals tablet tablet        No current facility-administered medications for this visit.       Mental Status Exam:   Hygiene:    unable to assess  Cooperation:  Cooperative  Eye Contact:  Good  Psychomotor Behavior:   unable to assess  Affect:  Appropriate  Mood: sad, depressed, and anxious  Speech:  Normal  Thought Process:  Goal directed and Linear  Thought Content:  Normal and Mood congruent  Suicidal:  None  Homicidal:  None  Hallucinations:  None  Delusion:  None  Memory:  Intact  Orientation:  Person, Place, Time, and Situation  Reliability:  good  Insight:  Good  Judgement:  Good  Impulse Control:  Good  Physical/Medical Issues:  No      Objective   Vital Signs:   There were no vitals taken for this visit.      Assessment / Plan    Diagnoses and all orders for this visit:    1.  Bipolar II disorder (Primary)  -     buPROPion XL (WELLBUTRIN XL) 150 MG 24 hr tablet; Take 1 tablet by mouth Every Morning.  Dispense: 90 tablet; Refill: 0  -     busPIRone (BUSPAR) 5 MG tablet; Take 1 tablet by mouth 3 (Three) Times a Day.  Dispense: 270 tablet; Refill: 0  -     citalopram (CeleXA) 20 MG tablet; Take 1 tablet by mouth Daily.  Dispense: 90 tablet; Refill: 0  -     lamoTRIgine (LaMICtal) 200 MG tablet; Take 1 tablet by mouth 2 (Two) Times a Day.  Dispense: 180 tablet; Refill: 0  -     topiramate (TOPAMAX) 100 MG tablet; Take 1 tablet by mouth 2 (Two) Times a Day.  Dispense: 180 tablet; Refill: 0    2. Psychophysiological insomnia  -     zolpidem (Ambien) 5 MG tablet; Take 1 tablet by mouth At Night As Needed for Sleep.  Dispense: 30 tablet; Refill: 1    3. Generalized anxiety disorder  -     buPROPion XL (WELLBUTRIN XL) 150 MG 24 hr tablet; Take 1 tablet by mouth Every Morning.  Dispense: 90 tablet; Refill: 0  -     busPIRone (BUSPAR) 5 MG tablet; Take 1 tablet by mouth 3 (Three) Times a Day.  Dispense: 270 tablet; Refill: 0  -     citalopram (CeleXA) 20 MG tablet; Take 1 tablet by mouth Daily.  Dispense: 90 tablet; Refill: 0  -     topiramate (TOPAMAX) 100 MG tablet; Take 1 tablet by mouth 2 (Two) Times a Day.  Dispense: 180 tablet; Refill: 0    4. Attention deficit hyperactivity disorder, combined type   - Continue Concerta 27 mg    Overall, patient appears to be doing well on current medication. No issues reported and no indication for change. Will continue medication as ordered.     A psychological evaluation was conducted in order to assess past and current level of functioning. Areas assessed included, but were not limited to: perception of social support, perception of ability to face and deal with challenges in life (positive functioning), anxiety symptoms, depressive symptoms, perspective on beliefs/belief system, coping skills for stress, intelligence level,  Therapeutic rapport was  established. Interventions conducted today were geared towards incorporating medication management along with support for continued therapy. Education was also provided as to the med management with this provider and what to expect in subsequent sessions.      We discussed risks, benefits, goals and side effects of the above medication and the patient was agreeable with the plan. Patient was educated on the importance of compliance with treatment and follow-up appointments. Patient is aware to contact the [facility] with any worsening of symptoms. To call for questions or concerns and return early if necessary. Patent is agreeable to go to the Emergency Department or call 911 should they begin SI/HI.        MEDS ORDERED DURING VISIT:  New Medications Ordered This Visit   Medications    zolpidem (Ambien) 5 MG tablet     Sig: Take 1 tablet by mouth At Night As Needed for Sleep.     Dispense:  30 tablet     Refill:  1    buPROPion XL (WELLBUTRIN XL) 150 MG 24 hr tablet     Sig: Take 1 tablet by mouth Every Morning.     Dispense:  90 tablet     Refill:  0    busPIRone (BUSPAR) 5 MG tablet     Sig: Take 1 tablet by mouth 3 (Three) Times a Day.     Dispense:  270 tablet     Refill:  0    citalopram (CeleXA) 20 MG tablet     Sig: Take 1 tablet by mouth Daily.     Dispense:  90 tablet     Refill:  0    lamoTRIgine (LaMICtal) 200 MG tablet     Sig: Take 1 tablet by mouth 2 (Two) Times a Day.     Dispense:  180 tablet     Refill:  0    topiramate (TOPAMAX) 100 MG tablet     Sig: Take 1 tablet by mouth 2 (Two) Times a Day.     Dispense:  180 tablet     Refill:  0         Follow Up   Return in about 6 weeks (around 9/4/2023).  Patient was given instructions and counseling regarding her condition or for health maintenance advice. Please see specific information pulled into the AVS if appropriate.     TREATMENT PLAN/GOALS: Continue supportive psychotherapy efforts and medications as indicated. Treatment and medication options  discussed during today's visit. Patient acknowledged and verbally consented to continue with current treatment plan and was educated on the importance of compliance with treatment and follow-up appointments.    MEDICATION ISSUES:  Discussed medication options and treatment plan of prescribed medication as well as the risks, benefits, and side effects including potential falls, possible impaired driving and metabolic adversities among others. Patient is agreeable to call the office with any worsening of symptoms or onset of side effects. Patient is agreeable to call 911 or go to the nearest ER should he/she begin having SI/HI.        [facility]  [address] [place] [address] [address] [place] [id number] [phone]    July 25, 2023 10:51 EDT

## 2023-08-01 DIAGNOSIS — F90.2 ATTENTION DEFICIT HYPERACTIVITY DISORDER, COMBINED TYPE: ICD-10-CM

## 2023-08-01 DIAGNOSIS — F51.04 PSYCHOPHYSIOLOGICAL INSOMNIA: ICD-10-CM

## 2023-08-02 RX ORDER — METHYLPHENIDATE HYDROCHLORIDE 27 MG/1
27 TABLET ORAL DAILY
Qty: 30 TABLET | Refills: 0 | Status: SHIPPED | OUTPATIENT
Start: 2023-08-02 | End: 2023-08-02 | Stop reason: SDUPTHER

## 2023-08-02 RX ORDER — METHYLPHENIDATE HYDROCHLORIDE 27 MG/1
27 TABLET ORAL DAILY
Qty: 30 TABLET | Refills: 0 | Status: SHIPPED | OUTPATIENT
Start: 2023-08-02

## 2023-08-02 RX ORDER — ZOLPIDEM TARTRATE 5 MG/1
5 TABLET ORAL NIGHTLY PRN
Qty: 30 TABLET | Refills: 1 | Status: SHIPPED | OUTPATIENT
Start: 2023-08-02

## 2023-08-28 ENCOUNTER — TELEPHONE (OUTPATIENT)
Dept: INTERNAL MEDICINE | Facility: CLINIC | Age: 58
End: 2023-08-28
Payer: MEDICARE

## 2023-08-28 DIAGNOSIS — F51.04 PSYCHOPHYSIOLOGICAL INSOMNIA: Primary | ICD-10-CM

## 2023-08-28 RX ORDER — ZOLPIDEM TARTRATE 10 MG/1
10 TABLET ORAL NIGHTLY PRN
Qty: 30 TABLET | Refills: 1 | Status: SHIPPED | OUTPATIENT
Start: 2023-08-28

## 2023-08-28 NOTE — TELEPHONE ENCOUNTER
Has been taking Ambien 5mg. Last couple of nights, she has taken 10mg, since she's having trouble sleeping, and it has done better. Wants to know if it can be uppped to the 10mg. If so, please send to [facility].

## 2023-08-30 ENCOUNTER — OFFICE VISIT (OUTPATIENT)
Dept: BEHAVIORAL HEALTH | Facility: CLINIC | Age: 58
End: 2023-08-30
Payer: MEDICARE

## 2023-08-30 DIAGNOSIS — F31.81 BIPOLAR II DISORDER: Primary | ICD-10-CM

## 2023-08-30 DIAGNOSIS — F43.9 TRAUMA AND STRESSOR-RELATED DISORDER: ICD-10-CM

## 2023-08-30 DIAGNOSIS — F41.1 GENERALIZED ANXIETY DISORDER: ICD-10-CM
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
## 2023-09-05 DIAGNOSIS — F90.2 ATTENTION DEFICIT HYPERACTIVITY DISORDER, COMBINED TYPE: ICD-10-CM

## 2023-09-05 RX ORDER — METHYLPHENIDATE HYDROCHLORIDE 27 MG/1
27 TABLET ORAL DAILY
Qty: 30 TABLET | Refills: 0 | Status: SHIPPED | OUTPATIENT
Start: 2023-09-05

## 2023-09-05 NOTE — TELEPHONE ENCOUNTER
Rx Refill Note  Requested Prescriptions     Pending Prescriptions Disp Refills    methylphenidate (Concerta) 27 MG CR tablet 30 tablet 0     Sig: Take 1 tablet by mouth Daily      Last office visit with prescribing clinician: 10/26/2022   Last telemedicine visit with prescribing clinician: Visit date not found   Next office visit with prescribing clinician: 9/14/2023                         Would you like a call back once the refill request has been completed: [] Yes [] No    If the office needs to give you a call back, can they leave a voicemail: [] Yes [] No    Rhonda Cuevas MA  09/05/23, 12:05 EDT

## 2023-09-05 NOTE — TELEPHONE ENCOUNTER
Incoming Refill Request      Medication requested (name and dose): CONCERTA 27MG    Pharmacy where request should be sent: [facility]    Additional details provided by patient: N/A    Best call back number: [phone]    Does the patient have less than a 3 day supply:  [x] Yes  [] No    Lacey Bell  09/05/23, 10:31 EDT

## 2023-09-14 ENCOUNTER — TELEMEDICINE (OUTPATIENT)
Dept: BEHAVIORAL HEALTH | Facility: CLINIC | Age: 58
End: 2023-09-14
Payer: MEDICARE

## 2023-09-14 DIAGNOSIS — F41.1 GENERALIZED ANXIETY DISORDER: ICD-10-CM

## 2023-09-14 DIAGNOSIS — F51.04 PSYCHOPHYSIOLOGICAL INSOMNIA: ICD-10-CM

## 2023-09-14 DIAGNOSIS — F31.81 BIPOLAR II DISORDER: ICD-10-CM

## 2023-09-14 DIAGNOSIS — F90.2 ATTENTION DEFICIT HYPERACTIVITY DISORDER, COMBINED TYPE: Primary | ICD-10-CM

## 2023-09-14 RX ORDER — METHYLPHENIDATE HYDROCHLORIDE 36 MG/1
36 TABLET ORAL DAILY
Qty: 30 TABLET | Refills: 0 | Status: SHIPPED | OUTPATIENT
Start: 2023-09-14

## 2023-09-14 RX ORDER — BUSPIRONE HYDROCHLORIDE 10 MG/1
10 TABLET ORAL 3 TIMES DAILY
Qty: 270 TABLET | Refills: 0 | Status: SHIPPED | OUTPATIENT
Start: 2023-09-14

## 2023-09-14 RX ORDER — CITALOPRAM 20 MG/1
20 TABLET ORAL DAILY
Qty: 90 TABLET | Refills: 0 | Status: SHIPPED | OUTPATIENT
Start: 2023-09-14

## 2023-09-14 RX ORDER — TOPIRAMATE 100 MG/1
100 TABLET, FILM COATED ORAL 2 TIMES DAILY
Qty: 180 TABLET | Refills: 0 | Status: SHIPPED | OUTPATIENT
Start: 2023-09-14

## 2023-09-14 RX ORDER — BUPROPION HYDROCHLORIDE 150 MG/1
150 TABLET ORAL EVERY MORNING
Qty: 90 TABLET | Refills: 0 | Status: SHIPPED | OUTPATIENT
Start: 2023-09-14

## 2023-09-14 RX ORDER — HYDROXYZINE 50 MG/1
50 TABLET, FILM COATED ORAL NIGHTLY
Qty: 90 TABLET | Refills: 0 | Status: SHIPPED | OUTPATIENT
Start: 2023-09-14

## 2023-09-14 RX ORDER — LAMOTRIGINE 200 MG/1
200 TABLET ORAL 2 TIMES DAILY
Qty: 180 TABLET | Refills: 0 | Status: SHIPPED | OUTPATIENT
Start: 2023-09-14

## 2023-09-14 NOTE — PROGRESS NOTES
"This provider is located at [facility] ,[address], [address] in [place], [place],using a secure MyChart Video Visit through [employer]. Patient is being seen remotely via telehealth at their home address in [place], and stated they are in a secure environment for this session. The patient's condition being diagnosed/treated is appropriate for telemedicine. The provider identified herself as well as her credentials.   The patient, and/or patients guardian, consent to be seen remotely, and when consent is given they understand that the consent allows for patient identifiable information to be sent to a third party as needed.   They may refuse to be seen remotely at any time. The electronic data is encrypted and password protected, and the patient and/or guardian has been advised of the potential risks to privacy not withstanding such measures.    Video Visit    Patient Name: Eleanor Preston  : 1965   MRN: [id number]   Care Team: Patient Care Team:  Dewey Johnson MD as PCP - General (Family Medicine)  Lia Marroquin APRN as Nurse Practitioner (Behavioral Health)         Chief Complaint:    Chief Complaint   Patient presents with    ADHD    Anxiety    Sleeping Problem    Bipolar 2    Med Management       History of Present Illness: Eleanor Preston is a 58 y.o. female who presents via video visit for a medication management follow up. Patient reports that she has been struggling lately. She knows part of it is normal as she is going through a divorce, but she feels that she cannot slow her thoughts down and her anxiety has been much higher. She is depressed, but feels that is a \"normal\" depressed for what she is going through. She denies SI/HI today.     The following portion of the patient's history were reviewed and updated appropriately: allergies, current and past medications, family history, medical history and social history.  Subjective "   Review of Systems:    Review of Systems   Psychiatric/Behavioral:  Positive for depressed mood and stress. The patient is nervous/anxious.    All other systems reviewed and are negative.    Current Medications:   Current Outpatient Medications   Medication Sig Dispense Refill    buPROPion XL (WELLBUTRIN XL) 150 MG 24 hr tablet Take 1 tablet by mouth Every Morning. 90 tablet 0    citalopram (CeleXA) 20 MG tablet Take 1 tablet by mouth Daily. 90 tablet 0    hydrOXYzine (ATARAX) 50 MG tablet Take 1 tablet by mouth Every Night. 90 tablet 0    lamoTRIgine (LaMICtal) 200 MG tablet Take 1 tablet by mouth 2 (Two) Times a Day. 180 tablet 0    topiramate (TOPAMAX) 100 MG tablet Take 1 tablet by mouth 2 (Two) Times a Day. 180 tablet 0    busPIRone (BUSPAR) 10 MG tablet Take 1 tablet by mouth 3 (Three) Times a Day. 270 tablet 0    estradiol (ESTRACE) 2 MG tablet       methylphenidate (Concerta) 36 MG CR tablet Take 1 tablet by mouth Daily 30 tablet 0    multivitamin with minerals tablet tablet       zolpidem (AMBIEN) 10 MG tablet Take 1 tablet by mouth At Night As Needed for Sleep. 30 tablet 1     No current facility-administered medications for this visit.       Mental Status Exam:   Hygiene:    unable to assess  Cooperation:  Cooperative  Eye Contact:  Good  Psychomotor Behavior:   unable to assess  Affect:  Appropriate  Mood: sad, depressed, and anxious  Speech:  Normal  Thought Process:  Goal directed and Linear  Thought Content:  Normal and Mood congruent  Suicidal:  None  Homicidal:  None  Hallucinations:  None  Delusion:  None  Memory:  Intact  Orientation:  Person, Place, Time, and Situation  Reliability:  good  Insight:  Good  Judgement:  Good  Impulse Control:  Good  Physical/Medical Issues:  No      Objective   Vital Signs:   There were no vitals taken for this visit.      Assessment / Plan    Diagnoses and all orders for this visit:    1. Attention deficit hyperactivity disorder, combined type (Primary)  -      methylphenidate (Concerta) 36 MG CR tablet; Take 1 tablet by mouth Daily  Dispense: 30 tablet; Refill: 0    2. Generalized anxiety disorder  -     buPROPion XL (WELLBUTRIN XL) 150 MG 24 hr tablet; Take 1 tablet by mouth Every Morning.  Dispense: 90 tablet; Refill: 0  -     busPIRone (BUSPAR) 10 MG tablet; Take 1 tablet by mouth 3 (Three) Times a Day.  Dispense: 270 tablet; Refill: 0  -     citalopram (CeleXA) 20 MG tablet; Take 1 tablet by mouth Daily.  Dispense: 90 tablet; Refill: 0  -     hydrOXYzine (ATARAX) 50 MG tablet; Take 1 tablet by mouth Every Night.  Dispense: 90 tablet; Refill: 0  -     topiramate (TOPAMAX) 100 MG tablet; Take 1 tablet by mouth 2 (Two) Times a Day.  Dispense: 180 tablet; Refill: 0    3. Bipolar II disorder  -     buPROPion XL (WELLBUTRIN XL) 150 MG 24 hr tablet; Take 1 tablet by mouth Every Morning.  Dispense: 90 tablet; Refill: 0  -     citalopram (CeleXA) 20 MG tablet; Take 1 tablet by mouth Daily.  Dispense: 90 tablet; Refill: 0  -     lamoTRIgine (LaMICtal) 200 MG tablet; Take 1 tablet by mouth 2 (Two) Times a Day.  Dispense: 180 tablet; Refill: 0  -     topiramate (TOPAMAX) 100 MG tablet; Take 1 tablet by mouth 2 (Two) Times a Day.  Dispense: 180 tablet; Refill: 0    4. Psychophysiological insomnia  -     hydrOXYzine (ATARAX) 50 MG tablet; Take 1 tablet by mouth Every Night.  Dispense: 90 tablet; Refill: 0    Will increase Buspar and Concerta. Continue all other medication as ordered.     A psychological evaluation was conducted in order to assess past and current level of functioning. Areas assessed included, but were not limited to: perception of social support, perception of ability to face and deal with challenges in life (positive functioning), anxiety symptoms, depressive symptoms, perspective on beliefs/belief system, coping skills for stress, intelligence level,  Therapeutic rapport was established. Interventions conducted today were geared towards incorporating medication  management along with support for continued therapy. Education was also provided as to the med management with this provider and what to expect in subsequent sessions.      We discussed risks, benefits, goals and side effects of the above medication and the patient was agreeable with the plan. Patient was educated on the importance of compliance with treatment and follow-up appointments. Patient is aware to contact the [facility] with any worsening of symptoms. To call for questions or concerns and return early if necessary. Patent is agreeable to go to the Emergency Department or call 911 should they begin SI/HI.        MEDS ORDERED DURING VISIT:  New Medications Ordered This Visit   Medications    methylphenidate (Concerta) 36 MG CR tablet     Sig: Take 1 tablet by mouth Daily     Dispense:  30 tablet     Refill:  0    buPROPion XL (WELLBUTRIN XL) 150 MG 24 hr tablet     Sig: Take 1 tablet by mouth Every Morning.     Dispense:  90 tablet     Refill:  0    busPIRone (BUSPAR) 10 MG tablet     Sig: Take 1 tablet by mouth 3 (Three) Times a Day.     Dispense:  270 tablet     Refill:  0    citalopram (CeleXA) 20 MG tablet     Sig: Take 1 tablet by mouth Daily.     Dispense:  90 tablet     Refill:  0    hydrOXYzine (ATARAX) 50 MG tablet     Sig: Take 1 tablet by mouth Every Night.     Dispense:  90 tablet     Refill:  0    lamoTRIgine (LaMICtal) 200 MG tablet     Sig: Take 1 tablet by mouth 2 (Two) Times a Day.     Dispense:  180 tablet     Refill:  0    topiramate (TOPAMAX) 100 MG tablet     Sig: Take 1 tablet by mouth 2 (Two) Times a Day.     Dispense:  180 tablet     Refill:  0         Follow Up   Return in about 8 weeks (around 11/9/2023).  Patient was given instructions and counseling regarding her condition or for health maintenance advice. Please see specific information pulled into the AVS if appropriate.     TREATMENT PLAN/GOALS: Continue supportive psychotherapy efforts and medications as indicated.  Treatment and medication options discussed during today's visit. Patient acknowledged and verbally consented to continue with current treatment plan and was educated on the importance of compliance with treatment and follow-up appointments.    MEDICATION ISSUES:  Discussed medication options and treatment plan of prescribed medication as well as the risks, benefits, and side effects including potential falls, possible impaired driving and metabolic adversities among others. Patient is agreeable to call the office with any worsening of symptoms or onset of side effects. Patient is agreeable to call 911 or go to the nearest ER should he/she begin having SI/HI.        [facility]  [address] [address] [phone]    September 14, 2023 10:43 EDT

## 2023-09-20 ENCOUNTER — TELEPHONE (OUTPATIENT)
Dept: INTERNAL MEDICINE | Facility: CLINIC | Age: 58
End: 2023-09-20
Payer: MEDICARE

## 2023-09-20 NOTE — TELEPHONE ENCOUNTER
Patient states that she thinks the depression is getting worse. She can't stop crying, has no energy, and has no desire to do anything.  She is afraid that it is more than situational and doesn't want it to get out of hand.  Please advise.  Phone number verified.

## 2023-09-20 NOTE — TELEPHONE ENCOUNTER
Spoke with patient. She did not want to increase her Celexa at this time due to it previously worsening her symptoms. She understands that a huge part of the issue is what is going on in her personal life. I recommended maybe she wait a little longer to see how she does on her medication changes and to call us next week to give us an update. She is looking for new a therapist, I let her know there are quite a few at [facility] in [place].

## 2023-10-11 DIAGNOSIS — F90.2 ATTENTION DEFICIT HYPERACTIVITY DISORDER, COMBINED TYPE: ICD-10-CM

## 2023-10-11 DIAGNOSIS — F51.04 PSYCHOPHYSIOLOGICAL INSOMNIA: ICD-10-CM

## 2023-10-11 RX ORDER — METHYLPHENIDATE HYDROCHLORIDE 36 MG/1
36 TABLET ORAL DAILY
Qty: 30 TABLET | Refills: 0 | Status: SHIPPED | OUTPATIENT
Start: 2023-10-11

## 2023-10-11 RX ORDER — ZOLPIDEM TARTRATE 10 MG/1
10 TABLET ORAL NIGHTLY PRN
Qty: 30 TABLET | Refills: 1 | Status: CANCELLED | OUTPATIENT
Start: 2023-10-11

## 2023-10-11 RX ORDER — ZOLPIDEM TARTRATE 10 MG/1
10 TABLET ORAL NIGHTLY PRN
Qty: 30 TABLET | Refills: 1 | Status: SHIPPED | OUTPATIENT
Start: 2023-10-11

## 2023-10-11 RX ORDER — METHYLPHENIDATE HYDROCHLORIDE 36 MG/1
36 TABLET ORAL DAILY
Qty: 30 TABLET | Refills: 0 | Status: CANCELLED | OUTPATIENT
Start: 2023-10-11

## 2023-11-13 ENCOUNTER — TRANSCRIBE ORDERS (OUTPATIENT)
Dept: ADMINISTRATIVE | Facility: HOSPITAL | Age: 58
End: 2023-11-13
Payer: MEDICARE

## 2023-11-13 DIAGNOSIS — M48.061 SPINAL STENOSIS OF LUMBAR REGION AT MULTIPLE LEVELS: Primary | ICD-10-CM

## 2023-11-14 ENCOUNTER — OFFICE VISIT (OUTPATIENT)
Dept: BEHAVIORAL HEALTH | Facility: CLINIC | Age: 58
End: 2023-11-14
Payer: MEDICARE

## 2023-11-14 VITALS — WEIGHT: 159 LBS | HEIGHT: 65 IN | BODY MASS INDEX: 26.49 KG/M2

## 2023-11-14 DIAGNOSIS — F51.04 PSYCHOPHYSIOLOGICAL INSOMNIA: ICD-10-CM

## 2023-11-14 DIAGNOSIS — F90.2 ATTENTION DEFICIT HYPERACTIVITY DISORDER, COMBINED TYPE: ICD-10-CM

## 2023-11-14 DIAGNOSIS — F41.1 GENERALIZED ANXIETY DISORDER: ICD-10-CM

## 2023-11-14 DIAGNOSIS — F31.81 BIPOLAR II DISORDER: Primary | ICD-10-CM

## 2023-11-14 DIAGNOSIS — Z51.81 ENCOUNTER FOR THERAPEUTIC DRUG MONITORING: ICD-10-CM

## 2023-11-14 RX ORDER — LAMOTRIGINE 200 MG/1
200 TABLET ORAL 2 TIMES DAILY
Qty: 180 TABLET | Refills: 0 | Status: SHIPPED | OUTPATIENT
Start: 2023-11-14

## 2023-11-14 RX ORDER — BUSPIRONE HYDROCHLORIDE 10 MG/1
10 TABLET ORAL 3 TIMES DAILY
Qty: 270 TABLET | Refills: 0 | Status: SHIPPED | OUTPATIENT
Start: 2023-11-14

## 2023-11-14 RX ORDER — METHYLPHENIDATE HYDROCHLORIDE 36 MG/1
36 TABLET ORAL DAILY
Qty: 30 TABLET | Refills: 0 | Status: SHIPPED | OUTPATIENT
Start: 2023-11-14

## 2023-11-14 RX ORDER — ROPINIROLE 0.5 MG/1
TABLET, FILM COATED ORAL
COMMUNITY
Start: 2023-09-16

## 2023-11-14 RX ORDER — TOPIRAMATE 100 MG/1
100 TABLET, FILM COATED ORAL 2 TIMES DAILY
Qty: 180 TABLET | Refills: 0 | Status: SHIPPED | OUTPATIENT
Start: 2023-11-14

## 2023-11-14 RX ORDER — NAPROXEN 500 MG/1
TABLET ORAL
COMMUNITY
Start: 2023-11-07

## 2023-11-14 RX ORDER — GABAPENTIN 300 MG/1
CAPSULE ORAL
COMMUNITY
Start: 2023-11-13

## 2023-11-14 RX ORDER — VALACYCLOVIR HYDROCHLORIDE 500 MG/1
1 TABLET, FILM COATED ORAL EVERY 12 HOURS SCHEDULED
COMMUNITY
Start: 2023-09-27

## 2023-11-14 RX ORDER — OXYCODONE HYDROCHLORIDE AND ACETAMINOPHEN 5; 325 MG/1; MG/1
TABLET ORAL
COMMUNITY
Start: 2023-11-07

## 2023-11-14 RX ORDER — BUPROPION HYDROCHLORIDE 150 MG/1
150 TABLET ORAL EVERY MORNING
Qty: 90 TABLET | Refills: 0 | Status: SHIPPED | OUTPATIENT
Start: 2023-11-14

## 2023-11-14 RX ORDER — CITALOPRAM 20 MG/1
20 TABLET ORAL DAILY
Qty: 90 TABLET | Refills: 0 | Status: SHIPPED | OUTPATIENT
Start: 2023-11-14

## 2023-11-14 RX ORDER — HYDROXYZINE 50 MG/1
50 TABLET, FILM COATED ORAL NIGHTLY
Qty: 90 TABLET | Refills: 0 | Status: SHIPPED | OUTPATIENT
Start: 2023-11-14

## 2023-11-14 NOTE — PROGRESS NOTES
Follow Up Office Visit    Patient Name: Eleanor Preston  : 1965   MRN: [id number]   Care Team: Patient Care Team:  Maritza Cheng DO as PCP - General (Family Medicine)  Lia Marroquin APRN as Nurse Practitioner (Behavioral Health)         Chief Complaint:    Chief Complaint   Patient presents with    Bipolar     Anxiety    Sleeping Problem    ADHD    Med Management       History of Present Illness: Eleanor Preston is a 58 y.o. female who is here today for a medication management follow up. Patient reports that overall medication continues to be effective. She feels that her focus and concentration are doing good and her anxiety and mood are managed well. She also reports that she is sleeping good. She endorses some situational stressors and is dealing with a lot of pain, but for the most part she feels that medication helps make it manageable. She did not see the need to make any changes and did not have any medication concerns at today's visit. She denies SI/HI today.     The following portion of the patient's history were reviewed and updated appropriately: allergies, current and past medications, family history, medical history and social history.  Subjective   Review of Systems:    Review of Systems   Psychiatric/Behavioral:  Positive for stress.    All other systems reviewed and are negative.      Current Medications:   Current Outpatient Medications   Medication Sig Dispense Refill    buPROPion XL (WELLBUTRIN XL) 150 MG 24 hr tablet Take 1 tablet by mouth Every Morning. 90 tablet 0    busPIRone (BUSPAR) 10 MG tablet Take 1 tablet by mouth 3 (Three) Times a Day. 270 tablet 0    citalopram (CeleXA) 20 MG tablet Take 1 tablet by mouth Daily. 90 tablet 0    estradiol (ESTRACE) 2 MG tablet       hydrOXYzine (ATARAX) 50 MG tablet Take 1 tablet by mouth Every Night. 90 tablet 0    lamoTRIgine (LaMICtal) 200 MG tablet Take 1 tablet by mouth 2 (Two) Times a Day. 180 tablet 0    methylphenidate  "(Concerta) 36 MG CR tablet Take 1 tablet by mouth Daily 30 tablet 0    multivitamin with minerals tablet tablet       naproxen (NAPROSYN) 500 MG tablet TAKE 1 TABLET BY MOUTH EVERY 12 HOURS WITH FOOD OR MILK FOR 5 DAYS AS NEEDED      oxyCODONE-acetaminophen (PERCOCET) 5-325 MG per tablet TAKE 1/2 TO 1 TABLET BY MOUTH EVERY 6 HOURS FOR 5 DAYS AS NEEDED FOR BACK PAIN      rOPINIRole (REQUIP) 0.5 MG tablet TAKE 2 TABLETS BY MOUTH 1 TO 3 HOURS BEFORE BEDTIME      topiramate (TOPAMAX) 100 MG tablet Take 1 tablet by mouth 2 (Two) Times a Day. 180 tablet 0    valACYclovir (VALTREX) 500 MG tablet Take 1 tablet by mouth Every 12 (Twelve) Hours.      zolpidem (AMBIEN) 10 MG tablet Take 1 tablet by mouth At Night As Needed for Sleep. 30 tablet 1    gabapentin (NEURONTIN) 300 MG capsule  (Patient not taking: Reported on 11/14/2023)       No current facility-administered medications for this visit.       Mental Status Exam:   Hygiene:   good  Cooperation:  Cooperative  Eye Contact:  Good  Psychomotor Behavior:  Appropriate  Affect:  Appropriate  Mood: normal  Speech:  Normal  Thought Process:  Goal directed and Linear  Thought Content:  Normal and Mood congruent  Suicidal:  None  Homicidal:  None  Hallucinations:  None  Delusion:  None  Memory:  Intact  Orientation:  Person, Place, Time, and Situation  Reliability:  good  Insight:  Good  Judgement:  Good  Impulse Control:  Good  Physical/Medical Issues:  No      Objective   Vital Signs:   Ht 165.1 cm (65\")   Wt 72.1 kg (159 lb)   BMI 26.46 kg/m²       Assessment / Plan    Diagnoses and all orders for this visit:    1. Bipolar II disorder (Primary)  -     buPROPion XL (WELLBUTRIN XL) 150 MG 24 hr tablet; Take 1 tablet by mouth Every Morning.  Dispense: 90 tablet; Refill: 0  -     citalopram (CeleXA) 20 MG tablet; Take 1 tablet by mouth Daily.  Dispense: 90 tablet; Refill: 0  -     lamoTRIgine (LaMICtal) 200 MG tablet; Take 1 tablet by mouth 2 (Two) Times a Day.  Dispense: 180 " tablet; Refill: 0  -     topiramate (TOPAMAX) 100 MG tablet; Take 1 tablet by mouth 2 (Two) Times a Day.  Dispense: 180 tablet; Refill: 0    2. Encounter for therapeutic drug monitoring  -     Compliance Drug Analysis, Ur - Urine, Clean Catch    3. Generalized anxiety disorder  -     buPROPion XL (WELLBUTRIN XL) 150 MG 24 hr tablet; Take 1 tablet by mouth Every Morning.  Dispense: 90 tablet; Refill: 0  -     busPIRone (BUSPAR) 10 MG tablet; Take 1 tablet by mouth 3 (Three) Times a Day.  Dispense: 270 tablet; Refill: 0  -     citalopram (CeleXA) 20 MG tablet; Take 1 tablet by mouth Daily.  Dispense: 90 tablet; Refill: 0  -     hydrOXYzine (ATARAX) 50 MG tablet; Take 1 tablet by mouth Every Night.  Dispense: 90 tablet; Refill: 0  -     topiramate (TOPAMAX) 100 MG tablet; Take 1 tablet by mouth 2 (Two) Times a Day.  Dispense: 180 tablet; Refill: 0    4. Psychophysiological insomnia  -     hydrOXYzine (ATARAX) 50 MG tablet; Take 1 tablet by mouth Every Night.  Dispense: 90 tablet; Refill: 0    5. Attention deficit hyperactivity disorder, combined type   - Continue Concerta 36mg    Patient appears to be doing well on current medication. No issues reported and no indication for change. Will continue medication as ordered. Obtained yearly urine drug screen and controlled substance agreement signed.     A psychological evaluation was conducted in order to assess past and current level of functioning. Areas assessed included, but were not limited to: perception of social support, perception of ability to face and deal with challenges in life (positive functioning), anxiety symptoms, depressive symptoms, perspective on beliefs/belief system, coping skills for stress, intelligence level,  Therapeutic rapport was established. Interventions conducted today were geared towards incorporating medication management along with support for continued therapy. Education was also provided as to the med management with this provider and  what to expect in subsequent sessions.      We discussed risks, benefits, goals and side effects of the above medication and the patient was agreeable with the plan. Patient was educated on the importance of compliance with treatment and follow-up appointments. Patient is aware to contact the [facility] with any worsening of symptoms. To call for questions or concerns and return early if necessary. Patent is agreeable to go to the Emergency Department or call 911 should they begin SI/HI.      PHQ-2/PHQ-9: Depression Screening  Little Interest or Pleasure in Doing Things: 1-->several days  Feeling Down, Depressed or Hopeless: 0-->not at all  PHQ-2 Total Score: 1  PHQ-9: Brief Depression Severity Measure Score: 1      PHQ-9 Score:   PHQ-9 Total Score: 1    Depression Screening:  Patient screened positive for depression based on a PHQ-9 score of 1 on 11/14/2023. Follow-up recommendations include: Prescribed antidepressant medication treatment and Suicide Risk Assessment performed.      Over the last two weeks, how often have you been bothered by the following problems?  Feeling nervous, anxious or on edge: Several days  Not being able to stop or control worrying: Several days  Worrying too much about different things: Several days  Trouble Relaxing: Several days  Being so restless that it is hard to sit still: Not at all  Becoming easily annoyed or irritable: Not at all  Feeling afraid as if something awful might happen: Several days  QUIN 7 Total Score: 5  If you checked any problems, how difficult have these problems made it for you to do your work, take care of things at home, or get along with other people: Not difficult at all        Screening for Adults With ADHD - (1-6)  1. How often do you have trouble wrapping up the final details of a project, once the challenging parts have been done?: Sometimes  2. How often do you have difficulty getting things in order when you have to do a task that requires  organization?: Sometimes  3. How often do you have problems remembering appointments or obligations : Often  4. When you have a task that requires a lot of thought, how often do you avoid or delay getting started ?: Often  5. How often do you fidget or squirm with your hands or feet when you have to sit down for a long time?: Sometimes  6. How often do you feel overly active and compelled to do things, like you were driven by a motor?: Rarely  7. How often do you make careless mistakes when you have to work on a boring or difficult project?: Sometimes  8. How often do have difficulty keeping your attention when you are doing boring or repetitive work?: Sometimes  9. How often do you have difficulty concentrating on what people say to you, even when they are speaking to you: Sometimes  10.How often do you misplace or have difficulty finding things at home or at work?: Sometimes  11.How often are you distracted by activity or noise around you?: Often  12.How often do you leave your seat in meetings or other situations in which you are expected to remain seated?: Rarely  13.How often do you feel restless or fidgety?: Sometimes  14.How often do you have difficulty unwinding and relaxing when you have time to yourself?: Sometimes  15.How often do you find yourself talking too much when you are in social situations?: Sometimes  16.When you’re in a conversation, how often do you find yourself finishing the sentences of the people you are talking to, before they can finish them themselves?: Sometimes  17.How often do you have difficulty waiting your turn in situations when turn taking is required?: Sometimes  18.How often do you interrupt others when they are busy?: Sometimes        MEDS ORDERED DURING VISIT:  New Medications Ordered This Visit   Medications    buPROPion XL (WELLBUTRIN XL) 150 MG 24 hr tablet     Sig: Take 1 tablet by mouth Every Morning.     Dispense:  90 tablet     Refill:  0    busPIRone (BUSPAR) 10 MG  tablet     Sig: Take 1 tablet by mouth 3 (Three) Times a Day.     Dispense:  270 tablet     Refill:  0    citalopram (CeleXA) 20 MG tablet     Sig: Take 1 tablet by mouth Daily.     Dispense:  90 tablet     Refill:  0    hydrOXYzine (ATARAX) 50 MG tablet     Sig: Take 1 tablet by mouth Every Night.     Dispense:  90 tablet     Refill:  0    lamoTRIgine (LaMICtal) 200 MG tablet     Sig: Take 1 tablet by mouth 2 (Two) Times a Day.     Dispense:  180 tablet     Refill:  0    topiramate (TOPAMAX) 100 MG tablet     Sig: Take 1 tablet by mouth 2 (Two) Times a Day.     Dispense:  180 tablet     Refill:  0         Follow Up   Return in about 3 months (around 2/14/2024).  Patient was given instructions and counseling regarding her condition or for health maintenance advice. Please see specific information pulled into the AVS if appropriate.     TREATMENT PLAN/GOALS: Continue supportive psychotherapy efforts and medications as indicated. Treatment and medication options discussed during today's visit. Patient acknowledged and verbally consented to continue with current treatment plan and was educated on the importance of compliance with treatment and follow-up appointments.    MEDICATION ISSUES:  Discussed medication options and treatment plan of prescribed medication as well as the risks, benefits, and side effects including potential falls, possible impaired driving and metabolic adversities among others. Patient is agreeable to call the office with any worsening of symptoms or onset of side effects. Patient is agreeable to call 911 or go to the nearest ER should he/she begin having SI/HI.        [facility]  [address] [place] [id number] [phone]    November 14, 2023 10:43 EST

## 2023-11-14 NOTE — TELEPHONE ENCOUNTER
Incoming Refill Request      Medication requested (name and dose): CONCERTA 36MG    Pharmacy where request should be sent: [facility]    Additional details provided by patient: N/A    Best call back number: [phone]    Does the patient have less than a 3 day supply:  [] Yes  [x] No    Lacey Bell  11/14/23, 12:15 EST

## 2023-11-21 LAB — DRUGS UR: NORMAL

## 2023-12-11 DIAGNOSIS — F51.04 PSYCHOPHYSIOLOGICAL INSOMNIA: ICD-10-CM

## 2023-12-11 RX ORDER — ZOLPIDEM TARTRATE 10 MG/1
10 TABLET ORAL NIGHTLY PRN
Qty: 30 TABLET | Refills: 1 | Status: SHIPPED | OUTPATIENT
Start: 2023-12-11

## 2023-12-11 NOTE — TELEPHONE ENCOUNTER
Incoming Refill Request      [facility]     Pharm[facility] where request should be sent: [facility]     Additional details provided by patient: N/A    Best call back number: [phone]    Does the patient have less than a 3 day supply:  [x] Yes  [] No    Micaela Burrows  12/11/23, 09:39 EST

## 2023-12-11 NOTE — TELEPHONE ENCOUNTER
Rx Refill Note  Requested Prescriptions     Pending Prescriptions Disp Refills    zolpidem (AMBIEN) 10 MG tablet 30 tablet 1     Sig: Take 1 tablet by mouth At Night As Needed for Sleep.      Last office visit with prescribing clinician: 11/14/2023   Last telemedicine visit with prescribing clinician: Visit date not found   Next office visit with prescribing clinician: 2/14/2024                         Would you like a call back once the refill request has been completed: [] Yes [] No    If the office needs to give you a call back, can they leave a voicemail: [] Yes [] No    Rhonda Cuevas MA  12/11/23, 10:19 EST

## 2023-12-18 DIAGNOSIS — F90.2 ATTENTION DEFICIT HYPERACTIVITY DISORDER, COMBINED TYPE: ICD-10-CM

## 2023-12-18 RX ORDER — METHYLPHENIDATE HYDROCHLORIDE 36 MG/1
36 TABLET ORAL DAILY
Qty: 30 TABLET | Refills: 0 | Status: SHIPPED | OUTPATIENT
Start: 2023-12-18

## 2023-12-18 NOTE — TELEPHONE ENCOUNTER
Rx Refill Note  Requested Prescriptions     Pending Prescriptions Disp Refills    methylphenidate (Concerta) 36 MG CR tablet 30 tablet 0     Sig: Take 1 tablet by mouth Daily      Last office visit with prescribing clinician: 11/14/2023   Last telemedicine visit with prescribing clinician: Visit date not found   Next office visit with prescribing clinician: 2/14/2024                         Would you like a call back once the refill request has been completed: [] Yes [] No    If the office needs to give you a call back, can they leave a voicemail: [] Yes [] No    Rhonda Cuevas MA  12/18/23, 13:22 EST

## 2023-12-18 NOTE — TELEPHONE ENCOUNTER
Incoming Refill Request      Medication requested (name and dose): CONCERTA 36MG    Pharmacy where request should be sent: [facility]    Additional details provided by patient: PT stated that she only has todays dose and then she will be out.    Best call back number: [phone]    Does the patient have less than a 3 day supply:  [x] Yes  [] No    Krystyna Manriquez Rep  12/18/23, 12:00 EST

## 2023-12-19 DIAGNOSIS — F41.1 GENERALIZED ANXIETY DISORDER: ICD-10-CM

## 2023-12-19 DIAGNOSIS — F31.81 BIPOLAR II DISORDER: ICD-10-CM

## 2023-12-29 ENCOUNTER — TRANSCRIBE ORDERS (OUTPATIENT)
Dept: ADMINISTRATIVE | Facility: HOSPITAL | Age: 58
End: 2023-12-29
Payer: MEDICARE

## 2023-12-29 DIAGNOSIS — E04.1 THYROID NODULE: Primary | ICD-10-CM

## 2024-01-15 DIAGNOSIS — F90.2 ATTENTION DEFICIT HYPERACTIVITY DISORDER, COMBINED TYPE: ICD-10-CM

## 2024-01-15 RX ORDER — METHYLPHENIDATE HYDROCHLORIDE 36 MG/1
36 TABLET ORAL DAILY
Qty: 30 TABLET | Refills: 0 | Status: CANCELLED | OUTPATIENT
Start: 2024-01-15

## 2024-01-16 DIAGNOSIS — F90.2 ATTENTION DEFICIT HYPERACTIVITY DISORDER, COMBINED TYPE: ICD-10-CM

## 2024-01-16 RX ORDER — METHYLPHENIDATE HYDROCHLORIDE 36 MG/1
36 TABLET ORAL DAILY
Qty: 30 TABLET | Refills: 0 | Status: SHIPPED | OUTPATIENT
Start: 2024-01-16

## 2024-01-16 NOTE — TELEPHONE ENCOUNTER
Incoming Refill Request      Medication requested (name and dose): CONCERTA 36 MG     Pharmacy where request should be sent: [facility]     Additional details provided by patient: N/A    Best call back number: [phone]    Does the patient have less than a 3 day supply:  [x] Yes  [] No    Krystyna Renae Rep  01/16/24, 12:00 EST

## 2024-01-24 ENCOUNTER — PRIOR AUTHORIZATION (OUTPATIENT)
Dept: BEHAVIORAL HEALTH | Facility: CLINIC | Age: 59
End: 2024-01-24
Payer: MEDICARE

## 2024-01-24 ENCOUNTER — TELEPHONE (OUTPATIENT)
Dept: BEHAVIORAL HEALTH | Facility: CLINIC | Age: 59
End: 2024-01-24
Payer: MEDICARE

## 2024-01-24 NOTE — TELEPHONE ENCOUNTER
Prior authorization for Concerta sent to insurance. Awaiting response.    Key: [id number]    Prior authorization approved.  Effective 1/1/2024 to 12/31/2024

## 2024-01-26 ENCOUNTER — HOSPITAL ENCOUNTER (OUTPATIENT)
Dept: ULTRASOUND IMAGING | Facility: HOSPITAL | Age: 59
Discharge: HOME | End: 2024-01-26
Admitting: FAMILY MEDICINE
Payer: MEDICARE

## 2024-01-26 DIAGNOSIS — E04.1 THYROID NODULE: ICD-10-CM

## 2024-01-26 PROCEDURE — 76536 US EXAM OF HEAD AND NECK: CPT

## 2024-01-29 ENCOUNTER — TELEPHONE (OUTPATIENT)
Dept: BEHAVIORAL HEALTH | Facility: CLINIC | Age: 59
End: 2024-01-29
Payer: MEDICARE

## 2024-01-29 NOTE — TELEPHONE ENCOUNTER
PATIENT CALLED AND HAS MOVED TO [place] SO NEEDS HER AMBIEN PRESCRIPTION SENT TO [facility] IN [place]. THEY WON'T TRANSFER IT.

## 2024-01-31 DIAGNOSIS — F51.04 PSYCHOPHYSIOLOGICAL INSOMNIA: ICD-10-CM

## 2024-01-31 RX ORDER — ZOLPIDEM TARTRATE 10 MG/1
10 TABLET ORAL NIGHTLY PRN
Qty: 30 TABLET | Refills: 1 | Status: SHIPPED | OUTPATIENT
Start: 2024-01-31

## 2024-01-31 NOTE — TELEPHONE ENCOUNTER
PATIENT HAS CALLED TO GET HER AMBIEN REFILLED SHE HAS MOVED TO [place] AND NEEDS IT SENT TO [facility] IN [place] AND HAVE DUY CALL HER TO LET HER KNOW IT HAS BEEN SENT IN.

## 2024-02-14 ENCOUNTER — TELEMEDICINE (OUTPATIENT)
Dept: BEHAVIORAL HEALTH | Facility: CLINIC | Age: 59
End: 2024-02-14
Payer: MEDICARE

## 2024-02-14 ENCOUNTER — TELEPHONE (OUTPATIENT)
Dept: BEHAVIORAL HEALTH | Facility: CLINIC | Age: 59
End: 2024-02-14

## 2024-02-14 DIAGNOSIS — F41.1 GENERALIZED ANXIETY DISORDER: ICD-10-CM

## 2024-02-14 DIAGNOSIS — F31.81 BIPOLAR II DISORDER: Primary | ICD-10-CM

## 2024-02-14 DIAGNOSIS — F51.04 PSYCHOPHYSIOLOGICAL INSOMNIA: ICD-10-CM

## 2024-02-14 DIAGNOSIS — F90.2 ATTENTION DEFICIT HYPERACTIVITY DISORDER, COMBINED TYPE: ICD-10-CM

## 2024-02-14 RX ORDER — METHYLPHENIDATE HYDROCHLORIDE 36 MG/1
36 TABLET ORAL DAILY
Qty: 30 TABLET | Refills: 0 | Status: SHIPPED | OUTPATIENT
Start: 2024-02-14

## 2024-02-14 RX ORDER — TOPIRAMATE 100 MG/1
100 TABLET, FILM COATED ORAL 2 TIMES DAILY
Qty: 180 TABLET | Refills: 0 | Status: SHIPPED | OUTPATIENT
Start: 2024-02-14

## 2024-02-14 RX ORDER — LAMOTRIGINE 200 MG/1
200 TABLET ORAL 2 TIMES DAILY
Qty: 180 TABLET | Refills: 0 | Status: SHIPPED | OUTPATIENT
Start: 2024-02-14

## 2024-02-14 RX ORDER — BUSPIRONE HYDROCHLORIDE 10 MG/1
10 TABLET ORAL 3 TIMES DAILY
Qty: 270 TABLET | Refills: 0 | Status: SHIPPED | OUTPATIENT
Start: 2024-02-14

## 2024-02-14 RX ORDER — BUPROPION HYDROCHLORIDE 150 MG/1
150 TABLET ORAL EVERY MORNING
Qty: 90 TABLET | Refills: 0 | Status: SHIPPED | OUTPATIENT
Start: 2024-02-14

## 2024-02-14 RX ORDER — CITALOPRAM 20 MG/1
20 TABLET ORAL DAILY
Qty: 90 TABLET | Refills: 3 | OUTPATIENT
Start: 2024-02-14

## 2024-02-14 RX ORDER — CITALOPRAM 20 MG/1
20 TABLET ORAL DAILY
Qty: 90 TABLET | Refills: 0 | Status: SHIPPED | OUTPATIENT
Start: 2024-02-14

## 2024-02-14 RX ORDER — HYDROXYZINE 50 MG/1
50 TABLET, FILM COATED ORAL NIGHTLY
Qty: 90 TABLET | Refills: 0 | Status: SHIPPED | OUTPATIENT
Start: 2024-02-14

## 2024-02-24 DIAGNOSIS — F41.1 GENERALIZED ANXIETY DISORDER: ICD-10-CM

## 2024-02-24 DIAGNOSIS — F31.81 BIPOLAR II DISORDER: ICD-10-CM

## 2024-02-26 ENCOUNTER — TELEPHONE (OUTPATIENT)
Dept: BEHAVIORAL HEALTH | Facility: CLINIC | Age: 59
End: 2024-02-26
Payer: MEDICARE

## 2024-02-26 RX ORDER — CITALOPRAM 20 MG/1
20 TABLET ORAL DAILY
Qty: 90 TABLET | Refills: 3 | OUTPATIENT
Start: 2024-02-26

## 2024-02-26 NOTE — TELEPHONE ENCOUNTER
PT IS CALLING ABOUT THE MEDICATION CITALOPRAM. SHE RECEIVED AN AUTOMATED MESSAGE SAYING HER PRESCRIPTION WON'T BE REFILLED.

## 2024-03-03 DIAGNOSIS — F41.1 GENERALIZED ANXIETY DISORDER: ICD-10-CM

## 2024-03-03 DIAGNOSIS — F31.81 BIPOLAR II DISORDER: ICD-10-CM

## 2024-03-04 RX ORDER — TOPIRAMATE 100 MG/1
100 TABLET, FILM COATED ORAL 2 TIMES DAILY
Qty: 180 TABLET | Refills: 3 | Status: SHIPPED | OUTPATIENT
Start: 2024-03-04

## 2024-03-18 DIAGNOSIS — F90.2 ATTENTION DEFICIT HYPERACTIVITY DISORDER, COMBINED TYPE: ICD-10-CM

## 2024-03-18 DIAGNOSIS — F51.04 PSYCHOPHYSIOLOGICAL INSOMNIA: ICD-10-CM

## 2024-03-18 RX ORDER — ZOLPIDEM TARTRATE 10 MG/1
10 TABLET ORAL NIGHTLY PRN
Qty: 30 TABLET | Refills: 1 | Status: SHIPPED | OUTPATIENT
Start: 2024-03-18

## 2024-03-18 RX ORDER — METHYLPHENIDATE HYDROCHLORIDE 54 MG/1
54 TABLET ORAL EVERY MORNING
Qty: 30 TABLET | Refills: 0 | Status: SHIPPED | OUTPATIENT
Start: 2024-03-18

## 2024-03-18 RX ORDER — METHYLPHENIDATE HYDROCHLORIDE 36 MG/1
36 TABLET ORAL DAILY
Qty: 30 TABLET | Refills: 0 | Status: CANCELLED | OUTPATIENT
Start: 2024-03-18

## 2024-03-18 NOTE — TELEPHONE ENCOUNTER
Patient called and says she is having trouble focusing, making simple decisions, and staying on task/getting things done. Seems to be getting worse the last couple months. She wondered if her Concerta needs to be increased. She asked if she can get a call back.     I put in a refill on both meds also (different pharmacies).

## 2024-04-15 DIAGNOSIS — F90.2 ATTENTION DEFICIT HYPERACTIVITY DISORDER, COMBINED TYPE: ICD-10-CM

## 2024-04-15 RX ORDER — METHYLPHENIDATE HYDROCHLORIDE 54 MG/1
54 TABLET ORAL EVERY MORNING
Qty: 30 TABLET | Refills: 0 | Status: SHIPPED | OUTPATIENT
Start: 2024-04-15

## 2024-04-15 NOTE — TELEPHONE ENCOUNTER
Rx Refill Note  Requested Prescriptions     Pending Prescriptions Disp Refills    methylphenidate (Concerta) 54 MG CR tablet 30 tablet 0     Sig: Take 1 tablet by mouth Every Morning      Last office visit with prescribing clinician: 11/14/2023   Last telemedicine visit with prescribing clinician: Visit date not found   Next office visit with prescribing clinician: 5/13/2024                         Would you like a call back once the refill request has been completed: [] Yes [] No    If the office needs to give you a call back, can they leave a voicemail: [] Yes [] No    Rhonda Cuevas MA  04/15/24, 14:09 EDT

## 2024-04-15 NOTE — TELEPHONE ENCOUNTER
Incoming Refill Request      Medication requested (name and dose): METHYLPHENIDATE 54 MG CR TABLET    Pharmacy where request should be sent: [facility]    Additional details provided by patient: HAS 4 DAYS LEFT    Best call back number: [phone]    Does the patient have less than a 3 day supply:  [] Yes  [x] No    Lacey Bell  04/15/24, 10:17 EDT

## 2024-04-29 DIAGNOSIS — F51.04 PSYCHOPHYSIOLOGICAL INSOMNIA: ICD-10-CM

## 2024-04-29 RX ORDER — ZOLPIDEM TARTRATE 10 MG/1
10 TABLET ORAL NIGHTLY PRN
Qty: 30 TABLET | Refills: 1 | Status: SHIPPED | OUTPATIENT
Start: 2024-04-29

## 2024-04-29 NOTE — TELEPHONE ENCOUNTER
Rx Refill Note  Requested Prescriptions     Pending Prescriptions Disp Refills    zolpidem (AMBIEN) 10 MG tablet 30 tablet 1     Sig: Take 1 tablet by mouth At Night As Needed for Sleep.      Last office visit with prescribing clinician: 11/14/2023   Last telemedicine visit with prescribing clinician: 2/14/2024   Next office visit with prescribing clinician: 5/13/2024                         Would you like a call back once the refill request has been completed: [] Yes [] No    If the office needs to give you a call back, can they leave a voicemail: [] Yes [] No    Rhonda Cuevas MA  04/29/24, 11:00 EDT

## 2024-05-01 DIAGNOSIS — F41.1 GENERALIZED ANXIETY DISORDER: ICD-10-CM

## 2024-05-01 DIAGNOSIS — F51.04 PSYCHOPHYSIOLOGICAL INSOMNIA: ICD-10-CM

## 2024-05-02 DIAGNOSIS — F41.1 GENERALIZED ANXIETY DISORDER: ICD-10-CM

## 2024-05-10 DIAGNOSIS — F41.1 GENERALIZED ANXIETY DISORDER: ICD-10-CM

## 2024-05-10 DIAGNOSIS — F31.81 BIPOLAR II DISORDER: ICD-10-CM

## 2024-05-13 ENCOUNTER — TELEMEDICINE (OUTPATIENT)
Dept: BEHAVIORAL HEALTH | Facility: CLINIC | Age: 59
End: 2024-05-13
Payer: MEDICARE

## 2024-05-13 DIAGNOSIS — F31.81 BIPOLAR II DISORDER: ICD-10-CM

## 2024-05-13 DIAGNOSIS — F90.2 ATTENTION DEFICIT HYPERACTIVITY DISORDER, COMBINED TYPE: Primary | ICD-10-CM

## 2024-05-13 DIAGNOSIS — F41.1 GENERALIZED ANXIETY DISORDER: ICD-10-CM

## 2024-05-13 DIAGNOSIS — F51.04 PSYCHOPHYSIOLOGICAL INSOMNIA: ICD-10-CM

## 2024-05-13 PROCEDURE — 1159F MED LIST DOCD IN RCRD: CPT

## 2024-05-13 PROCEDURE — 1160F RVW MEDS BY RX/DR IN RCRD: CPT

## 2024-05-13 PROCEDURE — 99214 OFFICE O/P EST MOD 30 MIN: CPT

## 2024-05-13 RX ORDER — BUSPIRONE HYDROCHLORIDE 10 MG/1
10 TABLET ORAL 3 TIMES DAILY
Qty: 270 TABLET | Refills: 0 | Status: SHIPPED | OUTPATIENT
Start: 2024-05-13

## 2024-05-13 RX ORDER — CITALOPRAM 20 MG/1
20 TABLET ORAL DAILY
Qty: 90 TABLET | Refills: 3 | OUTPATIENT
Start: 2024-05-13

## 2024-05-13 RX ORDER — HYDROXYZINE 50 MG/1
50 TABLET, FILM COATED ORAL NIGHTLY
Qty: 90 TABLET | Refills: 0 | Status: SHIPPED | OUTPATIENT
Start: 2024-05-13

## 2024-05-13 RX ORDER — CITALOPRAM 20 MG/1
20 TABLET ORAL DAILY
Qty: 90 TABLET | Refills: 0 | Status: SHIPPED | OUTPATIENT
Start: 2024-05-13

## 2024-05-13 RX ORDER — HYDROXYZINE 50 MG/1
50 TABLET, FILM COATED ORAL NIGHTLY
Qty: 90 TABLET | Refills: 3 | OUTPATIENT
Start: 2024-05-13

## 2024-05-13 RX ORDER — BUPROPION HYDROCHLORIDE 150 MG/1
150 TABLET ORAL EVERY MORNING
Qty: 90 TABLET | Refills: 0 | Status: SHIPPED | OUTPATIENT
Start: 2024-05-13

## 2024-05-13 RX ORDER — BUSPIRONE HYDROCHLORIDE 10 MG/1
10 TABLET ORAL 3 TIMES DAILY
Qty: 270 TABLET | Refills: 3 | OUTPATIENT
Start: 2024-05-13

## 2024-05-13 RX ORDER — LAMOTRIGINE 200 MG/1
200 TABLET ORAL 2 TIMES DAILY
Qty: 180 TABLET | Refills: 0 | Status: SHIPPED | OUTPATIENT
Start: 2024-05-13

## 2024-05-13 NOTE — PROGRESS NOTES
This provider is located at [facility], Behavioral Health, [address] [place] [address], [id number],using a secure MyChart Video Visit through [employer]. Patient is being seen remotely via telehealth at their home address in [place], and stated they are in a secure environment for this session. The patient's condition being diagnosed/treated is appropriate for telemedicine. The provider identified herself as well as her credentials.   The patient, and/or patients guardian, consent to be seen remotely, and when consent is given they understand that the consent allows for patient identifiable information to be sent to a third party as needed.   They may refuse to be seen remotely at any time. The electronic data is encrypted and password protected, and the patient and/or guardian has been advised of the potential risks to privacy not withstanding such measures.    Video Visit    Patient Name: Eleanor Preston  : 1965   MRN: [id number]   Care Team: Patient Care Team:  Maritza Cheng DO as PCP - General (Family Medicine)  Lia Marroquin APRN as Nurse Practitioner (Behavioral Health)         Chief Complaint:    Chief Complaint   Patient presents with    ADHD    Anxiety    Bipolar    Sleeping Problem    Med Management       History of Present Illness: Eleanor Preston is a 59 y.o. female who presents via video visit for a medication management follow up. Patient reports that overall medication continues to be effective and she is doing really well. She feels that her focus and concentration are doing good and her mood and anxiety have been managed well. She also reports that she is sleeping good. She does endorse some situational stressors, mostly with her physical health, however she feels that she is managing it all very well. She did not see the need to make any changes and did not have any medication concerns at today's visit. She denies SI/HI today.     The following portion of the  patient's history were reviewed and updated appropriately: allergies, current and past medications, family history, medical history and social history. KIRSTY reviewed and appropriate.   Subjective   Review of Systems:    Review of Systems   Respiratory: Negative.     Cardiovascular: Negative.  Negative for chest pain and palpitations.   Neurological: Negative.    Psychiatric/Behavioral:  Positive for stress.    All other systems reviewed and are negative.      Current Medications:   Current Outpatient Medications   Medication Sig Dispense Refill    buPROPion XL (WELLBUTRIN XL) 150 MG 24 hr tablet Take 1 tablet by mouth Every Morning. 90 tablet 0    busPIRone (BUSPAR) 10 MG tablet Take 1 tablet by mouth 3 (Three) Times a Day. 270 tablet 0    citalopram (CeleXA) 20 MG tablet Take 1 tablet by mouth Daily. 90 tablet 0    hydrOXYzine (ATARAX) 50 MG tablet Take 1 tablet by mouth Every Night. 90 tablet 0    lamoTRIgine (LaMICtal) 200 MG tablet Take 1 tablet by mouth 2 (Two) Times a Day. 180 tablet 0    estradiol (ESTRACE) 2 MG tablet       methylphenidate (Concerta) 54 MG CR tablet Take 1 tablet by mouth Every Morning 30 tablet 0    multivitamin with minerals tablet tablet       rOPINIRole (REQUIP) 0.5 MG tablet TAKE 2 TABLETS BY MOUTH 1 TO 3 HOURS BEFORE BEDTIME      topiramate (TOPAMAX) 100 MG tablet TAKE 1 TABLET TWICE DAILY 180 tablet 3    valACYclovir (VALTREX) 500 MG tablet Take 1 tablet by mouth Every 12 (Twelve) Hours.      zolpidem (AMBIEN) 10 MG tablet Take 1 tablet by mouth At Night As Needed for Sleep. 30 tablet 1     No current facility-administered medications for this visit.       Mental Status Exam:   Hygiene:    unable to assess   Cooperation:  Cooperative  Eye Contact:  Good  Psychomotor Behavior:   appears to be WNL   Affect:  Appropriate  Mood: normal  Speech:  Normal  Thought Process:  Goal directed and Linear  Thought Content:  Normal and Mood congruent  Suicidal:  None  Homicidal:   None  Hallucinations:  None  Delusion:  None  Memory:  Intact  Orientation:  Person, Place, Time, and Situation  Reliability:  good  Insight:  Good  Judgement:  Good  Impulse Control:  Good  Physical/Medical Issues:  Yes see chart       Objective   Vital Signs:   There were no vitals taken for this visit.      Assessment / Plan    Diagnoses and all orders for this visit:    1. Attention deficit hyperactivity disorder, combined type (Primary)    2. Generalized anxiety disorder  -     buPROPion XL (WELLBUTRIN XL) 150 MG 24 hr tablet; Take 1 tablet by mouth Every Morning.  Dispense: 90 tablet; Refill: 0  -     busPIRone (BUSPAR) 10 MG tablet; Take 1 tablet by mouth 3 (Three) Times a Day.  Dispense: 270 tablet; Refill: 0  -     citalopram (CeleXA) 20 MG tablet; Take 1 tablet by mouth Daily.  Dispense: 90 tablet; Refill: 0  -     hydrOXYzine (ATARAX) 50 MG tablet; Take 1 tablet by mouth Every Night.  Dispense: 90 tablet; Refill: 0    3. Bipolar II disorder  -     buPROPion XL (WELLBUTRIN XL) 150 MG 24 hr tablet; Take 1 tablet by mouth Every Morning.  Dispense: 90 tablet; Refill: 0  -     citalopram (CeleXA) 20 MG tablet; Take 1 tablet by mouth Daily.  Dispense: 90 tablet; Refill: 0  -     lamoTRIgine (LaMICtal) 200 MG tablet; Take 1 tablet by mouth 2 (Two) Times a Day.  Dispense: 180 tablet; Refill: 0    4. Psychophysiological insomnia  -     hydrOXYzine (ATARAX) 50 MG tablet; Take 1 tablet by mouth Every Night.  Dispense: 90 tablet; Refill: 0    Patient appears to be doing well on current medication. No issues reported and no indication for change. Will continue medication as ordered.     As part of patient's treatment plan I am prescribing a controlled substance.  The patient has been made aware of the appropriate use of such medications, including potential risk of somnolence, limited ability to drive and/or work safely, and potential for dependence and/or overdose.  It has also been made clear that these medications  are for use by this patient only, without concomitant use of alcohol or other substances, unless prescribed.    Patient has completed a prescribing agreement detailing terms of continued prescribing of controlled substances, including monitoring KIRSTY reports, urine drug screening, and pill counts if necessary.  Patient is aware that inappropriate use will result in cessation of prescribing such medications      A psychological evaluation was conducted in order to assess past and current level of functioning. Areas assessed included, but were not limited to: perception of social support, perception of ability to face and deal with challenges in life (positive functioning), anxiety symptoms, depressive symptoms, perspective on beliefs/belief system, coping skills for stress, intelligence level,  Therapeutic rapport was established. Interventions conducted today were geared towards incorporating medication management along with support for continued therapy. Education was also provided as to the med management with this provider and what to expect in subsequent sessions.      We discussed risks, benefits, goals and side effects of the above medication and the patient was agreeable with the plan. Patient was educated on the importance of compliance with treatment and follow-up appointments. Patient is aware to contact the [facility] with any worsening of symptoms. To call for questions or concerns and return early if necessary. Patent is agreeable to go to the Emergency Department or call 911 should they begin SI/HI.        MEDS ORDERED DURING VISIT:  New Medications Ordered This Visit   Medications    buPROPion XL (WELLBUTRIN XL) 150 MG 24 hr tablet     Sig: Take 1 tablet by mouth Every Morning.     Dispense:  90 tablet     Refill:  0    busPIRone (BUSPAR) 10 MG tablet     Sig: Take 1 tablet by mouth 3 (Three) Times a Day.     Dispense:  270 tablet     Refill:  0    citalopram (CeleXA) 20 MG tablet     Sig: Take  1 tablet by mouth Daily.     Dispense:  90 tablet     Refill:  0    hydrOXYzine (ATARAX) 50 MG tablet     Sig: Take 1 tablet by mouth Every Night.     Dispense:  90 tablet     Refill:  0    lamoTRIgine (LaMICtal) 200 MG tablet     Sig: Take 1 tablet by mouth 2 (Two) Times a Day.     Dispense:  180 tablet     Refill:  0         Follow Up   Return in about 3 months (around 8/13/2024).  Patient was given instructions and counseling regarding her condition or for health maintenance advice. Please see specific information pulled into the AVS if appropriate.     TREATMENT PLAN/GOALS: Continue supportive psychotherapy efforts and medications as indicated. Treatment and medication options discussed during today's visit. Patient acknowledged and verbally consented to continue with current treatment plan and was educated on the importance of compliance with treatment and follow-up appointments.    MEDICATION ISSUES:  Discussed medication options and treatment plan of prescribed medication as well as the risks, benefits, and side effects including potential falls, possible impaired driving and metabolic adversities among others. Patient is agreeable to call the office with any worsening of symptoms or onset of side effects. Patient is agreeable to call 911 or go to the nearest ER should he/she begin having SI/HI.        [facility]  [address] [place] [address] DR RACHID LOW [address] [phone]    May 13, 2024 16:00 EDT

## 2024-05-21 DIAGNOSIS — F90.2 ATTENTION DEFICIT HYPERACTIVITY DISORDER, COMBINED TYPE: ICD-10-CM

## 2024-05-21 RX ORDER — METHYLPHENIDATE HYDROCHLORIDE 54 MG/1
54 TABLET ORAL EVERY MORNING
Qty: 30 TABLET | Refills: 0 | Status: SHIPPED | OUTPATIENT
Start: 2024-05-21

## 2024-05-24 ENCOUNTER — HOSPITAL ENCOUNTER (EMERGENCY)
Facility: HOSPITAL | Age: 59
Discharge: HOME | End: 2024-05-24
Attending: STUDENT IN AN ORGANIZED HEALTH CARE EDUCATION/TRAINING PROGRAM
Payer: MEDICARE

## 2024-05-24 ENCOUNTER — APPOINTMENT (OUTPATIENT)
Dept: CT IMAGING | Facility: HOSPITAL | Age: 59
End: 2024-05-24
Payer: MEDICARE

## 2024-05-24 VITALS
HEIGHT: 65 IN | OXYGEN SATURATION: 99 % | WEIGHT: 160 LBS | RESPIRATION RATE: 18 BRPM | DIASTOLIC BLOOD PRESSURE: 86 MMHG | HEART RATE: 64 BPM | SYSTOLIC BLOOD PRESSURE: 139 MMHG | TEMPERATURE: 98 F | BODY MASS INDEX: 26.66 KG/M2

## 2024-05-24 DIAGNOSIS — M54.40 LOW BACK PAIN WITH SCIATICA, SCIATICA LATERALITY UNSPECIFIED, UNSPECIFIED BACK PAIN LATERALITY, UNSPECIFIED CHRONICITY: Primary | ICD-10-CM

## 2024-05-24 DIAGNOSIS — M51.36 BULGING LUMBAR DISC: ICD-10-CM

## 2024-05-24 LAB
ALBUMIN SERPL-MCNC: 3.9 G/DL (ref 3.5–5.2)
ALBUMIN/GLOB SERPL: 1.7 G/DL
ALP SERPL-CCNC: 47 U/L (ref 39–117)
ALT SERPL W P-5'-P-CCNC: 12 U/L (ref 1–33)
ANION GAP SERPL CALCULATED.3IONS-SCNC: 7.2 MMOL/L (ref 5–15)
AST SERPL-CCNC: 16 U/L (ref 1–32)
BASOPHILS # BLD AUTO: 0.02 10*3/MM3 (ref 0–0.2)
BASOPHILS NFR BLD AUTO: 0.4 % (ref 0–1.5)
BILIRUB SERPL-MCNC: 0.3 MG/DL (ref 0–1.2)
BILIRUB UR QL STRIP: NEGATIVE
BUN SERPL-MCNC: 16 MG/DL (ref 6–20)
BUN/CREAT SERPL: 16.3 (ref 7–25)
CALCIUM SPEC-SCNC: 8.8 MG/DL (ref 8.6–10.5)
CHLORIDE SERPL-SCNC: 108 MMOL/L (ref 98–107)
CLARITY UR: CLEAR
CO2 SERPL-SCNC: 24.8 MMOL/L (ref 22–29)
COLOR UR: YELLOW
CREAT SERPL-MCNC: 0.98 MG/DL (ref 0.57–1)
DEPRECATED RDW RBC AUTO: 45 FL (ref 37–54)
EGFRCR SERPLBLD CKD-EPI 2021: 67 ML/MIN/1.73
EOSINOPHIL # BLD AUTO: 0.07 10*3/MM3 (ref 0–0.4)
EOSINOPHIL NFR BLD AUTO: 1.3 % (ref 0.3–6.2)
ERYTHROCYTE [DISTWIDTH] IN BLOOD BY AUTOMATED COUNT: 13 % (ref 12.3–15.4)
GLOBULIN UR ELPH-MCNC: 2.3 GM/DL
GLUCOSE SERPL-MCNC: 95 MG/DL (ref 65–99)
GLUCOSE UR STRIP-MCNC: NEGATIVE MG/DL
HCT VFR BLD AUTO: 41.8 % (ref 34–46.6)
HGB BLD-MCNC: 14.1 G/DL (ref 12–15.9)
HGB UR QL STRIP.AUTO: NEGATIVE
IMM GRANULOCYTES # BLD AUTO: 0.01 10*3/MM3 (ref 0–0.05)
IMM GRANULOCYTES NFR BLD AUTO: 0.2 % (ref 0–0.5)
KETONES UR QL STRIP: NEGATIVE
LEUKOCYTE ESTERASE UR QL STRIP.AUTO: NEGATIVE
LYMPHOCYTES # BLD AUTO: 1.52 10*3/MM3 (ref 0.7–3.1)
LYMPHOCYTES NFR BLD AUTO: 28 % (ref 19.6–45.3)
MCH RBC QN AUTO: 31.6 PG (ref 26.6–33)
MCHC RBC AUTO-ENTMCNC: 33.7 G/DL (ref 31.5–35.7)
MCV RBC AUTO: 93.7 FL (ref 79–97)
MONOCYTES # BLD AUTO: 0.42 10*3/MM3 (ref 0.1–0.9)
MONOCYTES NFR BLD AUTO: 7.7 % (ref 5–12)
NEUTROPHILS NFR BLD AUTO: 3.39 10*3/MM3 (ref 1.7–7)
NEUTROPHILS NFR BLD AUTO: 62.4 % (ref 42.7–76)
NITRITE UR QL STRIP: NEGATIVE
NRBC BLD AUTO-RTO: 0 /100 WBC (ref 0–0.2)
PH UR STRIP.AUTO: 7.5 [PH] (ref 5–8)
PLATELET # BLD AUTO: 252 10*3/MM3 (ref 140–450)
PMV BLD AUTO: 9.1 FL (ref 6–12)
POTASSIUM SERPL-SCNC: 4 MMOL/L (ref 3.5–5.2)
PROT SERPL-MCNC: 6.2 G/DL (ref 6–8.5)
PROT UR QL STRIP: NEGATIVE
RBC # BLD AUTO: 4.46 10*6/MM3 (ref 3.77–5.28)
SODIUM SERPL-SCNC: 140 MMOL/L (ref 136–145)
SP GR UR STRIP: 1.01 (ref 1–1.03)
UROBILINOGEN UR QL STRIP: NORMAL
WBC NRBC COR # BLD AUTO: 5.43 10*3/MM3 (ref 3.4–10.8)

## 2024-05-24 PROCEDURE — 80053 COMPREHEN METABOLIC PANEL: CPT | Performed by: NURSE PRACTITIONER

## 2024-05-24 PROCEDURE — 99284 EMERGENCY DEPT VISIT MOD MDM: CPT

## 2024-05-24 PROCEDURE — 85025 COMPLETE CBC W/AUTO DIFF WBC: CPT | Performed by: NURSE PRACTITIONER

## 2024-05-24 PROCEDURE — P9612 CATHETERIZE FOR URINE SPEC: HCPCS

## 2024-05-24 PROCEDURE — 96374 THER/PROPH/DIAG INJ IV PUSH: CPT

## 2024-05-24 PROCEDURE — 74176 CT ABD & PELVIS W/O CONTRAST: CPT

## 2024-05-24 PROCEDURE — 72131 CT LUMBAR SPINE W/O DYE: CPT

## 2024-05-24 PROCEDURE — 25810000003 SODIUM CHLORIDE 0.9 % SOLUTION: Performed by: NURSE PRACTITIONER

## 2024-05-24 PROCEDURE — 81003 URINALYSIS AUTO W/O SCOPE: CPT | Performed by: NURSE PRACTITIONER

## 2024-05-24 PROCEDURE — 25010000002 ONDANSETRON PER 1 MG: Performed by: NURSE PRACTITIONER

## 2024-05-24 RX ORDER — OXYCODONE HYDROCHLORIDE AND ACETAMINOPHEN 5; 325 MG/1; MG/1
1 TABLET ORAL EVERY 4 HOURS PRN
Qty: 6 TABLET | Refills: 0 | Status: SHIPPED | OUTPATIENT
Start: 2024-05-24

## 2024-05-24 RX ORDER — SODIUM CHLORIDE 0.9 % (FLUSH) 0.9 %
10 SYRINGE (ML) INJECTION AS NEEDED
Status: DISCONTINUED | OUTPATIENT
Start: 2024-05-24 | End: 2024-05-24 | Stop reason: HOSPADM

## 2024-05-24 RX ORDER — HYDROCODONE BITARTRATE AND ACETAMINOPHEN 5; 325 MG/1; MG/1
1 TABLET ORAL ONCE
Status: COMPLETED | OUTPATIENT
Start: 2024-05-24 | End: 2024-05-24

## 2024-05-24 RX ORDER — ONDANSETRON 2 MG/ML
4 INJECTION INTRAMUSCULAR; INTRAVENOUS ONCE
Status: COMPLETED | OUTPATIENT
Start: 2024-05-24 | End: 2024-05-24

## 2024-05-24 RX ORDER — OXYCODONE HYDROCHLORIDE AND ACETAMINOPHEN 5; 325 MG/1; MG/1
1 TABLET ORAL EVERY 4 HOURS PRN
Qty: 6 TABLET | Refills: 0 | Status: SHIPPED | OUTPATIENT
Start: 2024-05-24 | End: 2024-05-24

## 2024-05-24 RX ADMIN — ONDANSETRON 4 MG: 2 INJECTION INTRAMUSCULAR; INTRAVENOUS at 11:23

## 2024-05-24 RX ADMIN — HYDROCODONE BITARTRATE AND ACETAMINOPHEN 1 TABLET: 5; 325 TABLET ORAL at 11:11

## 2024-05-24 RX ADMIN — SODIUM CHLORIDE 500 ML: 9 INJECTION, SOLUTION INTRAVENOUS at 11:25

## 2024-05-24 NOTE — ED PROVIDER NOTES
Pt Name: Eleanor Preston  MRN: [id number]  : 1965  Date of Encounter: 2024    PCP: Maritza Cheng DO      Subjective    History of Present Illness:    Chief Complaint: Back pain radiating down right leg    History of Present Illness: Eleanor Preston is a 59 y.o. female who presents to the ER complaining of back pain, dysuria, pain radiating down right leg.  Patient states she has a history of back pain has been seen at multiple pain clinics and has had multiple spinal injections for her pain performed by the [facility] the other by pain clinic.  Patient states this has been ongoing for the last few days and is progressively worsened over the interval.  Patient is also states that she has issues with urination stating extremely painful urination, increased pressure.        Nurses Notes reviewed and agree, including vitals, allergies, social history and prior medical history.       Allergies:    Dilaudid [hydromorphone hcl], Morphine, and Sulfa antibiotics    Past Medical History:   Diagnosis Date    Alcohol abuse     Anxiety     Bipolar disorder     History of nephrectomy     PTSD (post-traumatic stress disorder)        Past Surgical History:   Procedure Laterality Date    NEPHRECTOMY Left        Social History     Socioeconomic History    Marital status:    Tobacco Use    Smoking status: Former     Current packs/day: 0.00     Types: Cigarettes     Quit date: 2014     Years since quitting: 10.1    Smokeless tobacco: Never   Vaping Use    Vaping status: Never Used   Substance and Sexual Activity    Alcohol use: Not Currently     Comment: Recovering    Drug use: Never    Sexual activity: Defer       Family History   Problem Relation Age of Onset    Alcohol abuse Father     Drug abuse Sister        REVIEW OF SYSTEMS:     All systems reviewed and not pertinent unless noted.    Review of Systems   Musculoskeletal:  Positive for back pain.   All other systems reviewed and are  negative.      Objective    Physical Exam  Vitals and nursing note reviewed.   Constitutional:       Appearance: Normal appearance.   HENT:      Head: Normocephalic and atraumatic.   Eyes:      Extraocular Movements: Extraocular movements intact.      Pupils: Pupils are equal, round, and reactive to light.   Cardiovascular:      Rate and Rhythm: Normal rate and regular rhythm.      Pulses: Normal pulses.      Heart sounds: Normal heart sounds.   Pulmonary:      Effort: Pulmonary effort is normal.      Breath sounds: Normal breath sounds.   Abdominal:      General: Abdomen is flat. Bowel sounds are normal.      Palpations: Abdomen is soft.   Musculoskeletal:         General: Tenderness present.      Cervical back: Normal range of motion and neck supple.      Comments: Mild tenderness palpation lumbar spine, no step-offs noted   Skin:     Capillary Refill: Capillary refill takes less than 2 seconds.   Neurological:      General: No focal deficit present.      Mental Status: She is alert and oriented to person, place, and time. Mental status is at baseline.      GCS: GCS eye subscore is 4. GCS verbal subscore is 5. GCS motor subscore is 6.      Sensory: Sensation is intact.      Motor: Motor function is intact.      Gait: Gait is intact.   Psychiatric:         Attention and Perception: Attention and perception normal.         Mood and Affect: Mood and affect normal.         Speech: Speech normal.         Behavior: Behavior normal. Behavior is cooperative.               Procedures    ED Course:         LAB Results:    Lab Results (last 24 hours)       Procedure Component Value Units Date/Time    CBC & Differential [[id number]]  (Normal) Collected: 05/24/24 1122    Specimen: Blood Updated: 05/24/24 1143    Narrative:      The following orders were created for panel order CBC & Differential.  Procedure                               Abnormality         Status                     ---------                                -----------         ------                     CBC Auto Differential[[id number]]        Normal              Final result                 Please view results for these tests on the individual orders.    CBC Auto Differential [[id number]]  (Normal) Collected: 05/24/24 1122    Specimen: Blood Updated: 05/24/24 1143     WBC 5.43 10*3/mm3      RBC 4.46 10*6/mm3      Hemoglobin 14.1 g/dL      Hematocrit 41.8 %      MCV 93.7 fL      MCH 31.6 pg      MCHC 33.7 g/dL      RDW 13.0 %      RDW-SD 45.0 fl      MPV 9.1 fL      Platelets 252 10*3/mm3      Neutrophil % 62.4 %      Lymphocyte % 28.0 %      Monocyte % 7.7 %      Eosinophil % 1.3 %      Basophil % 0.4 %      Immature Grans % 0.2 %      Neutrophils, Absolute 3.39 10*3/mm3      Lymphocytes, Absolute 1.52 10*3/mm3      Monocytes, Absolute 0.42 10*3/mm3      Eosinophils, Absolute 0.07 10*3/mm3      Basophils, Absolute 0.02 10*3/mm3      Immature Grans, Absolute 0.01 10*3/mm3      nRBC 0.0 /100 WBC     Comprehensive Metabolic Panel [[id number]]  (Abnormal) Collected: 05/24/24 1147    Specimen: Blood Updated: 05/24/24 1210     Glucose 95 mg/dL      BUN 16 mg/dL      Creatinine 0.98 mg/dL      Sodium 140 mmol/L      Potassium 4.0 mmol/L      Chloride 108 mmol/L      CO2 24.8 mmol/L      Calcium 8.8 mg/dL      Total Protein 6.2 g/dL      Albumin 3.9 g/dL      ALT (SGPT) 12 U/L      AST (SGOT) 16 U/L      Alkaline Phosphatase 47 U/L      Total Bilirubin 0.3 mg/dL      Globulin 2.3 gm/dL      A/G Ratio 1.7 g/dL      BUN/Creatinine Ratio 16.3     Anion Gap 7.2 mmol/L      eGFR 67.0 mL/min/1.73     Narrative:      GFR Normal >60  Chronic Kidney Disease <60  Kidney Failure <15      Urinalysis With Microscopic If Indicated (No Culture) - Urine, Catheter [[id number]]  (Normal) Collected: 05/24/24 1156    Specimen: Urine, Catheter Updated: 05/24/24 1205     Color, UA Yellow     Appearance, UA Clear     pH, UA 7.5     Specific Gravity, UA 1.008     Glucose, UA Negative     Ketones, UA  Negative     Bilirubin, UA Negative     Blood, UA Negative     Protein, UA Negative     Leuk Esterase, UA Negative     Nitrite, UA Negative     Urobilinogen, UA 0.2 E.U./dL    Narrative:      Urine microscopic not indicated.             If labs were ordered, I have independently reviewed the results and considered them in the diagnosis and treatment plan for the patient    RADIOLOGY    CT Abdomen Pelvis Without Contrast    Result Date: 5/24/2024  PROCEDURE: CT ABDOMEN PELVIS WO CONTRAST-  HISTORY: Dysuria, hematuria, possible kidney stone  COMPARISON: None.  PROCEDURE: Axial images were obtained from the lung bases to the pubic symphysis by computed tomography. This study was performed with techniques to keep radiation doses as low as reasonably achievable, (ALARA). Individualized dose reduction techniques using automated exposure control or adjustment of mA and/or kV according to the patient size were employed.  FINDINGS:  ABDOMEN: The lung bases are clear. Motion artifact noted. The heart is proper size. The limited non-contrast images of the liver are unremarkable. The spleen is unremarkable. No adrenal masses are seen. The aorta is normal in caliber. There is no significant free fluid or adenopathy.  There is no nephrolithiasis. There is no hydronephrosis. Patient is status post left nephrectomy. There are metallic surgical clips in the right upper quadrant consistent with previous cholecystectomy.  PELVIS: The GI tract demonstrate no obstruction. The appendix is not identified and there are metallic surgical clips at the base of the cecum. Anastomosis in the sigmoid colon identified. The urinary bladder is partially filled. No significant wall thickening, bladder stone or infiltration of the surrounding fat identified. Uterus is diminutive or absent. There is no fluid or adenopathy.      Impression: No hydronephrosis or nephrolithiasis.  Status post left nephrectomy.     CTDI: 16.43 mGy DLP:911.79 mGy.cm  This  report was signed and finalized on 5/24/2024 12:42 PM by aCrol Blackmon MD.      CT Lumbar Spine Without Contrast    Result Date: 5/24/2024  PROCEDURE: CT LUMBAR SPINE WO CONTRAST-  HISTORY: Low back pain, acute, status post left nephrectomy  PROCEDURE: Axial images were obtained through the lumbar spine by computed tomography. Reconstruction images were also performed. This study was performed with techniques to keep radiation doses as low as reasonably achievable, (ALARA). Individualized dose reduction techniques using automated exposure control or adjustment of mA and/or kV according to the patient size were employed.  FINDINGS: There is slight retrolisthesis L4 on L5 and L3 on L4. Small osteophytes identified throughout the lumbar spine. The vertebral bodies are of proper height. The facets overlap at all levels. There is mild disc base narrowing at L5-S1 with vacuum disc phenomenon. Sigmoid anastomosis identified. Degenerative change of the SI joints noted bilaterally. No bony destructive lesion seen.  L1-L2: No significant disc disease is present. There is no stenosis.  L2-L3: No significant disc disease is present. There is no stenosis.  L3-L4: There is mild annular bulge causing mild spinal stenosis and moderate right and left neuroforaminal narrowing.  L4-L5: Moderate annular bulge causes moderate spinal stenosis. There is moderate, bilateral neuroforaminal narrowing as well as ligamentum flavum hypertrophy and hypertrophic changes of the facets.  L5-S1: There is mild disc bulge/osteophyte complex without spinal stenosis. There is mild right and moderate left neuroforaminal narrowing.      Impression: No acute bony abnormality.  Lumbar degenerative disc changes described.    CTDI: 16.43 mGy DLP:911.79 mGy.cm  This report was signed and finalized on 5/24/2024 12:34 PM by Carol Blackmon MD.        If I have ordered, I have independently reviewed the above noted radiographic studies.  Please see the radiologist  dictation for the official interpretation    Medications given to patient in the ER    Medications   sodium chloride 0.9 % flush 10 mL (has no administration in time range)   sodium chloride 0.9 % bolus 500 mL (500 mL Intravenous New Bag 5/24/24 1125)   HYDROcodone-acetaminophen (NORCO) 5-325 MG per tablet 1 tablet (1 tablet Oral Given 5/24/24 1111)   ondansetron (ZOFRAN) injection 4 mg (4 mg Intravenous Given 5/24/24 1123)                 Shared Decision Making: After my consideration the clinical presentation and laboratory/radiology studies obtained, I discussed the findings with the patient/patient representative who is in agreement with the treatment plan and final disposition. Risks and benefits of discharge and/or observation admission were discussed.  Final disposition of the patient will be discharged home request patient follow-up with primary care provider and orthopedics for bulging lumbar disc, low back pain with sciatica    Medications prescribed: Patient was prescribed Percocet 5-3 25 1 p.o. every 6 hours as needed for pain number given was 6.  Jovanny was reviewed with last narcotic prescription in March.       Medical Decision Making  Eleanor Preston is a 59 y.o. female who presents to the ER complaining of back pain, dysuria, pain radiating down right leg.  Patient states she has a history of back pain has been seen at multiple pain clinics and has had multiple spinal injections for her pain performed by the [facility] the other by pain clinic.  Patient states this has been ongoing for the last few days and is progressively worsened over the interval.  Patient is also states that she has issues with urination stating extremely painful urination, increased pressure.    DDX: includes but is not limited to: Lumbar spinal fracture, bulging disc, sciatica, lumbago, other    Problems Addressed:  Bulging lumbar disc: complicated acute illness or injury  Low back pain with sciatica, sciatica  laterality unspecified, unspecified back pain laterality, unspecified chronicity: complicated acute illness or injury    Amount and/or Complexity of Data Reviewed  Labs: ordered. Decision-making details documented in ED Course.     Details: Reviewed  Radiology: ordered. Decision-making details documented in ED Course.     Details: I have personally reviewed and documented all results  Discussion of management or test interpretation with external provider(s): Discussed assessment, treatment and plan with ER attending    Risk  Prescription drug management.  Risk Details: I have discussed with patient the finding of the test preformed today. Patient has been diagnosed with bulging lumbar disc, low back pain with sciatica and will be discharged home.  Patient requested to follow-up with primary care provider, orthopedics within the next 7 days for reevaluation. Strict return precautions have been given and patient verbalizes understanding          Final diagnoses:   Low back pain with sciatica, sciatica laterality unspecified, unspecified back pain laterality, unspecified chronicity   Bulging lumbar disc         Please note that portions of this document were completed using voice recognition dictation software.       Benitez Valle, APRN  05/24/24 2818

## 2024-05-25 DIAGNOSIS — F31.81 BIPOLAR II DISORDER: ICD-10-CM

## 2024-05-25 DIAGNOSIS — F41.1 GENERALIZED ANXIETY DISORDER: ICD-10-CM

## 2024-05-28 RX ORDER — LAMOTRIGINE 200 MG/1
200 TABLET ORAL 2 TIMES DAILY
Qty: 180 TABLET | Refills: 3 | OUTPATIENT
Start: 2024-05-28

## 2024-05-28 RX ORDER — BUPROPION HYDROCHLORIDE 150 MG/1
150 TABLET ORAL EVERY MORNING
Qty: 90 TABLET | Refills: 3 | OUTPATIENT
Start: 2024-05-28

## 2024-06-11 DIAGNOSIS — F51.04 PSYCHOPHYSIOLOGICAL INSOMNIA: ICD-10-CM

## 2024-06-11 RX ORDER — HYDROCODONE BITARTRATE AND ACETAMINOPHEN 5; 325 MG/1; MG/1
1 TABLET ORAL EVERY 8 HOURS PRN
COMMUNITY
Start: 2024-05-29

## 2024-06-11 RX ORDER — ZOLPIDEM TARTRATE 10 MG/1
10 TABLET ORAL NIGHTLY PRN
Qty: 30 TABLET | Refills: 1 | Status: CANCELLED | OUTPATIENT
Start: 2024-06-11

## 2024-06-11 RX ORDER — GABAPENTIN 300 MG/1
300 CAPSULE ORAL
COMMUNITY
Start: 2024-05-28

## 2024-06-11 NOTE — TELEPHONE ENCOUNTER
Patient wanted to infrom Ana that she is having major back surgery August 20th. Provider prescribes her Gabapentin and Hydrocodone, and wanted Ana aware due to being prescribed Ambien.

## 2024-06-11 NOTE — TELEPHONE ENCOUNTER
WOULD LIKE A CALL BACK GOING TO BE HAVING SURGERY AND  WANTS HER TO INFORM [facility] WHAT SHE IS TAKING AND HER MENTAL HEALTH

## 2024-06-24 DIAGNOSIS — F90.2 ATTENTION DEFICIT HYPERACTIVITY DISORDER, COMBINED TYPE: ICD-10-CM

## 2024-06-24 RX ORDER — METHYLPHENIDATE HYDROCHLORIDE 54 MG/1
54 TABLET ORAL EVERY MORNING
Qty: 30 TABLET | Refills: 0 | Status: SHIPPED | OUTPATIENT
Start: 2024-06-24 | End: 2024-06-26 | Stop reason: SDUPTHER

## 2024-06-26 RX ORDER — METHYLPHENIDATE HYDROCHLORIDE 54 MG/1
54 TABLET ORAL EVERY MORNING
Qty: 30 TABLET | Refills: 0 | Status: SHIPPED | OUTPATIENT
Start: 2024-06-26

## 2024-06-26 NOTE — TELEPHONE ENCOUNTER
Rx Refill Note  Requested Prescriptions     Pending Prescriptions Disp Refills    methylphenidate (Concerta) 54 MG CR tablet 30 tablet 0     Sig: Take 1 tablet by mouth Every Morning     Signed Prescriptions Disp Refills    methylphenidate (Concerta) 54 MG CR tablet 30 tablet 0     Sig: Take 1 tablet by mouth Every Morning     Authorizing Provider: ANJELICA CALDERON      Last office visit with prescribing clinician: 11/14/2023   Last telemedicine visit with prescribing clinician: 5/13/2024   Next office visit with prescribing clinician: 8/13/2024                         Would you like a call back once the refill request has been completed: [] Yes [] No    If the office needs to give you a call back, can they leave a voicemail: [] Yes [] No    Rhonda Cuevas MA  06/26/24, 13:21 EDT

## 2024-07-22 DIAGNOSIS — F41.1 GENERALIZED ANXIETY DISORDER: ICD-10-CM

## 2024-07-22 DIAGNOSIS — F51.04 PSYCHOPHYSIOLOGICAL INSOMNIA: ICD-10-CM

## 2024-07-23 RX ORDER — HYDROXYZINE 50 MG/1
50 TABLET, FILM COATED ORAL NIGHTLY
Qty: 90 TABLET | Refills: 0 | Status: SHIPPED | OUTPATIENT
Start: 2024-07-23

## 2024-07-26 DIAGNOSIS — F51.04 PSYCHOPHYSIOLOGICAL INSOMNIA: ICD-10-CM

## 2024-07-26 NOTE — TELEPHONE ENCOUNTER
PATIENT HAS BACK SURGERY ON 8/2 AND SHE HAS TO STOP THE ESTROGEN PRIOR TO HER SURGERY. IT COULD CAUSE HER TO BE DEPRESSED BAD AND SHE WANTS TO KNOW WHAT TO DO TO EITHER PREVENT IT OR WAIT AND SEE WHAT HAPPENS

## 2024-07-29 RX ORDER — ZOLPIDEM TARTRATE 10 MG/1
10 TABLET ORAL NIGHTLY PRN
Qty: 30 TABLET | Refills: 1 | Status: SHIPPED | OUTPATIENT
Start: 2024-07-29

## 2024-07-29 NOTE — TELEPHONE ENCOUNTER
"\"PATIENT HAS BACK SURGERY ON 8/2 AND SHE HAS TO STOP THE ESTROGEN PRIOR TO HER SURGERY. IT COULD CAUSE HER TO BE DEPRESSED BAD AND SHE WANTS TO KNOW WHAT TO DO TO EITHER PREVENT IT OR WAIT AND SEE WHAT HAPPENS \"    Rx Refill Note  Requested Prescriptions     Pending Prescriptions Disp Refills    zolpidem (AMBIEN) 10 MG tablet 30 tablet 1     Sig: Take 1 tablet by mouth At Night As Needed for Sleep.      Last office visit with prescribing clinician: 11/14/2023   Last telemedicine visit with prescribing clinician: 5/13/2024   Next office visit with prescribing clinician: 9/23/2024                         Would you like a call back once the refill request has been completed: [] Yes [] No    If the office needs to give you a call back, can they leave a voicemail: [] Yes [] No    Rhonda Cuevas MA  07/29/24, 08:45 EDT    "

## 2024-07-29 NOTE — TELEPHONE ENCOUNTER
Let's see what happens. I am hopeful that with her mood stabilizer and anti-depressants her mood will continue to be stable.

## 2024-07-31 ENCOUNTER — TELEPHONE (OUTPATIENT)
Dept: BEHAVIORAL HEALTH | Facility: CLINIC | Age: 59
End: 2024-07-31
Payer: MEDICARE

## 2024-07-31 DIAGNOSIS — F41.1 GENERALIZED ANXIETY DISORDER: ICD-10-CM

## 2024-07-31 DIAGNOSIS — F31.81 BIPOLAR II DISORDER: ICD-10-CM

## 2024-07-31 RX ORDER — CITALOPRAM 20 MG/1
20 TABLET ORAL DAILY
Qty: 90 TABLET | Refills: 0 | Status: SHIPPED | OUTPATIENT
Start: 2024-07-31 | End: 2024-08-01 | Stop reason: SDUPTHER

## 2024-07-31 RX ORDER — TOPIRAMATE 100 MG/1
100 TABLET, FILM COATED ORAL 2 TIMES DAILY
Qty: 180 TABLET | Refills: 3 | Status: SHIPPED | OUTPATIENT
Start: 2024-07-31 | End: 2024-08-01 | Stop reason: SDUPTHER

## 2024-07-31 RX ORDER — BUPROPION HYDROCHLORIDE 150 MG/1
150 TABLET ORAL EVERY MORNING
Qty: 90 TABLET | Refills: 0 | Status: SHIPPED | OUTPATIENT
Start: 2024-07-31 | End: 2024-08-01 | Stop reason: SDUPTHER

## 2024-07-31 RX ORDER — BUSPIRONE HYDROCHLORIDE 10 MG/1
10 TABLET ORAL 3 TIMES DAILY
Qty: 270 TABLET | Refills: 0 | Status: SHIPPED | OUTPATIENT
Start: 2024-07-31 | End: 2024-08-01 | Stop reason: SDUPTHER

## 2024-07-31 RX ORDER — LAMOTRIGINE 200 MG/1
200 TABLET ORAL 2 TIMES DAILY
Qty: 180 TABLET | Refills: 0 | Status: SHIPPED | OUTPATIENT
Start: 2024-07-31 | End: 2024-08-01 | Stop reason: SDUPTHER

## 2024-08-01 DIAGNOSIS — F31.81 BIPOLAR II DISORDER: ICD-10-CM

## 2024-08-01 DIAGNOSIS — F41.1 GENERALIZED ANXIETY DISORDER: ICD-10-CM

## 2024-08-01 RX ORDER — TOPIRAMATE 100 MG/1
100 TABLET, FILM COATED ORAL 2 TIMES DAILY
Qty: 180 TABLET | Refills: 3 | Status: SHIPPED | OUTPATIENT
Start: 2024-08-01

## 2024-08-01 RX ORDER — LAMOTRIGINE 200 MG/1
200 TABLET ORAL 2 TIMES DAILY
Qty: 180 TABLET | Refills: 0 | Status: SHIPPED | OUTPATIENT
Start: 2024-08-01

## 2024-08-01 RX ORDER — BUSPIRONE HYDROCHLORIDE 10 MG/1
10 TABLET ORAL 3 TIMES DAILY
Qty: 270 TABLET | Refills: 0 | Status: SHIPPED | OUTPATIENT
Start: 2024-08-01

## 2024-08-01 RX ORDER — BUPROPION HYDROCHLORIDE 150 MG/1
150 TABLET ORAL EVERY MORNING
Qty: 90 TABLET | Refills: 3 | OUTPATIENT
Start: 2024-08-01

## 2024-08-01 RX ORDER — CITALOPRAM 20 MG/1
20 TABLET ORAL DAILY
Qty: 90 TABLET | Refills: 0 | Status: SHIPPED | OUTPATIENT
Start: 2024-08-01

## 2024-08-01 RX ORDER — LAMOTRIGINE 200 MG/1
200 TABLET ORAL 2 TIMES DAILY
Qty: 180 TABLET | Refills: 3 | OUTPATIENT
Start: 2024-08-01

## 2024-08-01 RX ORDER — BUPROPION HYDROCHLORIDE 150 MG/1
150 TABLET ORAL EVERY MORNING
Qty: 90 TABLET | Refills: 0 | Status: SHIPPED | OUTPATIENT
Start: 2024-08-01

## 2024-08-01 NOTE — TELEPHONE ENCOUNTER
The request received was a fax from [facility]. Will send it to [facility] though.

## 2024-08-30 DIAGNOSIS — F51.04 PSYCHOPHYSIOLOGICAL INSOMNIA: ICD-10-CM

## 2024-09-01 RX ORDER — ZOLPIDEM TARTRATE 10 MG/1
10 TABLET ORAL NIGHTLY PRN
Qty: 30 TABLET | Refills: 1 | Status: SHIPPED | OUTPATIENT
Start: 2024-09-01

## 2024-09-23 ENCOUNTER — TELEMEDICINE (OUTPATIENT)
Dept: BEHAVIORAL HEALTH | Facility: CLINIC | Age: 59
End: 2024-09-23
Payer: MEDICARE

## 2024-09-23 DIAGNOSIS — F51.04 PSYCHOPHYSIOLOGICAL INSOMNIA: ICD-10-CM

## 2024-09-23 DIAGNOSIS — F90.2 ATTENTION DEFICIT HYPERACTIVITY DISORDER, COMBINED TYPE: Primary | ICD-10-CM

## 2024-09-23 DIAGNOSIS — F41.1 GENERALIZED ANXIETY DISORDER: ICD-10-CM

## 2024-09-23 DIAGNOSIS — F31.81 BIPOLAR II DISORDER: ICD-10-CM

## 2024-09-23 PROCEDURE — 99214 OFFICE O/P EST MOD 30 MIN: CPT

## 2024-09-23 PROCEDURE — 1159F MED LIST DOCD IN RCRD: CPT

## 2024-09-23 PROCEDURE — 1160F RVW MEDS BY RX/DR IN RCRD: CPT

## 2024-09-23 RX ORDER — LAMOTRIGINE 200 MG/1
200 TABLET ORAL 2 TIMES DAILY
Qty: 180 TABLET | Refills: 0 | Status: SHIPPED | OUTPATIENT
Start: 2024-09-23

## 2024-09-23 RX ORDER — CITALOPRAM HYDROBROMIDE 20 MG/1
20 TABLET ORAL DAILY
Qty: 90 TABLET | Refills: 0 | Status: SHIPPED | OUTPATIENT
Start: 2024-09-23

## 2024-09-23 RX ORDER — ZOLPIDEM TARTRATE 10 MG/1
10 TABLET ORAL NIGHTLY PRN
Qty: 30 TABLET | Refills: 1 | Status: SHIPPED | OUTPATIENT
Start: 2024-09-23

## 2024-09-23 RX ORDER — BUSPIRONE HYDROCHLORIDE 10 MG/1
10 TABLET ORAL 3 TIMES DAILY
Qty: 270 TABLET | Refills: 0 | Status: SHIPPED | OUTPATIENT
Start: 2024-09-23

## 2024-09-23 RX ORDER — TOPIRAMATE 100 MG/1
100 TABLET, FILM COATED ORAL 2 TIMES DAILY
Qty: 180 TABLET | Refills: 3 | Status: SHIPPED | OUTPATIENT
Start: 2024-09-23

## 2024-09-23 RX ORDER — HYDROXYZINE HYDROCHLORIDE 50 MG/1
50 TABLET, FILM COATED ORAL NIGHTLY
Qty: 90 TABLET | Refills: 0 | Status: SHIPPED | OUTPATIENT
Start: 2024-09-23

## 2024-09-23 RX ORDER — BUPROPION HYDROCHLORIDE 150 MG/1
150 TABLET ORAL EVERY MORNING
Qty: 90 TABLET | Refills: 0 | Status: SHIPPED | OUTPATIENT
Start: 2024-09-23

## 2024-09-23 RX ORDER — VILOXAZINE HYDROCHLORIDE 200 MG/1
200 CAPSULE, EXTENDED RELEASE ORAL DAILY
Qty: 30 CAPSULE | Refills: 1 | Status: SHIPPED | OUTPATIENT
Start: 2024-09-23

## 2024-09-24 ENCOUNTER — TELEPHONE (OUTPATIENT)
Dept: FAMILY MEDICINE CLINIC | Facility: CLINIC | Age: 59
End: 2024-09-24
Payer: MEDICARE

## 2024-09-25 ENCOUNTER — PRIOR AUTHORIZATION (OUTPATIENT)
Dept: BEHAVIORAL HEALTH | Facility: CLINIC | Age: 59
End: 2024-09-25
Payer: MEDICARE

## 2024-09-30 ENCOUNTER — TELEPHONE (OUTPATIENT)
Dept: BEHAVIORAL HEALTH | Facility: CLINIC | Age: 59
End: 2024-09-30
Payer: MEDICARE

## 2024-09-30 NOTE — TELEPHONE ENCOUNTER
PATIENT SAYS SHE WAS TOLD BY THE PHARMACY THAT SHRUTI HAS A DRUG INTERACTION WITH HER TIZANADINE (MUSCLE RELAXER 4 MG, FOR BACK, WHICH I DON'T SEE ON MED LIST-PRESCRIBED BY OTHER DOCTOR-OFFICE). SHE IS GOING TO CALL THE OTHER OFFICE AND ASK THEM TO SWITCH HER TO SOMETHING ELSE THAT DOESN'T INTERAC. SHE ASKED IF WE CAN CALL THE PHARMACY AND ASK THEM TO DISCONTINUE THE MUSCLE RELAXER AND GO AHEAD AND FILL OURS. SHE WASN'T SURE HOW THAT WORKED.   *I TOLD HER THAT THE OTHER OFFICE MAY HAVE TO DO THAT SINCE THEY PRESCRIBED IT, BUT WASN'T SURE*

## 2024-10-14 DIAGNOSIS — F31.81 BIPOLAR II DISORDER: ICD-10-CM

## 2024-10-14 DIAGNOSIS — F41.1 GENERALIZED ANXIETY DISORDER: ICD-10-CM

## 2024-11-04 ENCOUNTER — OFFICE VISIT (OUTPATIENT)
Dept: BEHAVIORAL HEALTH | Facility: CLINIC | Age: 59
End: 2024-11-04
Payer: MEDICARE

## 2024-11-04 VITALS
SYSTOLIC BLOOD PRESSURE: 124 MMHG | HEIGHT: 65 IN | OXYGEN SATURATION: 99 % | HEART RATE: 75 BPM | DIASTOLIC BLOOD PRESSURE: 82 MMHG | BODY MASS INDEX: 27.99 KG/M2 | WEIGHT: 168 LBS

## 2024-11-04 DIAGNOSIS — F90.2 ATTENTION DEFICIT HYPERACTIVITY DISORDER, COMBINED TYPE: Primary | ICD-10-CM

## 2024-11-04 DIAGNOSIS — F51.04 PSYCHOPHYSIOLOGICAL INSOMNIA: ICD-10-CM

## 2024-11-04 DIAGNOSIS — F31.81 BIPOLAR II DISORDER: ICD-10-CM

## 2024-11-04 DIAGNOSIS — Z51.81 ENCOUNTER FOR THERAPEUTIC DRUG MONITORING: ICD-10-CM

## 2024-11-04 DIAGNOSIS — F41.1 GENERALIZED ANXIETY DISORDER: ICD-10-CM

## 2024-11-04 RX ORDER — TOPIRAMATE 100 MG/1
100 TABLET, FILM COATED ORAL 2 TIMES DAILY
Qty: 180 TABLET | Refills: 0 | Status: SHIPPED | OUTPATIENT
Start: 2024-11-04

## 2024-11-04 RX ORDER — BUSPIRONE HYDROCHLORIDE 10 MG/1
10 TABLET ORAL 3 TIMES DAILY
Qty: 270 TABLET | Refills: 0 | Status: SHIPPED | OUTPATIENT
Start: 2024-11-04

## 2024-11-04 RX ORDER — BUPROPION HYDROCHLORIDE 150 MG/1
150 TABLET ORAL EVERY MORNING
Qty: 90 TABLET | Refills: 3 | OUTPATIENT
Start: 2024-11-04

## 2024-11-04 RX ORDER — DEXTROAMPHETAMINE SACCHARATE, AMPHETAMINE ASPARTATE MONOHYDRATE, DEXTROAMPHETAMINE SULFATE AND AMPHETAMINE SULFATE 6.25; 6.25; 6.25; 6.25 MG/1; MG/1; MG/1; MG/1
25 CAPSULE, EXTENDED RELEASE ORAL DAILY
Qty: 30 CAPSULE | Refills: 0 | Status: SHIPPED | OUTPATIENT
Start: 2024-11-04

## 2024-11-04 RX ORDER — LAMOTRIGINE 200 MG/1
200 TABLET ORAL 2 TIMES DAILY
Qty: 180 TABLET | Refills: 0 | Status: SHIPPED | OUTPATIENT
Start: 2024-11-04

## 2024-11-04 RX ORDER — CITALOPRAM HYDROBROMIDE 20 MG/1
20 TABLET ORAL DAILY
Qty: 90 TABLET | Refills: 0 | Status: SHIPPED | OUTPATIENT
Start: 2024-11-04

## 2024-11-04 RX ORDER — HYDROXYZINE HYDROCHLORIDE 50 MG/1
50 TABLET, FILM COATED ORAL NIGHTLY
Qty: 90 TABLET | Refills: 0 | Status: SHIPPED | OUTPATIENT
Start: 2024-11-04

## 2024-11-04 RX ORDER — ZOLPIDEM TARTRATE 10 MG/1
10 TABLET ORAL NIGHTLY PRN
Qty: 30 TABLET | Refills: 1 | Status: SHIPPED | OUTPATIENT
Start: 2024-11-04

## 2024-11-04 RX ORDER — BUPROPION HYDROCHLORIDE 150 MG/1
150 TABLET ORAL EVERY MORNING
Qty: 90 TABLET | Refills: 0 | Status: SHIPPED | OUTPATIENT
Start: 2024-11-04

## 2024-11-04 RX ORDER — BUSPIRONE HYDROCHLORIDE 10 MG/1
10 TABLET ORAL 3 TIMES DAILY
Qty: 270 TABLET | Refills: 3 | OUTPATIENT
Start: 2024-11-04

## 2024-11-04 RX ORDER — CITALOPRAM HYDROBROMIDE 20 MG/1
20 TABLET ORAL DAILY
Qty: 90 TABLET | Refills: 3 | OUTPATIENT
Start: 2024-11-04

## 2024-11-04 RX ORDER — LAMOTRIGINE 200 MG/1
200 TABLET ORAL 2 TIMES DAILY
Qty: 180 TABLET | Refills: 3 | OUTPATIENT
Start: 2024-11-04

## 2024-11-04 NOTE — PROGRESS NOTES
Follow Up Office Visit    Patient Name: Eleanor Preston  : 1965   MRN: [id number]   Care Team: Patient Care Team:  Maritza Cheng DO as PCP - General (Family Medicine)  Lia Marroquin APRN as Nurse Practitioner (Behavioral Health)         Chief Complaint:    Chief Complaint   Patient presents with    ADHD    Anxiety    Sleeping Problem    Bipolar    Med Management       History of Present Illness: Eleanor Preston is a 59 y.o. female who is here today for a medication management follow up. Patient reports that she has been having a hard time. She never started the Qelbree and her mind is all over the place. She feels that her untreated ADHD is impacting her daily. She also endorses quite a bit of situational stressors and recently left her . She does feel that her other medications are managing her mood well despite all of the stress. She denies SI/HI today.       The following portion of the patient's history were reviewed and updated appropriately: allergies, current and past medications, family history, medical history and social history. KIRSTY reviewed and appropriate.   Subjective   Review of Systems:    Review of Systems   Respiratory: Negative.     Cardiovascular: Negative.  Negative for chest pain and palpitations.   Neurological: Negative.    Psychiatric/Behavioral:  Positive for decreased concentration and stress.    All other systems reviewed and are negative.      Current Medications:   Current Outpatient Medications   Medication Sig Dispense Refill    buPROPion XL (WELLBUTRIN XL) 150 MG 24 hr tablet Take 1 tablet by mouth Every Morning. 90 tablet 0    busPIRone (BUSPAR) 10 MG tablet Take 1 tablet by mouth 3 (Three) Times a Day. 270 tablet 0    citalopram (CeleXA) 20 MG tablet Take 1 tablet by mouth Daily. 90 tablet 0    hydrOXYzine (ATARAX) 50 MG tablet Take 1 tablet by mouth Every Night. 90 tablet 0    lamoTRIgine (LaMICtal) 200 MG tablet Take 1 tablet by mouth 2 (Two) Times  "a Day. 180 tablet 0    topiramate (TOPAMAX) 100 MG tablet Take 1 tablet by mouth 2 (Two) Times a Day. 180 tablet 0    zolpidem (AMBIEN) 10 MG tablet Take 1 tablet by mouth At Night As Needed for Sleep. 30 tablet 1    amphetamine-dextroamphetamine XR (ADDERALL XR) 25 MG 24 hr capsule Take 1 capsule by mouth Daily 30 capsule 0    estradiol (ESTRACE) 2 MG tablet       gabapentin (NEURONTIN) 300 MG capsule Take 1 capsule by mouth.      HYDROcodone-acetaminophen (NORCO) 5-325 MG per tablet Take 1 tablet by mouth Every 8 (Eight) Hours As Needed.      multivitamin with minerals tablet tablet       oxyCODONE-acetaminophen (PERCOCET) 5-325 MG per tablet Take 1 tablet by mouth Every 4 (Four) Hours As Needed for Severe Pain for up to 6 doses. 6 tablet 0    rOPINIRole (REQUIP) 0.5 MG tablet TAKE 2 TABLETS BY MOUTH 1 TO 3 HOURS BEFORE BEDTIME      valACYclovir (VALTREX) 500 MG tablet Take 1 tablet by mouth Every 12 (Twelve) Hours.       No current facility-administered medications for this visit.       Mental Status Exam:   Hygiene:   good  Cooperation:  Cooperative  Eye Contact:  Good  Psychomotor Behavior:  Appropriate  Affect:  Appropriate  Mood: sad, anxious, and fluctates  Speech:  Normal  Thought Process:  Goal directed and Linear  Thought Content:  Normal and Mood congruent  Suicidal:  None  Homicidal:  None  Hallucinations:  None  Delusion:  None  Memory:  Intact  Orientation:  Person, Place, Time, and Situation  Reliability:  good  Insight:  Good  Judgement:  Good  Impulse Control:  Good  Physical/Medical Issues:  Yes see chart       Objective   Vital Signs:   /82   Pulse 75   Ht 165.1 cm (65\")   Wt 76.2 kg (168 lb)   SpO2 99%   BMI 27.96 kg/m²       Assessment / Plan    Diagnoses and all orders for this visit:    1. Attention deficit hyperactivity disorder, combined type (Primary)  -     amphetamine-dextroamphetamine XR (ADDERALL XR) 25 MG 24 hr capsule; Take 1 capsule by mouth Daily  Dispense: 30 capsule; " Refill: 0    2. Encounter for therapeutic drug monitoring  -     Compliance Drug Analysis, Ur - Urine, Clean Catch    3. Generalized anxiety disorder  -     buPROPion XL (WELLBUTRIN XL) 150 MG 24 hr tablet; Take 1 tablet by mouth Every Morning.  Dispense: 90 tablet; Refill: 0  -     busPIRone (BUSPAR) 10 MG tablet; Take 1 tablet by mouth 3 (Three) Times a Day.  Dispense: 270 tablet; Refill: 0  -     citalopram (CeleXA) 20 MG tablet; Take 1 tablet by mouth Daily.  Dispense: 90 tablet; Refill: 0  -     hydrOXYzine (ATARAX) 50 MG tablet; Take 1 tablet by mouth Every Night.  Dispense: 90 tablet; Refill: 0  -     topiramate (TOPAMAX) 100 MG tablet; Take 1 tablet by mouth 2 (Two) Times a Day.  Dispense: 180 tablet; Refill: 0    4. Bipolar II disorder  -     buPROPion XL (WELLBUTRIN XL) 150 MG 24 hr tablet; Take 1 tablet by mouth Every Morning.  Dispense: 90 tablet; Refill: 0  -     citalopram (CeleXA) 20 MG tablet; Take 1 tablet by mouth Daily.  Dispense: 90 tablet; Refill: 0  -     lamoTRIgine (LaMICtal) 200 MG tablet; Take 1 tablet by mouth 2 (Two) Times a Day.  Dispense: 180 tablet; Refill: 0  -     topiramate (TOPAMAX) 100 MG tablet; Take 1 tablet by mouth 2 (Two) Times a Day.  Dispense: 180 tablet; Refill: 0    5. Psychophysiological insomnia  -     hydrOXYzine (ATARAX) 50 MG tablet; Take 1 tablet by mouth Every Night.  Dispense: 90 tablet; Refill: 0  -     zolpidem (AMBIEN) 10 MG tablet; Take 1 tablet by mouth At Night As Needed for Sleep.  Dispense: 30 tablet; Refill: 1     Will discontinue Qelbree and start Adderall XR. Continue all other medication as ordered. Obtained yearly urine drug screen and controlled substance agreement signed.     History and physical exam exhibit continued safe and appropriate use of controlled substance.    As part of patient's treatment plan I am prescribing a controlled substance.  The patient has been made aware of the appropriate use of such medications, including potential risk of  somnolence, limited ability to drive and/or work safely, and potential for dependence and/or overdose.  It has also been made clear that these medications are for use by this patient only, without concomitant use of alcohol or other substances, unless prescribed.    Patient has completed a prescribing agreement detailing terms of continued prescribing of controlled substances, including monitoring KIRSTY reports, urine drug screening, and pill counts if necessary.  Patient is aware that inappropriate use will result in cessation of prescribing such medications.    PHQ-9 Depression Screening  Little interest or pleasure in doing things? Over half   Feeling down, depressed, or hopeless? Several days   Trouble falling or staying asleep, or sleeping too much? Over half   Feeling tired or having little energy? Several days   Poor appetite or overeating? Several days   Feeling bad about yourself - or that you are a failure or have let yourself or your family down? Several days   Trouble concentrating on things, such as reading the newspaper or watching television? Almost all   Moving or speaking so slowly that other people could have noticed? Or the opposite - being so fidgety or restless that you have been moving around a lot more than usual? Not at all   Thoughts that you would be better off dead, or of hurting yourself in some way? Not at all   PHQ-9 Total Score 11   If you checked off any problems, how difficult have these problems made it for you to do your work, take care of things at home, or get along with other people? Somewhat difficult     PHQ-9 Score:   PHQ-9 Total Score: 11    Depression Screening:  Patient screened positive for depression based on a PHQ-9 score of 11 on 11/4/2024. Follow-up recommendations include: Prescribed antidepressant medication treatment and Suicide Risk Assessment performed.        QUIN-7  Over the last two weeks, how often have you been bothered by the following problems?  Feeling  nervous, anxious or on edge: More than half the days  Not being able to stop or control worrying: More than half the days  Worrying too much about different things: More than half the days  Trouble Relaxing: Nearly every day  Being so restless that it is hard to sit still: Not at all  Becoming easily annoyed or irritable: Not at all  Feeling afraid as if something awful might happen: More than half the days  QUIN 7 Total Score: 11  If you checked any problems, how difficult have these problems made it for you to do your work, take care of things at home, or get along with other people: Somewhat difficult    A psychological evaluation was conducted in order to assess past and current level of functioning. Areas assessed included, but were not limited to: perception of social support, perception of ability to face and deal with challenges in life (positive functioning), anxiety symptoms, depressive symptoms, perspective on beliefs/belief system, coping skills for stress, intelligence level,  Therapeutic rapport was established. Interventions conducted today were geared towards incorporating medication management along with support for continued therapy. Education was also provided as to the med management with this provider and what to expect in subsequent sessions.      We discussed risks, benefits, goals and side effects of the above medication and the patient was agreeable with the plan. Patient was educated on the importance of compliance with treatment and follow-up appointments. Patient is aware to contact the [facility] with any worsening of symptoms. To call for questions or concerns and return early if necessary. Patent is agreeable to go to the Emergency Department or call 911 should they begin SI/HI.    MEDS ORDERED DURING VISIT:  New Medications Ordered This Visit   Medications    buPROPion XL (WELLBUTRIN XL) 150 MG 24 hr tablet     Sig: Take 1 tablet by mouth Every Morning.     Dispense:  90 tablet      Refill:  0    busPIRone (BUSPAR) 10 MG tablet     Sig: Take 1 tablet by mouth 3 (Three) Times a Day.     Dispense:  270 tablet     Refill:  0    citalopram (CeleXA) 20 MG tablet     Sig: Take 1 tablet by mouth Daily.     Dispense:  90 tablet     Refill:  0    lamoTRIgine (LaMICtal) 200 MG tablet     Sig: Take 1 tablet by mouth 2 (Two) Times a Day.     Dispense:  180 tablet     Refill:  0    hydrOXYzine (ATARAX) 50 MG tablet     Sig: Take 1 tablet by mouth Every Night.     Dispense:  90 tablet     Refill:  0    topiramate (TOPAMAX) 100 MG tablet     Sig: Take 1 tablet by mouth 2 (Two) Times a Day.     Dispense:  180 tablet     Refill:  0    amphetamine-dextroamphetamine XR (ADDERALL XR) 25 MG 24 hr capsule     Sig: Take 1 capsule by mouth Daily     Dispense:  30 capsule     Refill:  0    zolpidem (AMBIEN) 10 MG tablet     Sig: Take 1 tablet by mouth At Night As Needed for Sleep.     Dispense:  30 tablet     Refill:  1         Follow Up   Return in about 6 weeks (around 12/16/2024).  Patient was given instructions and counseling regarding her condition or for health maintenance advice. Please see specific information pulled into the AVS if appropriate.     TREATMENT PLAN/GOALS: Continue supportive psychotherapy efforts and medications as indicated. Treatment and medication options discussed during today's visit. Patient acknowledged and verbally consented to continue with current treatment plan and was educated on the importance of compliance with treatment and follow-up appointments.    MEDICATION ISSUES:  Discussed medication options and treatment plan of prescribed medication as well as the risks, benefits, and side effects including potential falls, possible impaired driving and metabolic adversities among others. Patient is agreeable to call the office with any worsening of symptoms or onset of side effects. Patient is agreeable to call 911 or go to the nearest ER should he/she begin having SI/HI.        Lia  [facility]  [address] [place] [id number] [phone]    November 5, 2024 13:32 EST

## 2024-11-12 LAB — DRUGS UR: NORMAL

## 2024-12-06 DIAGNOSIS — F90.2 ATTENTION DEFICIT HYPERACTIVITY DISORDER, COMBINED TYPE: ICD-10-CM

## 2024-12-06 NOTE — TELEPHONE ENCOUNTER
Rx Refill Note  Requested Prescriptions     Pending Prescriptions Disp Refills    amphetamine-dextroamphetamine XR (ADDERALL XR) 25 MG 24 hr capsule 30 capsule 0     Sig: Take 1 capsule by mouth Daily      Last office visit with prescribing clinician: 11/4/2024   Last telemedicine visit with prescribing clinician: 9/23/2024   Next office visit with prescribing clinician: 1/13/2025                         Would you like a call back once the refill request has been completed: [] Yes [] No    If the office needs to give you a call back, can they leave a voicemail: [] Yes [] No    Amelia Beltran MA  12/06/24, 08:48 EST

## 2024-12-09 RX ORDER — DEXTROAMPHETAMINE SACCHARATE, AMPHETAMINE ASPARTATE MONOHYDRATE, DEXTROAMPHETAMINE SULFATE AND AMPHETAMINE SULFATE 6.25; 6.25; 6.25; 6.25 MG/1; MG/1; MG/1; MG/1
25 CAPSULE, EXTENDED RELEASE ORAL DAILY
Qty: 30 CAPSULE | Refills: 0 | Status: SHIPPED | OUTPATIENT
Start: 2024-12-09

## 2024-12-17 ENCOUNTER — TELEPHONE (OUTPATIENT)
Dept: FAMILY MEDICINE CLINIC | Facility: CLINIC | Age: 59
End: 2024-12-17
Payer: MEDICARE

## 2024-12-17 ENCOUNTER — PRIOR AUTHORIZATION (OUTPATIENT)
Dept: FAMILY MEDICINE CLINIC | Facility: CLINIC | Age: 59
End: 2024-12-17
Payer: MEDICARE

## 2024-12-17 NOTE — TELEPHONE ENCOUNTER
Prior Authorization has been started on Cover My Meds for Topiramate    Waiting on response from insurance       Key:[id number]

## 2024-12-17 NOTE — TELEPHONE ENCOUNTER
topiramate (TOPAMAX) 100 MG tablet   Patient got a letter in the mail stating medication was waiting on a prior auth.

## 2024-12-27 ENCOUNTER — TELEPHONE (OUTPATIENT)
Dept: BEHAVIORAL HEALTH | Facility: CLINIC | Age: 59
End: 2024-12-27
Payer: MEDICARE

## 2024-12-27 DIAGNOSIS — F90.2 ATTENTION DEFICIT HYPERACTIVITY DISORDER, COMBINED TYPE: Primary | ICD-10-CM

## 2024-12-27 NOTE — TELEPHONE ENCOUNTER
Patient says she is taking Adderall XR. She says she is having bad brain fog, cannot recall things, remembering things, relaying things or getting her thoughts put into words. Cannot concentrate. She says this seems to have gotten worse in the last couple weeks. She wondered if increasing the dosage might help. Please advise. Asked to send it to [facility] in [facility]. She is dog sitting for dtr.

## 2024-12-29 RX ORDER — DEXTROAMPHETAMINE SACCHARATE, AMPHETAMINE ASPARTATE MONOHYDRATE, DEXTROAMPHETAMINE SULFATE AND AMPHETAMINE SULFATE 7.5; 7.5; 7.5; 7.5 MG/1; MG/1; MG/1; MG/1
30 CAPSULE, EXTENDED RELEASE ORAL DAILY
Qty: 30 CAPSULE | Refills: 0 | Status: SHIPPED | OUTPATIENT
Start: 2024-12-29

## 2025-01-13 ENCOUNTER — TELEMEDICINE (OUTPATIENT)
Dept: BEHAVIORAL HEALTH | Facility: CLINIC | Age: 60
End: 2025-01-13
Payer: MEDICARE

## 2025-01-13 DIAGNOSIS — F51.04 PSYCHOPHYSIOLOGICAL INSOMNIA: ICD-10-CM

## 2025-01-13 DIAGNOSIS — F41.1 GENERALIZED ANXIETY DISORDER: ICD-10-CM

## 2025-01-13 DIAGNOSIS — F90.2 ATTENTION DEFICIT HYPERACTIVITY DISORDER, COMBINED TYPE: ICD-10-CM

## 2025-01-13 DIAGNOSIS — F31.81 BIPOLAR II DISORDER: Primary | ICD-10-CM

## 2025-01-13 PROCEDURE — 99214 OFFICE O/P EST MOD 30 MIN: CPT

## 2025-01-13 PROCEDURE — 1160F RVW MEDS BY RX/DR IN RCRD: CPT

## 2025-01-13 PROCEDURE — 1159F MED LIST DOCD IN RCRD: CPT

## 2025-01-13 RX ORDER — ZOLPIDEM TARTRATE 10 MG/1
10 TABLET ORAL NIGHTLY PRN
Qty: 30 TABLET | Refills: 1 | Status: SHIPPED | OUTPATIENT
Start: 2025-01-13

## 2025-01-13 RX ORDER — TOPIRAMATE 100 MG/1
100 TABLET, FILM COATED ORAL 2 TIMES DAILY
Qty: 180 TABLET | Refills: 0 | Status: SHIPPED | OUTPATIENT
Start: 2025-01-13

## 2025-01-13 RX ORDER — HYDROXYZINE HYDROCHLORIDE 50 MG/1
50 TABLET, FILM COATED ORAL NIGHTLY
Qty: 90 TABLET | Refills: 0 | Status: SHIPPED | OUTPATIENT
Start: 2025-01-13

## 2025-01-13 RX ORDER — BUSPIRONE HYDROCHLORIDE 10 MG/1
10 TABLET ORAL 3 TIMES DAILY
Qty: 270 TABLET | Refills: 0 | Status: SHIPPED | OUTPATIENT
Start: 2025-01-13

## 2025-01-13 RX ORDER — CITALOPRAM HYDROBROMIDE 20 MG/1
20 TABLET ORAL DAILY
Qty: 90 TABLET | Refills: 0 | Status: SHIPPED | OUTPATIENT
Start: 2025-01-13

## 2025-01-13 RX ORDER — LAMOTRIGINE 200 MG/1
200 TABLET ORAL 2 TIMES DAILY
Qty: 180 TABLET | Refills: 0 | Status: SHIPPED | OUTPATIENT
Start: 2025-01-13

## 2025-01-13 RX ORDER — BUPROPION HYDROCHLORIDE 150 MG/1
150 TABLET ORAL EVERY MORNING
Qty: 90 TABLET | Refills: 0 | Status: SHIPPED | OUTPATIENT
Start: 2025-01-13

## 2025-01-13 NOTE — PROGRESS NOTES
"This provider is located at [facility], Behavioral [facility], [address] [place] [address], [id number],using a secure MyChart Video Visit through [employer]. Patient is being seen remotely via telehealth at their home address in [place], and stated they are in a secure environment for this session. The patient's condition being diagnosed/treated is appropriate for telemedicine. The provider identified herself as well as her credentials.   The patient, and/or patients guardian, consent to be seen remotely, and when consent is given they understand that the consent allows for patient identifiable information to be sent to a third party as needed.   They may refuse to be seen remotely at any time. The electronic data is encrypted and password protected, and the patient and/or guardian has been advised of the potential risks to privacy not withstanding such measures.    Video Visit    Patient Name: Eleanor Preston  : 1965   MRN: [id number]   Care Team: Patient Care Team:  Maritza Cheng DO as PCP - General (Family Medicine)  Lia Marroquin APRN as Nurse Practitioner (Behavioral Health)         Chief Complaint:    Chief Complaint   Patient presents with    ADHD    Bipolar    Anxiety    Sleeping Problem    Med Management       History of Present Illness: Eleanor Preston is a 59 y.o. female who presents via video visit for a medication management follow up. Patient reports that overall things have been \"okay\". She has been struggling with a lot of situational stressors and going through her divorce has been hard, however, she is managing the best she can. Some days are better than others but she is taking a lot of positive steps to move on with her life. She feels that for the most part, her mood and anxiety are managed well and her focus and concentration are doing good. She also reports she is sleeping as well as she can considering the situation. She did not see the need to make any changes " and did not have any medication concerns at today's visit. She denies SI/HI today.     The following portion of the patient's history were reviewed and updated appropriately: allergies, current and past medications, family history, medical history and social history. KIRSTY reviewed and appropriate.   Subjective   Review of Systems:    Review of Systems   Respiratory: Negative.     Cardiovascular: Negative.  Negative for chest pain and palpitations.   Neurological: Negative.    Psychiatric/Behavioral:  Positive for agitation, depressed mood and stress.    All other systems reviewed and are negative.      Current Medications:   Current Outpatient Medications   Medication Sig Dispense Refill    buPROPion XL (WELLBUTRIN XL) 150 MG 24 hr tablet Take 1 tablet by mouth Every Morning. 90 tablet 0    busPIRone (BUSPAR) 10 MG tablet Take 1 tablet by mouth 3 (Three) Times a Day. 270 tablet 0    citalopram (CeleXA) 20 MG tablet Take 1 tablet by mouth Daily. 90 tablet 0    hydrOXYzine (ATARAX) 50 MG tablet Take 1 tablet by mouth Every Night. 90 tablet 0    lamoTRIgine (LaMICtal) 200 MG tablet Take 1 tablet by mouth 2 (Two) Times a Day. 180 tablet 0    topiramate (TOPAMAX) 100 MG tablet Take 1 tablet by mouth 2 (Two) Times a Day. 180 tablet 0    zolpidem (AMBIEN) 10 MG tablet Take 1 tablet by mouth At Night As Needed for Sleep. 30 tablet 1    amphetamine-dextroamphetamine XR (ADDERALL XR) 30 MG 24 hr capsule Take 1 capsule by mouth Daily 30 capsule 0    estradiol (ESTRACE) 2 MG tablet       gabapentin (NEURONTIN) 300 MG capsule Take 1 capsule by mouth.      multivitamin with minerals tablet tablet       rOPINIRole (REQUIP) 0.5 MG tablet TAKE 2 TABLETS BY MOUTH 1 TO 3 HOURS BEFORE BEDTIME      valACYclovir (VALTREX) 500 MG tablet Take 1 tablet by mouth Every 12 (Twelve) Hours.       No current facility-administered medications for this visit.       Mental Status Exam:   Hygiene:    appears to be WNL   Cooperation:   Cooperative  Eye Contact:  Good  Psychomotor Behavior:   appears to be WNL   Affect:  Appropriate  Mood: normal  Speech:  Normal  Thought Process:  Goal directed and Linear  Thought Content:  Normal and Mood congruent  Suicidal:  None  Homicidal:  None  Hallucinations:  None  Delusion:  None  Memory:  Intact  Orientation:  Person, Place, Time, and Situation  Reliability:  good  Insight:  Good  Judgement:  Good  Impulse Control:  Good  Physical/Medical Issues:  Yes see chart       Objective   Vital Signs:   There were no vitals taken for this visit.         BP Readings from Last 3 Encounters:   11/04/24 124/82   05/24/24 139/86   08/04/22 122/72        Pulse Readings from Last 3 Encounters:   11/04/24 75   05/24/24 64   08/04/22 74        Lab Results:     Lab Results   Component Value Date    WBC 5.43 05/24/2024    HGB 14.1 05/24/2024    HCT 41.8 05/24/2024    MCV 93.7 05/24/2024     05/24/2024         Lab Results   Component Value Date    GLUCOSE 95 05/24/2024    BUN 16 05/24/2024    CREATININE 0.98 05/24/2024     05/24/2024    K 4.0 05/24/2024     (H) 05/24/2024    CALCIUM 8.8 05/24/2024    PROTEINTOT 6.2 05/24/2024    ALBUMIN 3.9 05/24/2024    ALT 12 05/24/2024    AST 16 05/24/2024    ALKPHOS 47 05/24/2024    BILITOT 0.3 05/24/2024    GLOB 2.3 05/24/2024    AGRATIO 1.7 05/24/2024    BCR 16.3 05/24/2024    ANIONGAP 7.2 05/24/2024    EGFR 67.0 05/24/2024         Lab Results   Component Value Date    CHOL 164 08/04/2022    CHLPL 186 12/19/2014    TRIG 40 08/04/2022    HDL 69 (H) 08/04/2022    LDL 86 08/04/2022         Lab Results   Component Value Date    HGBA1C 5.10 08/04/2022          Lab Results   Component Value Date    TSH 1.200 08/04/2022         Assessment / Plan    Diagnoses and all orders for this visit:    1. Bipolar II disorder (Primary)  -     buPROPion XL (WELLBUTRIN XL) 150 MG 24 hr tablet; Take 1 tablet by mouth Every Morning.  Dispense: 90 tablet; Refill: 0  -     citalopram  (CeleXA) 20 MG tablet; Take 1 tablet by mouth Daily.  Dispense: 90 tablet; Refill: 0  -     lamoTRIgine (LaMICtal) 200 MG tablet; Take 1 tablet by mouth 2 (Two) Times a Day.  Dispense: 180 tablet; Refill: 0  -     topiramate (TOPAMAX) 100 MG tablet; Take 1 tablet by mouth 2 (Two) Times a Day.  Dispense: 180 tablet; Refill: 0    2. Generalized anxiety disorder  -     buPROPion XL (WELLBUTRIN XL) 150 MG 24 hr tablet; Take 1 tablet by mouth Every Morning.  Dispense: 90 tablet; Refill: 0  -     busPIRone (BUSPAR) 10 MG tablet; Take 1 tablet by mouth 3 (Three) Times a Day.  Dispense: 270 tablet; Refill: 0  -     citalopram (CeleXA) 20 MG tablet; Take 1 tablet by mouth Daily.  Dispense: 90 tablet; Refill: 0  -     hydrOXYzine (ATARAX) 50 MG tablet; Take 1 tablet by mouth Every Night.  Dispense: 90 tablet; Refill: 0  -     topiramate (TOPAMAX) 100 MG tablet; Take 1 tablet by mouth 2 (Two) Times a Day.  Dispense: 180 tablet; Refill: 0    3. Psychophysiological insomnia  -     hydrOXYzine (ATARAX) 50 MG tablet; Take 1 tablet by mouth Every Night.  Dispense: 90 tablet; Refill: 0  -     zolpidem (AMBIEN) 10 MG tablet; Take 1 tablet by mouth At Night As Needed for Sleep.  Dispense: 30 tablet; Refill: 1    4. Attention deficit hyperactivity disorder, combined type      Overall, Patient appears to be doing well on current medication. No issues reported and no indication for change. Will continue medication as ordered.     As part of patient's treatment plan I am prescribing a controlled substance.  The patient has been made aware of the appropriate use of such medications, including potential risk of somnolence, limited ability to drive and/or work safely, and potential for dependence and/or overdose.  It has also been made clear that these medications are for use by this patient only, without concomitant use of alcohol or other substances, unless prescribed.    Patient has completed a prescribing agreement detailing terms of  continued prescribing of controlled substances, including monitoring KIRSTY reports, urine drug screening, and pill counts if necessary.  Patient is aware that inappropriate use will result in cessation of prescribing such medications.    A psychological evaluation was conducted in order to assess past and current level of functioning. Areas assessed included, but were not limited to: perception of social support, perception of ability to face and deal with challenges in life (positive functioning), anxiety symptoms, depressive symptoms, perspective on beliefs/belief system, coping skills for stress, intelligence level,  Therapeutic rapport was established. Interventions conducted today were geared towards incorporating medication management along with support for continued therapy. Education was also provided as to the med management with this provider and what to expect in subsequent sessions.      We discussed risks, benefits, goals and side effects of the above medication and the patient was agreeable with the plan. Patient was educated on the importance of compliance with treatment and follow-up appointments. Patient is aware to contact the [facility] with any worsening of symptoms. To call for questions or concerns and return early if necessary. Patent is agreeable to go to the Emergency Department or call 911 should they begin SI/HI.        MEDS ORDERED DURING VISIT:  New Medications Ordered This Visit   Medications    buPROPion XL (WELLBUTRIN XL) 150 MG 24 hr tablet     Sig: Take 1 tablet by mouth Every Morning.     Dispense:  90 tablet     Refill:  0    busPIRone (BUSPAR) 10 MG tablet     Sig: Take 1 tablet by mouth 3 (Three) Times a Day.     Dispense:  270 tablet     Refill:  0    citalopram (CeleXA) 20 MG tablet     Sig: Take 1 tablet by mouth Daily.     Dispense:  90 tablet     Refill:  0    hydrOXYzine (ATARAX) 50 MG tablet     Sig: Take 1 tablet by mouth Every Night.     Dispense:  90 tablet      Refill:  0    lamoTRIgine (LaMICtal) 200 MG tablet     Sig: Take 1 tablet by mouth 2 (Two) Times a Day.     Dispense:  180 tablet     Refill:  0    topiramate (TOPAMAX) 100 MG tablet     Sig: Take 1 tablet by mouth 2 (Two) Times a Day.     Dispense:  180 tablet     Refill:  0    zolpidem (AMBIEN) 10 MG tablet     Sig: Take 1 tablet by mouth At Night As Needed for Sleep.     Dispense:  30 tablet     Refill:  1         Follow Up   Return in about 5 weeks (around 2/17/2025).  Patient was given instructions and counseling regarding her condition or for health maintenance advice. Please see specific information pulled into the AVS if appropriate.     TREATMENT PLAN/GOALS: Continue supportive psychotherapy efforts and medications as indicated. Treatment and medication options discussed during today's visit. Patient acknowledged and verbally consented to continue with current treatment plan and was educated on the importance of compliance with treatment and follow-up appointments.    MEDICATION ISSUES:  Discussed medication options and treatment plan of prescribed medication as well as the risks, benefits, and side effects including potential falls, possible impaired driving and metabolic adversities among others. Patient is agreeable to call the office with any worsening of symptoms or onset of side effects. Patient is agreeable to call 911 or go to the nearest ER should he/she begin having SI/HI.        [facility]  [address] [place] [address] DR RACHID LOW [id number]  [phone]    January 13, 2025 11:14 EST

## 2025-01-27 ENCOUNTER — TELEPHONE (OUTPATIENT)
Dept: BEHAVIORAL HEALTH | Facility: CLINIC | Age: 60
End: 2025-01-27
Payer: MEDICARE

## 2025-01-27 NOTE — TELEPHONE ENCOUNTER
Will you reach out and get more info. Does she feel like we need to adjust medications?    Thanks!

## 2025-01-27 NOTE — TELEPHONE ENCOUNTER
Discharge Planner SLP   Patient plan for discharge: Home  Current status: A video swallow study was completed this am.  Pt presents with moderate oral-pharyngeal dysphagia per study results.  Findings include oral residue, delayed swallows at times, mild weak base of tongue function, decreased epiglottic closure, and prominent UES with repeated reflux of barium to the pyriform sinuses.  Deficits resulted in min residue in valleculae, mild-mod to mod residue in the pyriform sinuses, and flash penetration with nectar and pudding.  No confirmed penetration with thin liquids with multiple swallow/coughing strategy.  Pt does cough to sensation of residue in the pyriforms and to penetration.  Pt is at risk for aspiration if safe swallow strategies are not used carefully.    Recommend a dysphagia diet level 1 and thin liquids with the following safe swallow strategies:     Sit at 90 degrees, remain upright for 30-60 minutes after eating, small sips, no straw, multiple hard swallows per bite/sip, cough between swallows as needed, alternate textures, small snacks at a sitting, monitor lung status carefully.    Recommend OP clinical SLP swallow Tx follow up to assess diet tolerance, train strategies, complete exercises, and assess safety for upgrades.  RN to provide dysphagia diet level 1 and 2 handouts for home for slow upgrade if significant improvement noted at home.    If deficits do not resolve, consider GI consult for dilation of UES.    Barriers to return to prior living situation: No SLP barriers  Recommendations for discharge: Home with OP SLP swallow Tx  Rationale for recommendations: OP SLP swallow Tx to maximize safety for a regular diet       Entered by: Chen Felix 02/23/2020 10:57 AM       Patient understood and states she will continue her current meds and will see Ana at her follow up.

## 2025-01-27 NOTE — TELEPHONE ENCOUNTER
I do feel that her symptoms are largely related to her current situation. I would like to wait to change anything until our appointment.     Thanks!

## 2025-01-27 NOTE — TELEPHONE ENCOUNTER
Spoke with patient, she made me aware of everything that is going on for her right now and she is struggling with so many ups and downs through the week. Patient states one day she can't help but think of all the negative going on in her life and can't get out of bed because she is so depressed; the next day she up, happy and is so positive about everything. She states she doesn't know if it could be her medications or not, but she is very much open to changing something if that is what Ana thinks would benefit her the most.

## 2025-01-27 NOTE — TELEPHONE ENCOUNTER
PATIENT CALLED N REGARDS TO HER MEDICATIONS ARE MAKING HER HAVE RAPID CYCLING AND SHE HAS BEEN VERY DEPRESSED AND WILL LAST FOR 2-3 DAYS, STATES TODAY SHE IS OKAY BUT SHE IS WANTING TO TALK WITH VESTA OR NURSE ABOUT WHAT IS GOING ON SAYS VESTA KNOWS OF PERSONAL THINGS GOING ON.

## 2025-02-10 ENCOUNTER — TELEMEDICINE (OUTPATIENT)
Dept: BEHAVIORAL HEALTH | Facility: CLINIC | Age: 60
End: 2025-02-10
Payer: MEDICARE

## 2025-02-10 DIAGNOSIS — F41.1 GENERALIZED ANXIETY DISORDER: ICD-10-CM

## 2025-02-10 DIAGNOSIS — F51.04 PSYCHOPHYSIOLOGICAL INSOMNIA: ICD-10-CM

## 2025-02-10 DIAGNOSIS — F31.81 BIPOLAR II DISORDER: ICD-10-CM

## 2025-02-10 DIAGNOSIS — F90.2 ATTENTION DEFICIT HYPERACTIVITY DISORDER, COMBINED TYPE: Primary | ICD-10-CM

## 2025-02-10 RX ORDER — ZOLPIDEM TARTRATE 10 MG/1
10 TABLET ORAL NIGHTLY PRN
Qty: 30 TABLET | Refills: 1 | Status: SHIPPED | OUTPATIENT
Start: 2025-02-10

## 2025-02-10 RX ORDER — DEXTROAMPHETAMINE SACCHARATE, AMPHETAMINE ASPARTATE MONOHYDRATE, DEXTROAMPHETAMINE SULFATE AND AMPHETAMINE SULFATE 7.5; 7.5; 7.5; 7.5 MG/1; MG/1; MG/1; MG/1
30 CAPSULE, EXTENDED RELEASE ORAL DAILY
Qty: 30 CAPSULE | Refills: 0 | Status: SHIPPED | OUTPATIENT
Start: 2025-02-10

## 2025-02-10 NOTE — PROGRESS NOTES
This provider is located at The [facility], Behavioral Health, [address] [place] [address], [facility],using a secure MyChart Video Visit through [employer]. Patient is being seen remotely via telehealth at their home address in [place], and stated they are in a secure environment for this session. The patient's condition being diagnosed/treated is appropriate for telemedicine. The provider identified herself as well as her credentials.   The patient, and/or patients guardian, consent to be seen remotely, and when consent is given they understand that the consent allows for patient identifiable information to be sent to a third party as needed.   They may refuse to be seen remotely at any time. The electronic data is encrypted and password protected, and the patient and/or guardian has been advised of the potential risks to privacy not withstanding such measures.    Video Visit    Patient Name: Eleanor Preston  : 1965   MRN: [id number]   Care Team: Patient Care Team:  Maritza Cheng DO as PCP - General (Family Medicine)  Lia Marroquin APRN as Nurse Practitioner (Behavioral Health)         Chief Complaint:    Chief Complaint   Patient presents with    ADHD    Anxiety    Bipolar    Sleeping Problem    Med Management       History of Present Illness: Eleanor Preston is a 60 y.o. female who presents via video visit for a medication management follow up. Patient reports that overall medication continues to be effective.  She feels that she is doing much better since moving into her new apartment.  She feels that her focus and concentration are doing good and her mood and anxiety have been managed well.  She also reports she is sleeping good. She did not see the need to make any changes and did not have any medication concerns at today's visit. She denies SI/HI today.     The following portion of the patient's history were reviewed and updated appropriately: allergies, current and past  medications, family history, medical history and social history. KIRSTY reviewed and appropriate.   Subjective   Review of Systems:    Review of Systems   Respiratory: Negative.     Cardiovascular:  Negative for chest pain and palpitations.   Neurological: Negative.    Psychiatric/Behavioral: Negative.     All other systems reviewed and are negative.    Current Medications:   Current Outpatient Medications   Medication Sig Dispense Refill    amphetamine-dextroamphetamine XR (ADDERALL XR) 30 MG 24 hr capsule Take 1 capsule by mouth Daily 30 capsule 0    zolpidem (AMBIEN) 10 MG tablet Take 1 tablet by mouth At Night As Needed for Sleep. 30 tablet 1    buPROPion XL (WELLBUTRIN XL) 150 MG 24 hr tablet Take 1 tablet by mouth Every Morning. 90 tablet 0    busPIRone (BUSPAR) 10 MG tablet Take 1 tablet by mouth 3 (Three) Times a Day. 270 tablet 0    citalopram (CeleXA) 20 MG tablet Take 1 tablet by mouth Daily. 90 tablet 0    estradiol (ESTRACE) 2 MG tablet       gabapentin (NEURONTIN) 300 MG capsule Take 1 capsule by mouth.      hydrOXYzine (ATARAX) 50 MG tablet Take 1 tablet by mouth Every Night. 90 tablet 0    lamoTRIgine (LaMICtal) 200 MG tablet Take 1 tablet by mouth 2 (Two) Times a Day. 180 tablet 0    multivitamin with minerals tablet tablet       rOPINIRole (REQUIP) 0.5 MG tablet TAKE 2 TABLETS BY MOUTH 1 TO 3 HOURS BEFORE BEDTIME      topiramate (TOPAMAX) 100 MG tablet Take 1 tablet by mouth 2 (Two) Times a Day. 180 tablet 0    valACYclovir (VALTREX) 500 MG tablet Take 1 tablet by mouth Every 12 (Twelve) Hours.       No current facility-administered medications for this visit.       Mental Status Exam:   Hygiene:    appears to be WNL   Cooperation:  Cooperative  Eye Contact:  Good  Psychomotor Behavior:   appears to be WNL   Affect:  Appropriate  Mood: normal  Speech:  Normal  Thought Process:  Goal directed and Linear  Thought Content:  Normal and Mood congruent  Suicidal:  None  Homicidal:  None  Hallucinations:   None  Delusion:  None  Memory:  Intact  Orientation:  Person, Place, Time, and Situation  Reliability:  good  Insight:  Good  Judgement:  Good  Impulse Control:  Good  Physical/Medical Issues:  Yes see chart       Objective   Vital Signs:   There were no vitals taken for this visit.         BP Readings from Last 3 Encounters:   11/04/24 124/82   05/24/24 139/86   08/04/22 122/72        Pulse Readings from Last 3 Encounters:   11/04/24 75   05/24/24 64   08/04/22 74        Lab Results:     Lab Results   Component Value Date    WBC 5.43 05/24/2024    HGB 14.1 05/24/2024    HCT 41.8 05/24/2024    MCV 93.7 05/24/2024     05/24/2024         Lab Results   Component Value Date    GLUCOSE 95 05/24/2024    BUN 16 05/24/2024    CREATININE 0.98 05/24/2024     05/24/2024    K 4.0 05/24/2024     (H) 05/24/2024    CALCIUM 8.8 05/24/2024    PROTEINTOT 6.2 05/24/2024    ALBUMIN 3.9 05/24/2024    ALT 12 05/24/2024    AST 16 05/24/2024    ALKPHOS 47 05/24/2024    BILITOT 0.3 05/24/2024    GLOB 2.3 05/24/2024    AGRATIO 1.7 05/24/2024    BCR 16.3 05/24/2024    ANIONGAP 7.2 05/24/2024    EGFR 67.0 05/24/2024         Lab Results   Component Value Date    CHOL 164 08/04/2022    CHLPL 186 12/19/2014    TRIG 40 08/04/2022    HDL 69 (H) 08/04/2022    LDL 86 08/04/2022         Lab Results   Component Value Date    HGBA1C 5.10 08/04/2022          Lab Results   Component Value Date    TSH 1.200 08/04/2022         Assessment / Plan    Diagnoses and all orders for this visit:    1. Attention deficit hyperactivity disorder, combined type (Primary)  -     amphetamine-dextroamphetamine XR (ADDERALL XR) 30 MG 24 hr capsule; Take 1 capsule by mouth Daily  Dispense: 30 capsule; Refill: 0    2. Psychophysiological insomnia  -     zolpidem (AMBIEN) 10 MG tablet; Take 1 tablet by mouth At Night As Needed for Sleep.  Dispense: 30 tablet; Refill: 1    3. Bipolar II disorder    4. Generalized anxiety disorder      Patient appears to be  doing well on current medication. No issues reported and no indication for change. Will continue medication as ordered.     Obtained yearly urine drug screen and controlled substance agreement signed.     Patient has completed a prescribing agreement detailing terms of continued prescribing of controlled substances, including monitoring KIRSTY reports, urine drug screening, and pill counts if necessary.  Patient is aware that inappropriate use will result in cessation of prescribing such medications.    At this time, patient is being prescribed a mood stabilizer. The risks, benefits and potential side effects of these medications have been reviewed with the patient/guardian.  I have also discussed with the patient/guardian that while on these medications the following labs should be drawn yearly.  Those labs include, a CBC, a CMP, a lipid panel, A1c, and Thyroid labs.  Encouraged patient/guardian to discuss these labs with their primary care provider.  Patient/guardian verbalized understanding.    A psychological evaluation was conducted in order to assess past and current level of functioning. Areas assessed included, but were not limited to: perception of social support, perception of ability to face and deal with challenges in life (positive functioning), anxiety symptoms, depressive symptoms, perspective on beliefs/belief system, coping skills for stress, intelligence level,  Therapeutic rapport was established. Interventions conducted today were geared towards incorporating medication management along with support for continued therapy. Education was also provided as to the med management with this provider and what to expect in subsequent sessions.      We discussed risks, benefits, goals and side effects of the above medication and the patient was agreeable with the plan. Patient was educated on the importance of compliance with treatment and follow-up appointments. Patient is aware to contact the [facility] with any worsening of symptoms. To call for questions or concerns and return early if necessary. Patent is agreeable to go to the Emergency Department or call 911 should they begin SI/HI.        MEDS ORDERED DURING VISIT:  New Medications Ordered This Visit   Medications    amphetamine-dextroamphetamine XR (ADDERALL XR) 30 MG 24 hr capsule     Sig: Take 1 capsule by mouth Daily     Dispense:  30 capsule     Refill:  0    zolpidem (AMBIEN) 10 MG tablet     Sig: Take 1 tablet by mouth At Night As Needed for Sleep.     Dispense:  30 tablet     Refill:  1         Follow Up   Return in about 8 weeks (around 4/7/2025).  Patient was given instructions and counseling regarding her condition or for health maintenance advice. Please see specific information pulled into the AVS if appropriate.     TREATMENT PLAN/GOALS: Continue supportive psychotherapy efforts and medications as indicated. Treatment and medication options discussed during today's visit. Patient acknowledged and verbally consented to continue with current treatment plan and was educated on the importance of compliance with treatment and follow-up appointments.    MEDICATION ISSUES:  Discussed medication options and treatment plan of prescribed medication as well as the risks, benefits, and side effects including potential falls, possible impaired driving and metabolic adversities among others. Patient is agreeable to call the office with any worsening of symptoms or onset of side effects. Patient is agreeable to call 911 or go to the nearest ER should he/she begin having SI/HI.        DEEPA [facility] BEHAVIORAL HE[facility]  [address] [place] [address]  [phone]    February 10, 2025 11:21 EST

## 2025-03-11 ENCOUNTER — TELEPHONE (OUTPATIENT)
Dept: FAMILY MEDICINE CLINIC | Facility: CLINIC | Age: 60
End: 2025-03-11
Payer: MEDICARE

## 2025-03-11 NOTE — TELEPHONE ENCOUNTER
"Called patient and let her know to start taking two of the Hydroxyzine. Patient did let me know that it's more of a racing thoughts issue, rather than a sleeping issue. Patient states her \"brain just won't shut off.\" Ana still said to try the Hydroxyzine with that extra information. Patient understood and will try that.    "

## 2025-03-11 NOTE — TELEPHONE ENCOUNTER
PT CALLED AND STATED THAT FOR THE PASSED 3 WEEKS SHE'S BEEN WAKING UP SEVERAL TIMES DURING THE NIGHT AND WANT TO KNOW IF SHE NEEDED TO ADJUST HER MEDICATION.

## 2025-03-17 DIAGNOSIS — F90.2 ATTENTION DEFICIT HYPERACTIVITY DISORDER, COMBINED TYPE: ICD-10-CM

## 2025-03-17 RX ORDER — DEXTROAMPHETAMINE SACCHARATE, AMPHETAMINE ASPARTATE MONOHYDRATE, DEXTROAMPHETAMINE SULFATE AND AMPHETAMINE SULFATE 7.5; 7.5; 7.5; 7.5 MG/1; MG/1; MG/1; MG/1
30 CAPSULE, EXTENDED RELEASE ORAL DAILY
Qty: 30 CAPSULE | Refills: 0 | Status: SHIPPED | OUTPATIENT
Start: 2025-03-17

## 2025-03-31 ENCOUNTER — TELEMEDICINE (OUTPATIENT)
Dept: BEHAVIORAL HEALTH | Facility: CLINIC | Age: 60
End: 2025-03-31
Payer: MEDICARE

## 2025-03-31 DIAGNOSIS — F51.04 PSYCHOPHYSIOLOGICAL INSOMNIA: ICD-10-CM

## 2025-03-31 DIAGNOSIS — F31.81 BIPOLAR II DISORDER: Primary | ICD-10-CM

## 2025-03-31 DIAGNOSIS — F41.1 GENERALIZED ANXIETY DISORDER: ICD-10-CM

## 2025-03-31 DIAGNOSIS — F90.2 ATTENTION DEFICIT HYPERACTIVITY DISORDER, COMBINED TYPE: ICD-10-CM

## 2025-03-31 RX ORDER — HYDROXYZINE HYDROCHLORIDE 50 MG/1
50 TABLET, FILM COATED ORAL NIGHTLY
Qty: 90 TABLET | Refills: 0 | Status: SHIPPED | OUTPATIENT
Start: 2025-03-31

## 2025-03-31 RX ORDER — LAMOTRIGINE 200 MG/1
200 TABLET ORAL 2 TIMES DAILY
Qty: 180 TABLET | Refills: 0 | Status: SHIPPED | OUTPATIENT
Start: 2025-03-31

## 2025-03-31 RX ORDER — TOPIRAMATE 100 MG/1
100 TABLET, FILM COATED ORAL 2 TIMES DAILY
Qty: 180 TABLET | Refills: 0 | Status: SHIPPED | OUTPATIENT
Start: 2025-03-31

## 2025-03-31 RX ORDER — ZOLPIDEM TARTRATE 10 MG/1
10 TABLET ORAL NIGHTLY PRN
Qty: 30 TABLET | Refills: 2 | Status: SHIPPED | OUTPATIENT
Start: 2025-03-31

## 2025-03-31 RX ORDER — BUPROPION HYDROCHLORIDE 150 MG/1
150 TABLET ORAL EVERY MORNING
Qty: 90 TABLET | Refills: 0 | Status: SHIPPED | OUTPATIENT
Start: 2025-03-31

## 2025-03-31 RX ORDER — CITALOPRAM HYDROBROMIDE 20 MG/1
20 TABLET ORAL DAILY
Qty: 90 TABLET | Refills: 0 | Status: SHIPPED | OUTPATIENT
Start: 2025-03-31

## 2025-03-31 RX ORDER — BUSPIRONE HYDROCHLORIDE 10 MG/1
10 TABLET ORAL 3 TIMES DAILY
Qty: 270 TABLET | Refills: 0 | Status: SHIPPED | OUTPATIENT
Start: 2025-03-31

## 2025-03-31 NOTE — PROGRESS NOTES
This provider is located at [facility], Behavioral Health, [address] [place] [address], [facility],using a secure MyChart Video Visit through [employer]. Patient is being seen remotely via telehealth at their home address in [place], and stated they are in a secure environment for this session. The patient's condition being diagnosed/treated is appropriate for telemedicine. The provider identified herself as well as her credentials.   The patient, and/or patients guardian, consent to be seen remotely, and when consent is given they understand that the consent allows for patient identifiable information to be sent to a third party as needed.   They may refuse to be seen remotely at any time. The electronic data is encrypted and password protected, and the patient and/or guardian has been advised of the potential risks to privacy not withstanding such measures.    Video Visit    Patient Name: Eleanor Preston  : 1965   MRN: [id number]   Care Team: Patient Care Team:  Maritza Cheng DO as PCP - General (Family Medicine)  Lia Marroquin APRN as Nurse Practitioner (Behavioral Health)         Chief Complaint:    Chief Complaint   Patient presents with    Bipolar     Anxiety    Sleeping Problem    ADHD    Med Management       History of Present Illness: Eleanor Preston is a 60 y.o. female who presents via video visit for a medication management follow up. Patient reports that overall medication continues to be effective. She feels that her focus and concentration are doing good and her mood and and anxiety have been managed well. She also reports that she is sleeping well. She endorses some situational stressors, but feels that she is managing them well. She did not see the need to make any changes and did not have any medication concerns at today's visit. She denies SI/HI today.     The following portion of the patient's history were reviewed and updated appropriately: allergies, current  and past medications, family history, medical history and social history. KIRSTY reviewed and appropriate.   Subjective   Review of Systems:    Review of Systems   Respiratory: Negative.     Cardiovascular: Negative.  Negative for chest pain and palpitations.   Neurological: Negative.    Psychiatric/Behavioral: Negative.  Positive for stress.    All other systems reviewed and are negative.      Current Medications:   Current Outpatient Medications   Medication Sig Dispense Refill    buPROPion XL (WELLBUTRIN XL) 150 MG 24 hr tablet Take 1 tablet by mouth Every Morning. 90 tablet 0    busPIRone (BUSPAR) 10 MG tablet Take 1 tablet by mouth 3 (Three) Times a Day. 270 tablet 0    citalopram (CeleXA) 20 MG tablet Take 1 tablet by mouth Daily. 90 tablet 0    hydrOXYzine (ATARAX) 50 MG tablet Take 1 tablet by mouth Every Night. 90 tablet 0    lamoTRIgine (LaMICtal) 200 MG tablet Take 1 tablet by mouth 2 (Two) Times a Day. 180 tablet 0    topiramate (TOPAMAX) 100 MG tablet Take 1 tablet by mouth 2 (Two) Times a Day. 180 tablet 0    zolpidem (AMBIEN) 10 MG tablet Take 1 tablet by mouth At Night As Needed for Sleep. 30 tablet 2    amphetamine-dextroamphetamine XR (ADDERALL XR) 30 MG 24 hr capsule Take 1 capsule by mouth Daily 30 capsule 0    estradiol (ESTRACE) 2 MG tablet       gabapentin (NEURONTIN) 300 MG capsule Take 1 capsule by mouth.      multivitamin with minerals tablet tablet       rOPINIRole (REQUIP) 0.5 MG tablet TAKE 2 TABLETS BY MOUTH 1 TO 3 HOURS BEFORE BEDTIME      valACYclovir (VALTREX) 500 MG tablet Take 1 tablet by mouth Every 12 (Twelve) Hours.       No current facility-administered medications for this visit.       Mental Status Exam:   Hygiene:    appears to be WNL   Cooperation:  Cooperative  Eye Contact:  Good  Psychomotor Behavior:   appears to be WNL   Affect:  Appropriate  Mood: normal  Speech:  Normal  Thought Process:  Goal directed and Linear  Thought Content:  Normal and Mood congruent  Suicidal:   None  Homicidal:  None  Hallucinations:  None  Delusion:  None  Memory:  Intact  Orientation:  Person, Place, Time, and Situation  Reliability:  good  Insight:  Good  Judgement:  Good  Impulse Control:  Good  Physical/Medical Issues:  Yes see chart       Objective   Vital Signs:   There were no vitals taken for this visit.         BP Readings from Last 3 Encounters:   11/04/24 124/82   05/24/24 139/86   08/04/22 122/72        Pulse Readings from Last 3 Encounters:   11/04/24 75   05/24/24 64   08/04/22 74        Lab Results:     Lab Results   Component Value Date    WBC 5.43 05/24/2024    HGB 14.1 05/24/2024    HCT 41.8 05/24/2024    MCV 93.7 05/24/2024     05/24/2024         Lab Results   Component Value Date    GLUCOSE 95 05/24/2024    BUN 16 05/24/2024    CREATININE 0.98 05/24/2024     05/24/2024    K 4.0 05/24/2024     (H) 05/24/2024    CALCIUM 8.8 05/24/2024    PROTEINTOT 6.2 05/24/2024    ALBUMIN 3.9 05/24/2024    ALT 12 05/24/2024    AST 16 05/24/2024    ALKPHOS 47 05/24/2024    BILITOT 0.3 05/24/2024    GLOB 2.3 05/24/2024    AGRATIO 1.7 05/24/2024    BCR 16.3 05/24/2024    ANIONGAP 7.2 05/24/2024    EGFR 67.0 05/24/2024         Lab Results   Component Value Date    CHOL 164 08/04/2022    CHLPL 186 12/19/2014    TRIG 40 08/04/2022    HDL 69 (H) 08/04/2022    LDL 86 08/04/2022         Lab Results   Component Value Date    HGBA1C 5.10 08/04/2022          Lab Results   Component Value Date    TSH 1.200 08/04/2022         Assessment / Plan    Diagnoses and all orders for this visit:    1. Bipolar II disorder (Primary)  -     buPROPion XL (WELLBUTRIN XL) 150 MG 24 hr tablet; Take 1 tablet by mouth Every Morning.  Dispense: 90 tablet; Refill: 0  -     citalopram (CeleXA) 20 MG tablet; Take 1 tablet by mouth Daily.  Dispense: 90 tablet; Refill: 0  -     lamoTRIgine (LaMICtal) 200 MG tablet; Take 1 tablet by mouth 2 (Two) Times a Day.  Dispense: 180 tablet; Refill: 0  -     topiramate (TOPAMAX)  100 MG tablet; Take 1 tablet by mouth 2 (Two) Times a Day.  Dispense: 180 tablet; Refill: 0    2. Generalized anxiety disorder  -     buPROPion XL (WELLBUTRIN XL) 150 MG 24 hr tablet; Take 1 tablet by mouth Every Morning.  Dispense: 90 tablet; Refill: 0  -     busPIRone (BUSPAR) 10 MG tablet; Take 1 tablet by mouth 3 (Three) Times a Day.  Dispense: 270 tablet; Refill: 0  -     citalopram (CeleXA) 20 MG tablet; Take 1 tablet by mouth Daily.  Dispense: 90 tablet; Refill: 0  -     hydrOXYzine (ATARAX) 50 MG tablet; Take 1 tablet by mouth Every Night.  Dispense: 90 tablet; Refill: 0  -     topiramate (TOPAMAX) 100 MG tablet; Take 1 tablet by mouth 2 (Two) Times a Day.  Dispense: 180 tablet; Refill: 0    3. Psychophysiological insomnia  -     hydrOXYzine (ATARAX) 50 MG tablet; Take 1 tablet by mouth Every Night.  Dispense: 90 tablet; Refill: 0  -     zolpidem (AMBIEN) 10 MG tablet; Take 1 tablet by mouth At Night As Needed for Sleep.  Dispense: 30 tablet; Refill: 2    4. Attention deficit hyperactivity disorder, combined type    Patient appears to be doing well on current medication. No issues reported and no indication for change. Will continue medication as ordered.     As part of patient's treatment plan I am prescribing a controlled substance.  The patient has been made aware of the appropriate use of such medications, including potential risk of somnolence, limited ability to drive and/or work safely, and potential for dependence and/or overdose.  It has also been made clear that these medications are for use by this patient only, without concomitant use of alcohol or other substances, unless prescribed.    Patient/guardian has completed a prescribing agreement detailing terms of continued prescribing of controlled substances, including monitoring KIRSTY reports, urine drug screening, and pill counts if necessary.  Patient is aware that inappropriate use will result in cessation of prescribing such  medications.    At this time, patient is being prescribed a mood stabilizer. The risks, benefits and potential side effects of these medications have been reviewed with the patient/guardian.  I have also discussed with the patient/guardian that while on these medications the following labs should be drawn yearly.  Those labs include, a CBC, a CMP, a lipid panel, A1c, and Thyroid labs.  Encouraged patient/guardian to discuss these labs with their primary care provider.     A psychological evaluation was conducted in order to assess past and current level of functioning. Areas assessed included, but were not limited to: perception of social support, perception of ability to face and deal with challenges in life (positive functioning), anxiety symptoms, depressive symptoms, perspective on beliefs/belief system, coping skills for stress, intelligence level,  Therapeutic rapport was established. Interventions conducted today were geared towards incorporating medication management along with support for continued therapy. Education was also provided as to the med management with this provider and what to expect in subsequent sessions.      We discussed risks, benefits, goals and side effects of the above medication and the patient was agreeable with the plan. Patient was educated on the importance of compliance with treatment and follow-up appointments. Patient is aware to contact the [facility] with any worsening of symptoms. To call for questions or concerns and return early if necessary. Patent is agreeable to go to the Emergency Department or call 911 should they begin SI/HI.        MEDS ORDERED DURING VISIT:  New Medications Ordered This Visit   Medications    buPROPion XL (WELLBUTRIN XL) 150 MG 24 hr tablet     Sig: Take 1 tablet by mouth Every Morning.     Dispense:  90 tablet     Refill:  0    busPIRone (BUSPAR) 10 MG tablet     Sig: Take 1 tablet by mouth 3 (Three) Times a Day.     Dispense:  270 tablet      Refill:  0    citalopram (CeleXA) 20 MG tablet     Sig: Take 1 tablet by mouth Daily.     Dispense:  90 tablet     Refill:  0    hydrOXYzine (ATARAX) 50 MG tablet     Sig: Take 1 tablet by mouth Every Night.     Dispense:  90 tablet     Refill:  0    lamoTRIgine (LaMICtal) 200 MG tablet     Sig: Take 1 tablet by mouth 2 (Two) Times a Day.     Dispense:  180 tablet     Refill:  0    topiramate (TOPAMAX) 100 MG tablet     Sig: Take 1 tablet by mouth 2 (Two) Times a Day.     Dispense:  180 tablet     Refill:  0    zolpidem (AMBIEN) 10 MG tablet     Sig: Take 1 tablet by mouth At Night As Needed for Sleep.     Dispense:  30 tablet     Refill:  2         Follow Up   Return in about 3 months (around 6/30/2025).  Patient was given instructions and counseling regarding her condition or for health maintenance advice. Please see specific information pulled into the AVS if appropriate.     TREATMENT PLAN/GOALS: Continue supportive psychotherapy efforts and medications as indicated. Treatment and medication options discussed during today's visit. Patient acknowledged and verbally consented to continue with current treatment plan and was educated on the importance of compliance with treatment and follow-up appointments.    MEDICATION ISSUES:  Discussed medication options and treatment plan of prescribed medication as well as the risks, benefits, and side effects including potential falls, possible impaired driving and metabolic adversities among others. Patient is agreeable to call the office with any worsening of symptoms or onset of side effects. Patient is agreeable to call 911 or go to the nearest ER should he/she begin having SI/HI.        DEEPA Webster  [facility] BEHAVIORAL HE[facility]  [address] [place] [address] [address] [place] [address]  [phone]    March 31, 2025 11:37 EDT

## 2025-04-08 ENCOUNTER — TELEPHONE (OUTPATIENT)
Dept: BEHAVIORAL HEALTH | Facility: CLINIC | Age: 60
End: 2025-04-08
Payer: MEDICARE

## 2025-04-08 NOTE — TELEPHONE ENCOUNTER
Called patient to get more information from her. She did not answer. LVM telling her to give me a call back when she could.

## 2025-04-08 NOTE — TELEPHONE ENCOUNTER
Patient called with concerns about her memory loss. She states she is on a lot of medication prescribed by stephon and is wondering if it could be one of those medications that cause severe memory loss. Patient says it is driving her crazy because she is forgetting someones name 30 seconds after they tell her or when she needs to remember a number she will look at the number and like 5 seconds after she said she will forget the number. Patient seemed very concerned and a little worrisome. She would like to speak with nate nurse. I did let patient know stephon is not in office this week.

## 2025-04-09 NOTE — TELEPHONE ENCOUNTER
Tried to return patients call, went straight to voicemail. LVM will call her as soon as she calls back.

## 2025-04-21 DIAGNOSIS — F90.2 ATTENTION DEFICIT HYPERACTIVITY DISORDER, COMBINED TYPE: ICD-10-CM

## 2025-04-21 RX ORDER — DEXTROAMPHETAMINE SACCHARATE, AMPHETAMINE ASPARTATE MONOHYDRATE, DEXTROAMPHETAMINE SULFATE AND AMPHETAMINE SULFATE 7.5; 7.5; 7.5; 7.5 MG/1; MG/1; MG/1; MG/1
30 CAPSULE, EXTENDED RELEASE ORAL DAILY
Qty: 30 CAPSULE | Refills: 0 | Status: SHIPPED | OUTPATIENT
Start: 2025-04-21

## 2025-04-21 NOTE — TELEPHONE ENCOUNTER
Rx Refill Note  Requested Prescriptions     Pending Prescriptions Disp Refills    amphetamine-dextroamphetamine XR (ADDERALL XR) 30 MG 24 hr capsule 30 capsule 0     Sig: Take 1 capsule by mouth Daily      Last office visit with prescribing clinician: 11/4/2024   Last telemedicine visit with prescribing clinician: 3/31/2025   Next office visit with prescribing clinician: 6/30/2025                         Would you like a call back once the refill request has been completed: [] Yes [] No    If the office needs to give you a call back, can they leave a voicemail: [] Yes [] No    Domitila Parbhakar MA  04/21/25, 12:51 EDT

## 2025-04-28 DIAGNOSIS — F41.1 GENERALIZED ANXIETY DISORDER: ICD-10-CM

## 2025-04-28 DIAGNOSIS — F31.81 BIPOLAR II DISORDER: ICD-10-CM

## 2025-04-28 RX ORDER — TOPIRAMATE 100 MG/1
100 TABLET, FILM COATED ORAL 2 TIMES DAILY
Qty: 180 TABLET | Refills: 3 | Status: SHIPPED | OUTPATIENT
Start: 2025-04-28

## 2025-05-10 DIAGNOSIS — F31.81 BIPOLAR II DISORDER: ICD-10-CM

## 2025-05-10 DIAGNOSIS — F41.1 GENERALIZED ANXIETY DISORDER: ICD-10-CM

## 2025-05-12 RX ORDER — CITALOPRAM HYDROBROMIDE 20 MG/1
20 TABLET ORAL DAILY
Qty: 90 TABLET | Refills: 3 | Status: SHIPPED | OUTPATIENT
Start: 2025-05-12

## 2025-05-12 RX ORDER — BUPROPION HYDROCHLORIDE 150 MG/1
150 TABLET ORAL EVERY MORNING
Qty: 90 TABLET | Refills: 3 | Status: SHIPPED | OUTPATIENT
Start: 2025-05-12

## 2025-05-12 RX ORDER — BUSPIRONE HYDROCHLORIDE 10 MG/1
10 TABLET ORAL 3 TIMES DAILY
Qty: 270 TABLET | Refills: 3 | Status: SHIPPED | OUTPATIENT
Start: 2025-05-12

## 2025-05-12 RX ORDER — LAMOTRIGINE 200 MG/1
200 TABLET ORAL 2 TIMES DAILY
Qty: 180 TABLET | Refills: 3 | Status: SHIPPED | OUTPATIENT
Start: 2025-05-12

## 2025-05-27 DIAGNOSIS — F90.2 ATTENTION DEFICIT HYPERACTIVITY DISORDER, COMBINED TYPE: ICD-10-CM

## 2025-05-27 RX ORDER — DEXTROAMPHETAMINE SACCHARATE, AMPHETAMINE ASPARTATE MONOHYDRATE, DEXTROAMPHETAMINE SULFATE AND AMPHETAMINE SULFATE 7.5; 7.5; 7.5; 7.5 MG/1; MG/1; MG/1; MG/1
30 CAPSULE, EXTENDED RELEASE ORAL DAILY
Qty: 30 CAPSULE | Refills: 0 | Status: SHIPPED | OUTPATIENT
Start: 2025-05-27

## 2025-05-27 NOTE — TELEPHONE ENCOUNTER
Rx Refill Note  Requested Prescriptions     Pending Prescriptions Disp Refills    amphetamine-dextroamphetamine XR (ADDERALL XR) 30 MG 24 hr capsule 30 capsule 0     Sig: Take 1 capsule by mouth Daily      Last office visit with prescribing clinician: 11/4/2024   Last telemedicine visit with prescribing clinician: 3/31/2025   Next office visit with prescribing clinician: 6/30/2025                         Would you like a call back once the refill request has been completed: [] Yes [] No    If the office needs to give you a call back, can they leave a voicemail: [] Yes [] No    Domitila Prabhakar MA  05/27/25, 10:32 EDT

## 2025-06-30 ENCOUNTER — TELEMEDICINE (OUTPATIENT)
Dept: BEHAVIORAL HEALTH | Facility: CLINIC | Age: 60
End: 2025-06-30
Payer: MEDICARE

## 2025-06-30 DIAGNOSIS — F41.1 GENERALIZED ANXIETY DISORDER: ICD-10-CM

## 2025-06-30 DIAGNOSIS — F90.2 ATTENTION DEFICIT HYPERACTIVITY DISORDER, COMBINED TYPE: Primary | ICD-10-CM

## 2025-06-30 DIAGNOSIS — F31.81 BIPOLAR II DISORDER: ICD-10-CM

## 2025-06-30 DIAGNOSIS — F51.04 PSYCHOPHYSIOLOGICAL INSOMNIA: ICD-10-CM

## 2025-06-30 PROCEDURE — 1159F MED LIST DOCD IN RCRD: CPT

## 2025-06-30 PROCEDURE — 1160F RVW MEDS BY RX/DR IN RCRD: CPT

## 2025-06-30 PROCEDURE — 99214 OFFICE O/P EST MOD 30 MIN: CPT

## 2025-06-30 RX ORDER — LAMOTRIGINE 200 MG/1
200 TABLET ORAL 2 TIMES DAILY
Qty: 180 TABLET | Refills: 0 | Status: SHIPPED | OUTPATIENT
Start: 2025-06-30

## 2025-06-30 RX ORDER — DEXTROAMPHETAMINE SACCHARATE, AMPHETAMINE ASPARTATE MONOHYDRATE, DEXTROAMPHETAMINE SULFATE AND AMPHETAMINE SULFATE 7.5; 7.5; 7.5; 7.5 MG/1; MG/1; MG/1; MG/1
30 CAPSULE, EXTENDED RELEASE ORAL DAILY
Qty: 30 CAPSULE | Refills: 0 | Status: SHIPPED | OUTPATIENT
Start: 2025-06-30

## 2025-06-30 RX ORDER — TOPIRAMATE 100 MG/1
100 TABLET, FILM COATED ORAL 2 TIMES DAILY
Qty: 180 TABLET | Refills: 0 | Status: SHIPPED | OUTPATIENT
Start: 2025-06-30

## 2025-06-30 RX ORDER — BUPROPION HYDROCHLORIDE 150 MG/1
150 TABLET ORAL EVERY MORNING
Qty: 90 TABLET | Refills: 0 | Status: SHIPPED | OUTPATIENT
Start: 2025-06-30

## 2025-06-30 RX ORDER — ZOLPIDEM TARTRATE 5 MG/1
5 TABLET ORAL NIGHTLY PRN
Qty: 30 TABLET | Refills: 1 | Status: SHIPPED | OUTPATIENT
Start: 2025-06-30

## 2025-06-30 RX ORDER — CITALOPRAM HYDROBROMIDE 10 MG/1
10 TABLET ORAL DAILY
Qty: 90 TABLET | Refills: 0 | Status: SHIPPED | OUTPATIENT
Start: 2025-06-30

## 2025-06-30 RX ORDER — BUSPIRONE HYDROCHLORIDE 10 MG/1
10 TABLET ORAL 3 TIMES DAILY
Qty: 270 TABLET | Refills: 0 | Status: SHIPPED | OUTPATIENT
Start: 2025-06-30

## 2025-06-30 RX ORDER — HYDROXYZINE HYDROCHLORIDE 50 MG/1
50 TABLET, FILM COATED ORAL NIGHTLY
Qty: 90 TABLET | Refills: 0 | Status: SHIPPED | OUTPATIENT
Start: 2025-06-30

## 2025-06-30 NOTE — PROGRESS NOTES
This provider is located at [facility], Behavioral Health, [address] [place] [address], [facility],using a secure MyChart Video Visit through [employer]. Patient is being seen remotely via telehealth at their home address in [place], and stated they are in a secure environment for this session. The patient's condition being diagnosed/treated is appropriate for telemedicine. The provider identified herself as well as her credentials.   The patient, and/or patients guardian, consent to be seen remotely, and when consent is given they understand that the consent allows for patient identifiable information to be sent to a third party as needed.   They may refuse to be seen remotely at any time. The electronic data is encrypted and password protected, and the patient and/or guardian has been advised of the potential risks to privacy not withstanding such measures.    Video Visit    Patient Name: Eleanor Preston  : 1965   MRN: [id number]   Care Team: Patient Care Team:  Maritza Cheng DO as PCP - General (Family Medicine)  Lia Marroquin APRN as Nurse Practitioner (Behavioral Health)         Chief Complaint:    Chief Complaint   Patient presents with    ADHD    Anxiety    Sleeping Problem    Bipolar    Med Management       History of Present Illness: Eleanor Preston is a 60 y.o. female who presents via video visit for a medication management follow up.  Patient reports that overall medication continues to be effective.  She has not her focus and concentration are doing good and her mood and anxiety have been managed well.  For the most part she feels that she is sleeping well, however she does endorse that there are times where she will struggle with sleep.  When this happens she will take Ambien as needed in an effort to get back on track.  She does not feel that she needs her Ambien nightly and is helping to decrease her dose as she only takes it as needed.  She is also wondering about  decreasing doses of other medications as she gets older.  She is worried about slow her metabolism and memory issues that prolonged use of these medications might cause. She denies SI/HI today.     The following portion of the patient's history were reviewed and updated appropriately: allergies, current and past medications, family history, medical history and social history. KIRSTY reviewed and appropriate.   Subjective   Review of Systems:    Review of Systems   Respiratory: Negative.     Cardiovascular: Negative.  Negative for chest pain and palpitations.   Neurological: Negative.    Psychiatric/Behavioral:  Positive for sleep disturbance and stress.    All other systems reviewed and are negative.      Current Medications:   Current Outpatient Medications   Medication Sig Dispense Refill    amphetamine-dextroamphetamine XR (ADDERALL XR) 30 MG 24 hr capsule Take 1 capsule by mouth Daily 30 capsule 0    buPROPion XL (WELLBUTRIN XL) 150 MG 24 hr tablet Take 1 tablet by mouth Every Morning. 90 tablet 0    busPIRone (BUSPAR) 10 MG tablet Take 1 tablet by mouth 3 (Three) Times a Day. 270 tablet 0    hydrOXYzine (ATARAX) 50 MG tablet Take 1 tablet by mouth Every Night. 90 tablet 0    lamoTRIgine (LaMICtal) 200 MG tablet Take 1 tablet by mouth 2 (Two) Times a Day. 180 tablet 0    topiramate (TOPAMAX) 100 MG tablet Take 1 tablet by mouth 2 (Two) Times a Day. 180 tablet 0    citalopram (CeleXA) 10 MG tablet Take 1 tablet by mouth Daily. 90 tablet 0    estradiol (ESTRACE) 2 MG tablet       gabapentin (NEURONTIN) 300 MG capsule Take 1 capsule by mouth.      multivitamin with minerals tablet tablet       rOPINIRole (REQUIP) 0.5 MG tablet TAKE 2 TABLETS BY MOUTH 1 TO 3 HOURS BEFORE BEDTIME      valACYclovir (VALTREX) 500 MG tablet Take 1 tablet by mouth Every 12 (Twelve) Hours.      zolpidem (Ambien) 5 MG tablet Take 1 tablet by mouth At Night As Needed for Sleep. 30 tablet 1     No current facility-administered medications  for this visit.       Mental Status Exam:   Hygiene:   appears to be WNL  Cooperation:  Cooperative  Eye Contact:  Good  Psychomotor Behavior:  appears to be WNL  Affect:  Appropriate  Mood: normal  Speech:  Normal  Thought Process:  Goal directed and Linear  Thought Content:  Normal and Mood congruent  Suicidal:  None  Homicidal:  None  Hallucinations:  None  Delusion:  None  Memory:  Intact  Orientation:  Person, Place, Time, and Situation  Reliability:  good  Insight:  Good  Judgement:  Good  Impulse Control:  Good  Physical/Medical Issues:  Yes see chart     Objective   Vital Signs:   There were no vitals taken for this visit.         BP Readings from Last 3 Encounters:   11/04/24 124/82   05/24/24 139/86   08/04/22 122/72        Pulse Readings from Last 3 Encounters:   11/04/24 75   05/24/24 64   08/04/22 74        Lab Results:     Lab Results   Component Value Date    WBC 5.43 05/24/2024    HGB 14.1 05/24/2024    HCT 41.8 05/24/2024    MCV 93.7 05/24/2024     05/24/2024         Lab Results   Component Value Date    GLUCOSE 95 05/24/2024    BUN 16 05/24/2024    CREATININE 0.98 05/24/2024     05/24/2024    K 4.0 05/24/2024     (H) 05/24/2024    CALCIUM 8.8 05/24/2024    PROTEINTOT 6.2 05/24/2024    ALBUMIN 3.9 05/24/2024    ALT 12 05/24/2024    AST 16 05/24/2024    ALKPHOS 47 05/24/2024    BILITOT 0.3 05/24/2024    GLOB 2.3 05/24/2024    AGRATIO 1.7 05/24/2024    BCR 16.3 05/24/2024    ANIONGAP 7.2 05/24/2024    EGFR 67.0 05/24/2024         Lab Results   Component Value Date    CHOL 164 08/04/2022    CHLPL 186 12/19/2014    TRIG 40 08/04/2022    HDL 69 (H) 08/04/2022    LDL 86 08/04/2022         Lab Results   Component Value Date    HGBA1C 5.10 08/04/2022          Lab Results   Component Value Date    TSH 1.200 08/04/2022         Assessment / Plan    Diagnoses and all orders for this visit:    1. Attention deficit hyperactivity disorder, combined type (Primary)  -      amphetamine-dextroamphetamine XR (ADDERALL XR) 30 MG 24 hr capsule; Take 1 capsule by mouth Daily  Dispense: 30 capsule; Refill: 0    2. Psychophysiological insomnia  -     zolpidem (Ambien) 5 MG tablet; Take 1 tablet by mouth At Night As Needed for Sleep.  Dispense: 30 tablet; Refill: 1  -     hydrOXYzine (ATARAX) 50 MG tablet; Take 1 tablet by mouth Every Night.  Dispense: 90 tablet; Refill: 0    3. Generalized anxiety disorder  -     citalopram (CeleXA) 10 MG tablet; Take 1 tablet by mouth Daily.  Dispense: 90 tablet; Refill: 0  -     busPIRone (BUSPAR) 10 MG tablet; Take 1 tablet by mouth 3 (Three) Times a Day.  Dispense: 270 tablet; Refill: 0  -     hydrOXYzine (ATARAX) 50 MG tablet; Take 1 tablet by mouth Every Night.  Dispense: 90 tablet; Refill: 0  -     topiramate (TOPAMAX) 100 MG tablet; Take 1 tablet by mouth 2 (Two) Times a Day.  Dispense: 180 tablet; Refill: 0  -     buPROPion XL (WELLBUTRIN XL) 150 MG 24 hr tablet; Take 1 tablet by mouth Every Morning.  Dispense: 90 tablet; Refill: 0    4. Bipolar II disorder  -     lamoTRIgine (LaMICtal) 200 MG tablet; Take 1 tablet by mouth 2 (Two) Times a Day.  Dispense: 180 tablet; Refill: 0  -     topiramate (TOPAMAX) 100 MG tablet; Take 1 tablet by mouth 2 (Two) Times a Day.  Dispense: 180 tablet; Refill: 0  -     buPROPion XL (WELLBUTRIN XL) 150 MG 24 hr tablet; Take 1 tablet by mouth Every Morning.  Dispense: 90 tablet; Refill: 0    Will decrease Celexa and Ambien. Continue all other medication as ordered.     As part of patient's treatment plan I am prescribing a controlled substance.  The patient has been made aware of the appropriate use of such medications, including potential risk of somnolence, limited ability to drive and/or work safely, and potential for dependence and/or overdose.  It has also been made clear that these medications are for use by this patient only, without concomitant use of alcohol or other substances, unless  prescribed.    Patient/guardian has completed a prescribing agreement detailing terms of continued prescribing of controlled substances, including monitoring KIRSTY reports, urine drug screening, and pill counts if necessary.  Patient is aware that inappropriate use will result in cessation of prescribing such medications.    At this time, patient is being prescribed a mood stabilizer. The risks, benefits and potential side effects of these medications have been reviewed with the patient/guardian.  I have also discussed with the patient/guardian that while on these medications the following labs should be drawn yearly.  Those labs include, a CBC, a CMP, a lipid panel, A1c, and Thyroid labs.  Encouraged patient/guardian to discuss these labs with their primary care provider.       A psychological evaluation was conducted in order to assess past and current level of functioning. Areas assessed included, but were not limited to: perception of social support, perception of ability to face and deal with challenges in life (positive functioning), anxiety symptoms, depressive symptoms, perspective on beliefs/belief system, coping skills for stress, intelligence level,  Therapeutic rapport was established. Interventions conducted today were geared towards incorporating medication management along with support for continued therapy. Education was also provided as to the med management with this provider and what to expect in subsequent sessions.      We discussed risks, benefits, goals and side effects of the above medication and the patient was agreeable with the plan. Patient was educated on the importance of compliance with treatment and follow-up appointments. Patient is aware to contact the [facility] with any worsening of symptoms. To call for questions or concerns and return early if necessary. Patent is agreeable to go to the Emergency Department or call 911 should they begin SI/HI.        MEDS ORDERED DURING  VISIT:  New Medications Ordered This Visit   Medications    zolpidem (Ambien) 5 MG tablet     Sig: Take 1 tablet by mouth At Night As Needed for Sleep.     Dispense:  30 tablet     Refill:  1    amphetamine-dextroamphetamine XR (ADDERALL XR) 30 MG 24 hr capsule     Sig: Take 1 capsule by mouth Daily     Dispense:  30 capsule     Refill:  0    citalopram (CeleXA) 10 MG tablet     Sig: Take 1 tablet by mouth Daily.     Dispense:  90 tablet     Refill:  0    busPIRone (BUSPAR) 10 MG tablet     Sig: Take 1 tablet by mouth 3 (Three) Times a Day.     Dispense:  270 tablet     Refill:  0    hydrOXYzine (ATARAX) 50 MG tablet     Sig: Take 1 tablet by mouth Every Night.     Dispense:  90 tablet     Refill:  0    lamoTRIgine (LaMICtal) 200 MG tablet     Sig: Take 1 tablet by mouth 2 (Two) Times a Day.     Dispense:  180 tablet     Refill:  0    topiramate (TOPAMAX) 100 MG tablet     Sig: Take 1 tablet by mouth 2 (Two) Times a Day.     Dispense:  180 tablet     Refill:  0    buPROPion XL (WELLBUTRIN XL) 150 MG 24 hr tablet     Sig: Take 1 tablet by mouth Every Morning.     Dispense:  90 tablet     Refill:  0         Follow Up   Return in about 8 weeks (around 8/25/2025).  Patient was given instructions and counseling regarding her condition or for health maintenance advice. Please see specific information pulled into the AVS if appropriate.     TREATMENT PLAN/GOALS: Continue supportive psychotherapy efforts and medications as indicated. Treatment and medication options discussed during today's visit. Patient acknowledged and verbally consented to continue with current treatment plan and was educated on the importance of compliance with treatment and follow-up appointments.    MEDICATION ISSUES:  Discussed medication options and treatment plan of prescribed medication as well as the risks, benefits, and side effects including potential falls, possible impaired driving and metabolic adversities among others. Patient is agreeable  to call the office with any worsening of symptoms or onset of side effects. Patient is agreeable to call 911 or go to the nearest ER should he/she begin having SI/HI.        [facility]  [address] [place] [address] DR RACHID LOW [id number]  [phone]    June 30, 2025 11:57 EDT

## 2025-07-14 DIAGNOSIS — F41.1 GENERALIZED ANXIETY DISORDER: ICD-10-CM

## 2025-07-22 ENCOUNTER — TELEPHONE (OUTPATIENT)
Dept: BEHAVIORAL HEALTH | Facility: CLINIC | Age: 60
End: 2025-07-22
Payer: MEDICARE

## 2025-07-22 ENCOUNTER — PRIOR AUTHORIZATION (OUTPATIENT)
Dept: BEHAVIORAL HEALTH | Facility: CLINIC | Age: 60
End: 2025-07-22
Payer: MEDICARE

## 2025-07-29 ENCOUNTER — TELEPHONE (OUTPATIENT)
Dept: BEHAVIORAL HEALTH | Facility: CLINIC | Age: 60
End: 2025-07-29
Payer: MEDICARE

## 2025-07-29 NOTE — TELEPHONE ENCOUNTER
Patient said that was okay. She said she should be fine until her appointment but if she needs anything between now and then she will call.

## 2025-07-29 NOTE — TELEPHONE ENCOUNTER
Might be a good time to take a break from her Adderall. If she is open to it she can take it every other day for 7 days then Stop. Then we can discuss other options at her follow up.    Thanks!

## 2025-07-29 NOTE — TELEPHONE ENCOUNTER
HAVING TROUBLE WITH BRAIN FOG, MEMORY IS BAD, BEING TIRED ALL THE TIME. THINK IT MAY BE ON OF HER MEDICATIONS. DOESN'T KNOW IF THERE IS A MEDICATION OR WHAT IS CAUSING IT.

## 2025-08-11 ENCOUNTER — TELEPHONE (OUTPATIENT)
Dept: BEHAVIORAL HEALTH | Facility: CLINIC | Age: 60
End: 2025-08-11
Payer: MEDICARE

## 2025-08-25 ENCOUNTER — TELEMEDICINE (OUTPATIENT)
Dept: BEHAVIORAL HEALTH | Facility: CLINIC | Age: 60
End: 2025-08-25
Payer: MEDICARE

## 2025-08-25 DIAGNOSIS — F41.1 GENERALIZED ANXIETY DISORDER: ICD-10-CM

## 2025-08-25 DIAGNOSIS — F51.04 PSYCHOPHYSIOLOGICAL INSOMNIA: ICD-10-CM

## 2025-08-25 DIAGNOSIS — F31.81 BIPOLAR II DISORDER: ICD-10-CM

## 2025-08-25 DIAGNOSIS — F90.2 ATTENTION DEFICIT HYPERACTIVITY DISORDER, COMBINED TYPE: Primary | ICD-10-CM

## 2025-08-25 PROCEDURE — 1159F MED LIST DOCD IN RCRD: CPT

## 2025-08-25 PROCEDURE — 1160F RVW MEDS BY RX/DR IN RCRD: CPT

## 2025-08-25 PROCEDURE — 99214 OFFICE O/P EST MOD 30 MIN: CPT

## 2025-08-25 RX ORDER — HYDROXYZINE HYDROCHLORIDE 50 MG/1
50 TABLET, FILM COATED ORAL NIGHTLY
Qty: 90 TABLET | Refills: 0 | Status: SHIPPED | OUTPATIENT
Start: 2025-08-25

## 2025-08-25 RX ORDER — BUPROPION HYDROCHLORIDE 150 MG/1
150 TABLET ORAL EVERY MORNING
Qty: 90 TABLET | Refills: 0 | Status: SHIPPED | OUTPATIENT
Start: 2025-08-25

## 2025-08-25 RX ORDER — TOPIRAMATE 100 MG/1
100 TABLET, FILM COATED ORAL 2 TIMES DAILY
Qty: 180 TABLET | Refills: 0 | Status: SHIPPED | OUTPATIENT
Start: 2025-08-25

## 2025-08-25 RX ORDER — DEXTROAMPHETAMINE SACCHARATE, AMPHETAMINE ASPARTATE MONOHYDRATE, DEXTROAMPHETAMINE SULFATE AND AMPHETAMINE SULFATE 5; 5; 5; 5 MG/1; MG/1; MG/1; MG/1
20 CAPSULE, EXTENDED RELEASE ORAL DAILY
Qty: 30 CAPSULE | Refills: 0 | Status: SHIPPED | OUTPATIENT
Start: 2025-08-25

## 2025-08-25 RX ORDER — LAMOTRIGINE 200 MG/1
200 TABLET ORAL 2 TIMES DAILY
Qty: 180 TABLET | Refills: 0 | Status: SHIPPED | OUTPATIENT
Start: 2025-08-25

## 2025-08-25 RX ORDER — CITALOPRAM HYDROBROMIDE 10 MG/1
10 TABLET ORAL DAILY
Qty: 90 TABLET | Refills: 0 | Status: SHIPPED | OUTPATIENT
Start: 2025-08-25

## 2025-08-25 RX ORDER — CITALOPRAM HYDROBROMIDE 10 MG/1
10 TABLET ORAL DAILY
Qty: 90 TABLET | Refills: 3 | OUTPATIENT
Start: 2025-08-25

## 2025-08-25 RX ORDER — BUSPIRONE HYDROCHLORIDE 10 MG/1
10 TABLET ORAL 3 TIMES DAILY
Qty: 270 TABLET | Refills: 0 | Status: SHIPPED | OUTPATIENT
Start: 2025-08-25